# Patient Record
Sex: FEMALE | Race: WHITE | NOT HISPANIC OR LATINO | Employment: OTHER | URBAN - METROPOLITAN AREA
[De-identification: names, ages, dates, MRNs, and addresses within clinical notes are randomized per-mention and may not be internally consistent; named-entity substitution may affect disease eponyms.]

---

## 2017-01-09 ENCOUNTER — APPOINTMENT (OUTPATIENT)
Dept: PHYSICAL THERAPY | Facility: CLINIC | Age: 82
End: 2017-01-09
Payer: MEDICARE

## 2017-01-09 PROCEDURE — G8985 CARRY GOAL STATUS: HCPCS

## 2017-01-09 PROCEDURE — 97161 PT EVAL LOW COMPLEX 20 MIN: CPT

## 2017-01-09 PROCEDURE — 97110 THERAPEUTIC EXERCISES: CPT

## 2017-01-09 PROCEDURE — G8984 CARRY CURRENT STATUS: HCPCS

## 2017-01-11 ENCOUNTER — APPOINTMENT (OUTPATIENT)
Dept: PHYSICAL THERAPY | Facility: CLINIC | Age: 82
End: 2017-01-11
Payer: MEDICARE

## 2017-01-11 PROCEDURE — 97110 THERAPEUTIC EXERCISES: CPT

## 2017-01-18 ENCOUNTER — APPOINTMENT (OUTPATIENT)
Dept: PHYSICAL THERAPY | Facility: CLINIC | Age: 82
End: 2017-01-18
Payer: MEDICARE

## 2017-01-18 PROCEDURE — 97112 NEUROMUSCULAR REEDUCATION: CPT

## 2017-01-18 PROCEDURE — 97110 THERAPEUTIC EXERCISES: CPT

## 2017-01-20 ENCOUNTER — APPOINTMENT (OUTPATIENT)
Dept: PHYSICAL THERAPY | Facility: CLINIC | Age: 82
End: 2017-01-20
Payer: MEDICARE

## 2017-01-20 PROCEDURE — 97110 THERAPEUTIC EXERCISES: CPT

## 2017-01-23 ENCOUNTER — APPOINTMENT (OUTPATIENT)
Dept: PHYSICAL THERAPY | Facility: CLINIC | Age: 82
End: 2017-01-23
Payer: MEDICARE

## 2017-01-23 PROCEDURE — 97110 THERAPEUTIC EXERCISES: CPT

## 2017-01-25 ENCOUNTER — APPOINTMENT (OUTPATIENT)
Dept: PHYSICAL THERAPY | Facility: CLINIC | Age: 82
End: 2017-01-25
Payer: MEDICARE

## 2017-01-25 PROCEDURE — 97110 THERAPEUTIC EXERCISES: CPT

## 2017-01-30 ENCOUNTER — APPOINTMENT (OUTPATIENT)
Dept: PHYSICAL THERAPY | Facility: CLINIC | Age: 82
End: 2017-01-30
Payer: MEDICARE

## 2017-01-30 PROCEDURE — 97110 THERAPEUTIC EXERCISES: CPT

## 2017-02-01 ENCOUNTER — APPOINTMENT (OUTPATIENT)
Dept: PHYSICAL THERAPY | Facility: CLINIC | Age: 82
End: 2017-02-01
Payer: MEDICARE

## 2017-02-06 ENCOUNTER — APPOINTMENT (OUTPATIENT)
Dept: PHYSICAL THERAPY | Facility: CLINIC | Age: 82
End: 2017-02-06
Payer: MEDICARE

## 2017-02-06 PROCEDURE — 97110 THERAPEUTIC EXERCISES: CPT

## 2017-02-08 ENCOUNTER — APPOINTMENT (OUTPATIENT)
Dept: PHYSICAL THERAPY | Facility: CLINIC | Age: 82
End: 2017-02-08
Payer: MEDICARE

## 2017-02-08 PROCEDURE — 97110 THERAPEUTIC EXERCISES: CPT

## 2017-02-13 ENCOUNTER — APPOINTMENT (OUTPATIENT)
Dept: PHYSICAL THERAPY | Facility: CLINIC | Age: 82
End: 2017-02-13
Payer: MEDICARE

## 2017-02-13 PROCEDURE — G8986 CARRY D/C STATUS: HCPCS

## 2017-02-13 PROCEDURE — G8985 CARRY GOAL STATUS: HCPCS

## 2017-02-13 PROCEDURE — 97110 THERAPEUTIC EXERCISES: CPT

## 2017-02-21 DIAGNOSIS — I10 ESSENTIAL (PRIMARY) HYPERTENSION: ICD-10-CM

## 2017-02-22 ENCOUNTER — APPOINTMENT (OUTPATIENT)
Dept: LAB | Facility: CLINIC | Age: 82
End: 2017-02-22
Payer: MEDICARE

## 2017-02-22 ENCOUNTER — TRANSCRIBE ORDERS (OUTPATIENT)
Dept: LAB | Facility: CLINIC | Age: 82
End: 2017-02-22

## 2017-02-22 DIAGNOSIS — I10 ESSENTIAL (PRIMARY) HYPERTENSION: ICD-10-CM

## 2017-02-22 LAB
ALBUMIN SERPL BCP-MCNC: 3.5 G/DL (ref 3.5–5)
ALP SERPL-CCNC: 48 U/L (ref 46–116)
ALT SERPL W P-5'-P-CCNC: 28 U/L (ref 12–78)
ANION GAP SERPL CALCULATED.3IONS-SCNC: 8 MMOL/L (ref 4–13)
AST SERPL W P-5'-P-CCNC: 23 U/L (ref 5–45)
BILIRUB SERPL-MCNC: 0.36 MG/DL (ref 0.2–1)
BUN SERPL-MCNC: 23 MG/DL (ref 5–25)
CALCIUM SERPL-MCNC: 10 MG/DL (ref 8.3–10.1)
CHLORIDE SERPL-SCNC: 104 MMOL/L (ref 100–108)
CO2 SERPL-SCNC: 28 MMOL/L (ref 21–32)
CREAT SERPL-MCNC: 1.12 MG/DL (ref 0.6–1.3)
GFR SERPL CREATININE-BSD FRML MDRD: 46.2 ML/MIN/1.73SQ M
GLUCOSE SERPL-MCNC: 97 MG/DL (ref 65–140)
POTASSIUM SERPL-SCNC: 3.9 MMOL/L (ref 3.5–5.3)
PROT SERPL-MCNC: 7.2 G/DL (ref 6.4–8.2)
SODIUM SERPL-SCNC: 140 MMOL/L (ref 136–145)
TSH SERPL DL<=0.05 MIU/L-ACNC: 4.1 UIU/ML (ref 0.36–3.74)

## 2017-02-22 PROCEDURE — 80053 COMPREHEN METABOLIC PANEL: CPT

## 2017-02-22 PROCEDURE — 36415 COLL VENOUS BLD VENIPUNCTURE: CPT

## 2017-02-22 PROCEDURE — 84443 ASSAY THYROID STIM HORMONE: CPT

## 2017-02-23 ENCOUNTER — GENERIC CONVERSION - ENCOUNTER (OUTPATIENT)
Dept: OTHER | Facility: OTHER | Age: 82
End: 2017-02-23

## 2017-05-23 ENCOUNTER — ALLSCRIPTS OFFICE VISIT (OUTPATIENT)
Dept: OTHER | Facility: OTHER | Age: 82
End: 2017-05-23

## 2017-05-23 DIAGNOSIS — E78.2 MIXED HYPERLIPIDEMIA: ICD-10-CM

## 2017-05-23 DIAGNOSIS — I10 ESSENTIAL (PRIMARY) HYPERTENSION: ICD-10-CM

## 2017-05-23 DIAGNOSIS — N18.2 CHRONIC KIDNEY DISEASE, STAGE II (MILD): ICD-10-CM

## 2017-05-23 LAB
BILIRUB UR QL STRIP: NORMAL
CLARITY UR: NORMAL
COLOR UR: YELLOW
GLUCOSE (HISTORICAL): 50
HGB UR QL STRIP.AUTO: NORMAL
KETONES UR STRIP-MCNC: NORMAL MG/DL
LEUKOCYTE ESTERASE UR QL STRIP: NORMAL
NITRITE UR QL STRIP: NORMAL
PH UR STRIP.AUTO: 5 [PH]
PROT UR STRIP-MCNC: 30 MG/DL
SP GR UR STRIP.AUTO: 1.02
UROBILINOGEN UR QL STRIP.AUTO: NORMAL

## 2017-05-26 ENCOUNTER — APPOINTMENT (OUTPATIENT)
Dept: LAB | Facility: CLINIC | Age: 82
End: 2017-05-26
Payer: MEDICARE

## 2017-05-26 ENCOUNTER — TRANSCRIBE ORDERS (OUTPATIENT)
Dept: LAB | Facility: CLINIC | Age: 82
End: 2017-05-26

## 2017-05-26 DIAGNOSIS — I10 ESSENTIAL (PRIMARY) HYPERTENSION: ICD-10-CM

## 2017-05-26 DIAGNOSIS — N18.2 CHRONIC KIDNEY DISEASE, STAGE II (MILD): ICD-10-CM

## 2017-05-26 DIAGNOSIS — E78.2 MIXED HYPERLIPIDEMIA: ICD-10-CM

## 2017-05-26 LAB
ALBUMIN SERPL BCP-MCNC: 3.6 G/DL (ref 3.5–5)
ALP SERPL-CCNC: 56 U/L (ref 46–116)
ALT SERPL W P-5'-P-CCNC: 29 U/L (ref 12–78)
ANION GAP SERPL CALCULATED.3IONS-SCNC: 6 MMOL/L (ref 4–13)
AST SERPL W P-5'-P-CCNC: 24 U/L (ref 5–45)
BILIRUB SERPL-MCNC: 0.41 MG/DL (ref 0.2–1)
BUN SERPL-MCNC: 25 MG/DL (ref 5–25)
CALCIUM SERPL-MCNC: 9.7 MG/DL (ref 8.3–10.1)
CHLORIDE SERPL-SCNC: 103 MMOL/L (ref 100–108)
CHOLEST SERPL-MCNC: 228 MG/DL (ref 50–200)
CO2 SERPL-SCNC: 28 MMOL/L (ref 21–32)
CREAT SERPL-MCNC: 1.24 MG/DL (ref 0.6–1.3)
ERYTHROCYTE [DISTWIDTH] IN BLOOD BY AUTOMATED COUNT: 14.8 % (ref 11.6–15.1)
GFR SERPL CREATININE-BSD FRML MDRD: 41.1 ML/MIN/1.73SQ M
GLUCOSE P FAST SERPL-MCNC: 104 MG/DL (ref 65–99)
HCT VFR BLD AUTO: 37.6 % (ref 34.8–46.1)
HDLC SERPL-MCNC: 49 MG/DL (ref 40–60)
HGB BLD-MCNC: 12.1 G/DL (ref 11.5–15.4)
LDLC SERPL CALC-MCNC: 137 MG/DL (ref 0–100)
MCH RBC QN AUTO: 28.6 PG (ref 26.8–34.3)
MCHC RBC AUTO-ENTMCNC: 32.2 G/DL (ref 31.4–37.4)
MCV RBC AUTO: 89 FL (ref 82–98)
PLATELET # BLD AUTO: 462 THOUSANDS/UL (ref 149–390)
PMV BLD AUTO: 10.5 FL (ref 8.9–12.7)
POTASSIUM SERPL-SCNC: 4 MMOL/L (ref 3.5–5.3)
PROT SERPL-MCNC: 7.1 G/DL (ref 6.4–8.2)
RBC # BLD AUTO: 4.23 MILLION/UL (ref 3.81–5.12)
SODIUM SERPL-SCNC: 137 MMOL/L (ref 136–145)
T4 FREE SERPL-MCNC: 1.08 NG/DL (ref 0.76–1.46)
TRIGL SERPL-MCNC: 210 MG/DL
TSH SERPL DL<=0.05 MIU/L-ACNC: 6.33 UIU/ML (ref 0.36–3.74)
WBC # BLD AUTO: 7.45 THOUSAND/UL (ref 4.31–10.16)

## 2017-05-26 PROCEDURE — 84443 ASSAY THYROID STIM HORMONE: CPT

## 2017-05-26 PROCEDURE — 85027 COMPLETE CBC AUTOMATED: CPT

## 2017-05-26 PROCEDURE — 80053 COMPREHEN METABOLIC PANEL: CPT

## 2017-05-26 PROCEDURE — 36415 COLL VENOUS BLD VENIPUNCTURE: CPT

## 2017-05-26 PROCEDURE — 80061 LIPID PANEL: CPT

## 2017-05-26 PROCEDURE — 84439 ASSAY OF FREE THYROXINE: CPT

## 2017-05-29 ENCOUNTER — GENERIC CONVERSION - ENCOUNTER (OUTPATIENT)
Dept: OTHER | Facility: OTHER | Age: 82
End: 2017-05-29

## 2017-06-05 ENCOUNTER — ALLSCRIPTS OFFICE VISIT (OUTPATIENT)
Dept: OTHER | Facility: OTHER | Age: 82
End: 2017-06-05

## 2017-08-08 DIAGNOSIS — Z12.31 ENCOUNTER FOR SCREENING MAMMOGRAM FOR MALIGNANT NEOPLASM OF BREAST: ICD-10-CM

## 2017-09-26 ENCOUNTER — ALLSCRIPTS OFFICE VISIT (OUTPATIENT)
Dept: OTHER | Facility: OTHER | Age: 82
End: 2017-09-26

## 2017-09-26 DIAGNOSIS — Z12.31 ENCOUNTER FOR SCREENING MAMMOGRAM FOR MALIGNANT NEOPLASM OF BREAST: ICD-10-CM

## 2017-09-28 ENCOUNTER — ALLSCRIPTS OFFICE VISIT (OUTPATIENT)
Dept: OTHER | Facility: OTHER | Age: 82
End: 2017-09-28

## 2017-10-24 RX ORDER — VALSARTAN AND HYDROCHLOROTHIAZIDE 320; 25 MG/1; MG/1
1 TABLET, FILM COATED ORAL EVERY MORNING
COMMUNITY
End: 2018-09-14 | Stop reason: RX

## 2017-10-24 RX ORDER — METOPROLOL SUCCINATE 100 MG/1
100 TABLET, EXTENDED RELEASE ORAL
COMMUNITY
End: 2018-02-17 | Stop reason: SDUPTHER

## 2017-10-24 RX ORDER — AMLODIPINE BESYLATE 5 MG/1
5 TABLET ORAL EVERY EVENING
COMMUNITY
End: 2018-06-04 | Stop reason: SDUPTHER

## 2017-10-24 RX ORDER — FENOFIBRATE 145 MG/1
145 TABLET, COATED ORAL EVERY EVENING
COMMUNITY
End: 2018-05-11 | Stop reason: SDUPTHER

## 2017-10-24 RX ORDER — CYCLOSPORINE 0.5 MG/ML
1 EMULSION OPHTHALMIC 2 TIMES DAILY
COMMUNITY

## 2017-10-24 NOTE — PRE-PROCEDURE INSTRUCTIONS
Pre-Surgery Instructions:   Medication Instructions    amLODIPine (NORVASC) 5 mg tablet Patient was instructed by Physician and understands   cycloSPORINE (RESTASIS) 0 05 % ophthalmic emulsion Patient was instructed by Physician and understands   fenofibrate (TRICOR) 145 mg tablet Patient was instructed by Physician and understands   metoprolol succinate (TOPROL-XL) 100 mg 24 hr tablet Patient was instructed by Physician and understands   mometasone (ASMANEX 120 METERED DOSES) 220 MCG/INH inhaler Patient was instructed by Physician and understands   valsartan-hydrochlorothiazide (DIOVAN-HCT) 320-25 MG per tablet Patient was instructed by Physician and understands

## 2017-10-25 ENCOUNTER — HOSPITAL ENCOUNTER (OUTPATIENT)
Facility: AMBULARY SURGERY CENTER | Age: 82
Setting detail: OUTPATIENT SURGERY
Discharge: HOME | End: 2017-10-25
Attending: INTERNAL MEDICINE | Admitting: INTERNAL MEDICINE
Payer: MEDICARE

## 2017-10-25 ENCOUNTER — ANESTHESIA (OUTPATIENT)
Dept: GASTROENTEROLOGY | Facility: AMBULARY SURGERY CENTER | Age: 82
End: 2017-10-25
Payer: MEDICARE

## 2017-10-25 ENCOUNTER — GENERIC CONVERSION - ENCOUNTER (OUTPATIENT)
Dept: OTHER | Facility: OTHER | Age: 82
End: 2017-10-25

## 2017-10-25 VITALS
RESPIRATION RATE: 18 BRPM | TEMPERATURE: 97.8 F | OXYGEN SATURATION: 94 % | BODY MASS INDEX: 26.21 KG/M2 | HEIGHT: 59 IN | HEART RATE: 59 BPM | DIASTOLIC BLOOD PRESSURE: 92 MMHG | WEIGHT: 130 LBS | SYSTOLIC BLOOD PRESSURE: 147 MMHG

## 2017-10-25 DIAGNOSIS — K92.1 MELENA: ICD-10-CM

## 2017-10-25 PROCEDURE — 88305 TISSUE EXAM BY PATHOLOGIST: CPT | Performed by: INTERNAL MEDICINE

## 2017-10-25 RX ORDER — GLYCOPYRROLATE 0.2 MG/ML
INJECTION INTRAMUSCULAR; INTRAVENOUS AS NEEDED
Status: DISCONTINUED | OUTPATIENT
Start: 2017-10-25 | End: 2017-10-25 | Stop reason: SURG

## 2017-10-25 RX ORDER — SODIUM CHLORIDE, SODIUM LACTATE, POTASSIUM CHLORIDE, CALCIUM CHLORIDE 600; 310; 30; 20 MG/100ML; MG/100ML; MG/100ML; MG/100ML
INJECTION, SOLUTION INTRAVENOUS CONTINUOUS PRN
Status: DISCONTINUED | OUTPATIENT
Start: 2017-10-25 | End: 2017-10-25 | Stop reason: SURG

## 2017-10-25 RX ORDER — SODIUM CHLORIDE, SODIUM LACTATE, POTASSIUM CHLORIDE, CALCIUM CHLORIDE 600; 310; 30; 20 MG/100ML; MG/100ML; MG/100ML; MG/100ML
100 INJECTION, SOLUTION INTRAVENOUS CONTINUOUS
Status: CANCELLED | OUTPATIENT
Start: 2017-10-25

## 2017-10-25 RX ORDER — PROPOFOL 10 MG/ML
INJECTION, EMULSION INTRAVENOUS AS NEEDED
Status: DISCONTINUED | OUTPATIENT
Start: 2017-10-25 | End: 2017-10-25 | Stop reason: SURG

## 2017-10-25 RX ORDER — LIDOCAINE HYDROCHLORIDE 10 MG/ML
INJECTION, SOLUTION EPIDURAL; INFILTRATION; INTRACAUDAL; PERINEURAL AS NEEDED
Status: DISCONTINUED | OUTPATIENT
Start: 2017-10-25 | End: 2017-10-25 | Stop reason: SURG

## 2017-10-25 RX ADMIN — PROPOFOL 50 MG: 10 INJECTION, EMULSION INTRAVENOUS at 11:06

## 2017-10-25 RX ADMIN — PROPOFOL 10 MG: 10 INJECTION, EMULSION INTRAVENOUS at 11:26

## 2017-10-25 RX ADMIN — PROPOFOL 20 MG: 10 INJECTION, EMULSION INTRAVENOUS at 11:16

## 2017-10-25 RX ADMIN — PROPOFOL 10 MG: 10 INJECTION, EMULSION INTRAVENOUS at 11:14

## 2017-10-25 RX ADMIN — PROPOFOL 10 MG: 10 INJECTION, EMULSION INTRAVENOUS at 11:30

## 2017-10-25 RX ADMIN — PROPOFOL 50 MG: 10 INJECTION, EMULSION INTRAVENOUS at 11:05

## 2017-10-25 RX ADMIN — PROPOFOL 10 MG: 10 INJECTION, EMULSION INTRAVENOUS at 11:24

## 2017-10-25 RX ADMIN — PROPOFOL 10 MG: 10 INJECTION, EMULSION INTRAVENOUS at 11:18

## 2017-10-25 RX ADMIN — GLYCOPYRROLATE 0.2 MG: 0.2 INJECTION, SOLUTION INTRAMUSCULAR; INTRAVENOUS at 11:20

## 2017-10-25 RX ADMIN — GLYCOPYRROLATE 0.2 MG: 0.2 INJECTION, SOLUTION INTRAMUSCULAR; INTRAVENOUS at 11:14

## 2017-10-25 RX ADMIN — PROPOFOL 10 MG: 10 INJECTION, EMULSION INTRAVENOUS at 11:10

## 2017-10-25 RX ADMIN — PROPOFOL 10 MG: 10 INJECTION, EMULSION INTRAVENOUS at 11:28

## 2017-10-25 RX ADMIN — SODIUM CHLORIDE, SODIUM LACTATE, POTASSIUM CHLORIDE, AND CALCIUM CHLORIDE: .6; .31; .03; .02 INJECTION, SOLUTION INTRAVENOUS at 07:20

## 2017-10-25 RX ADMIN — SODIUM CHLORIDE, SODIUM LACTATE, POTASSIUM CHLORIDE, AND CALCIUM CHLORIDE 1000 ML: .6; .31; .03; .02 INJECTION, SOLUTION INTRAVENOUS at 10:50

## 2017-10-25 RX ADMIN — PROPOFOL 10 MG: 10 INJECTION, EMULSION INTRAVENOUS at 11:22

## 2017-10-25 RX ADMIN — PROPOFOL 10 MG: 10 INJECTION, EMULSION INTRAVENOUS at 11:07

## 2017-10-25 RX ADMIN — LIDOCAINE HYDROCHLORIDE 50 MG: 10 INJECTION, SOLUTION EPIDURAL; INFILTRATION; INTRACAUDAL; PERINEURAL at 11:08

## 2017-10-25 RX ADMIN — PROPOFOL 10 MG: 10 INJECTION, EMULSION INTRAVENOUS at 11:20

## 2017-10-25 NOTE — ANESTHESIA PREPROCEDURE EVALUATION
Review of Systems/Medical History  Patient summary reviewed  Chart reviewed      Cardiovascular  Hyperlipidemia, Hypertension ,    Pulmonary  Asthma: , ,        GI/Hepatic            Endo/Other     GYN       Hematology   Musculoskeletal       Neurology   Psychology           Physical Exam    Airway    Mallampati score: II  TM Distance: >3 FB  Neck ROM: full     Dental       Cardiovascular  Rhythm: regular, Rate: normal,     Pulmonary      Other Findings        Anesthesia Plan  ASA Score- 2       Anesthesia Type- IV sedation with anesthesia with ASA Monitors  Additional Monitors:   Airway Plan:           Induction- intravenous  Informed Consent- Anesthetic plan and risks discussed with patient

## 2017-10-25 NOTE — H&P
History and Physical -  [facility]  Donavan Parker 80 y o  female MRN: [id number]    HPI: Donavan Parker is a 80y o  year old female who presents with change in bowel movements and incomplete evacuation  Review of Systems    Historical Information   Past Medical History:   Diagnosis Date    Anesthesia     groggy, difficult to awaken    Asthma     Chronic kidney disease     stage 2 mild    Chronic pain disorder     lumbar herniated discs    Dry eyes     Hyperlipidemia     Hypertension      Past Surgical History:   Procedure Laterality Date    APPENDECTOMY      CATARACT EXTRACTION Bilateral     JOINT REPLACEMENT Bilateral     partial knee     Social History   History   Alcohol Use No     History   Drug Use No     History   Smoking Status    Never Smoker   Smokeless Tobacco    Never Used     Family History   Problem Relation Age of Onset    Heart disease Father     Aneurysm Sister     Heart disease Brother     Cancer Daughter      kidney     Heart disease Brother     Heart disease Brother     Heart disease Sister      MI    Kidney disease Sister     Heart disease Daughter        Meds/Allergies     Prescriptions Prior to Admission   Medication    amLODIPine (NORVASC) 5 mg tablet    cycloSPORINE (RESTASIS) 0 05 % ophthalmic emulsion    fenofibrate (TRICOR) 145 mg tablet    metoprolol succinate (TOPROL-XL) 100 mg 24 hr tablet    mometasone (ASMANEX 120 METERED DOSES) 220 MCG/INH inhaler    valsartan-hydrochlorothiazide (DIOVAN-HCT) 320-25 MG per tablet       Allergies   Allergen Reactions    Penicillins Rash       Objective     Height 4' 11" (1 499 m), weight 59 kg (130 lb)  PHYSICAL EXAM    Gen: NAD  CV: RRR  CHEST: Clear  ABD: soft, NT/ND  EXT: no edema  Neuro: AAO      ASSESSMENT/PLAN:  This is a 80y o  year old female here for change in bowel movements and incomplete evacuation       PLAN:   Procedure: colonoscopy

## 2017-10-25 NOTE — OP NOTE
COLONOSCOPY    PROCEDURE: Colonoscopy/ Biopsy    INDICATIONS: Rectal Pain, change in bowel movements, thin caliber stools     POST-OP DIAGNOSIS: See the impression below    SEDATION: Monitored anesthesia care, check anesthesia records    PHYSICAL EXAM:    BP (!) 172/76   Pulse 56   Temp 97 8 °F (36 6 °C) (Tympanic)   Resp 18   Ht 4' 11" (1 499 m)   Wt 59 kg (130 lb)   SpO2 97%   BMI 26 26 kg/m²   Body mass index is 26 26 kg/m²  General: NAD  Heart: S1 & S2 normal, RRR  Lungs: CTA, No rales or rhonchi  Abdomen: Soft, nontender, nondistended, good bowel sounds    CONSENT:  Informed consent was obtained for the procedure, including sedation after explaining the risks and benefits of the procedure  Risks including but not limited to bleeding, perforation, infection, aspiration were discussed in detail  Also explained about less than 100%$ sensitivity with the exam and other alternatives  PREPARATION:   EKG tracing, pulse oximetry, blood pressure were monitored throughout the procedure  Patient was identified by myself both verbally and by visual inspection of ID band  DESCRIPTION:   Patient was placed in the left lateral decubitus position and was sedated with the above medication  Digital rectal examination was performed  The colonoscope was introduced in to the anal canal and advanced up to cecum, which was identified by the appendiceal orifice and IC valve  A careful inspection was made as the colonoscope was withdrawn, including a retroflexed view of the rectum; findings and interventions are described below  Appropriate photodocumentation was obtained  The quality of the colonic preparation was good  FINDINGS:    Mild sigmoid diverticulosis, very tortuous colon   Patient developed bradycardia, scope was withdraw and given glycopyrlate  Otherwise normal colon, random biopsies taken  Mild internal hemorrhoids         IMPRESSIONS:      Sigmoid diverticulosis and mild internal hemorrhoids  Random biopsies taken  Bradycardia during colonoscopy, required pharmacologic correction    RECOMMENDATIONS:    Discharge home  Resume regular diet  Resume home meds  Follow up biopsy results, call the office in 3 months  No indication for repeat colonoscopy  Take suppositories as prescribed  Call with any abdominal pain, bleeding, fevers    COMPLICATIONS:  None; patient tolerated the procedure well      DISPOSITION: PACU           CONDITION: Stable

## 2017-10-25 NOTE — DISCHARGE INSTRUCTIONS
Discharge home  Resume regular diet  Resume home meds  Follow up biopsy results, call the office in 3 months  No indication for repeat colonoscopy  Take suppositories as prescribed  Call with any abdominal pain, bleeding, fevers

## 2017-10-27 NOTE — CONSULTS
Assessment  1  Change in bowel habits (787 99) (R19 4)   2  Blood in the stool (578 1) (K92 1)    Plan  Blood in the stool    · Anusol-HC 25 MG Rectal Suppository; INSERT 1 SUPPOSITORY RECTALLY AT  BEDTIME AS NEEDED   Rx By: Susy Merlin; Dispense: 15 Days ; #:15 Suppository; Refill: 3;For: Blood in the stool; CHUCK = N; Verified Transmission to [facility]; Last Updated By: System, SureScript[employer]; 9/28/2017 9:42:45 AM   · Suprep Bowel Prep Kit 17 5-3 13-1 6 GM/180ML Oral Solution; USE AS DIRECTED   Rx By: Susy Merlin; Dispense: 0 Days ; #:1 X 177 ML Bottle (2 Bottles); Refill: 0;For: Blood in the stool; CHUCK = N; Verified Transmission to [facility]; Last Updated By: System, SureScript[employer]; 9/28/2017 9:42:44 AM   · COLONOSCOPY (GI, SURG); Status:Hold For - Scheduling; Requested :[id number];    Perform:[facility]; ENX:[id number]; Ordered; For:Blood in the stool; Ordered By:Austin Finley;    Discussion/Summary  Discussion Summary:   Very pleasant 15-year-old female with a history of herniated back disks, now with thin caliber stools and incomplete evacuation with rare amounts of blood in stool  change in bowel movements, blood in stool, incomplete evacuation: This could be due to colitis, hemorrhoids, more significant pathology such as malignancies on the differentialrecommended that she take a daily fiber supplementgive her suppository prescription to use for 3 nights to see if this helps somewill proceed with colonoscopy to exclude luminal pathology as discussed above, discussed with her the risks of the procedures including bleeding, surgery, perforation, missed polyp detection rate        Chief Complaint  Chief Complaint Free Text Note Form: Evaluation for change in bowel movements      History of Present Illness  HPI: As you know this is a very pleasant 15-year-old female with a history of hypertension, hyperlipidemia, well-controlled asthma, she reports a vague history of colitis in the past, last colonoscopy was within 20 years ago she notes that over the last one year she's had a change in bowel movements  She had some herniated discs and she notes when that first started, L4-L5 started to have change in bowel movements  Typically she reports rectal pressure, sensation of incomplete evacuation and what she describes as flat, thin stools  This had subsided for a while but has recurred again recently  She only takes Aleve for her back pain  She's also undergoing physical therapy  She notes that if she has a loose stool after several loose bowel movements she noticed streaks of blood with that  She has a loose stools about once per month  This is typically diet  denies any nausea, vomiting, abdominal pain, melena, reflux symptoms or dysphagia  She has gained some weight recently  Family history is negative for GI associated malignancies  Her daughter did have kidney cancer  patient is a retired  and she judges dogs shows  Review of Systems  Complete-Female GI Adult: Other Symptoms: The remainder of the ten ROS was negative  Active Problems  1  Asthma (493 90) (J45 909)   2  Benign essential hypertension (401 1) (I10)   3  Change in bowel habits (787 99) (R19 4)   4  Chronic kidney disease, stage II (mild) (585 2) (N18 2)   5  Encounter for screening mammogram for malignant neoplasm of breast (V76 12)   (Z12 31)   6  Lumbar radiculopathy (724 4) (M54 16)   7  Mixed hyperlipidemia (272 2) (E78 2)   8  Need for vaccination with 13-polyvalent pneumococcal conjugate vaccine (V03 82) (Z23)   9  Osteoporosis (733 00) (M81 0)   10  Rectal pain (569 42) (K62 89)    Past Medical History  1  History of Acute pansinusitis (461 8) (J01 40)   2  History of Acute right-sided back pain, unspecified back location (724 5) (M54 9)   3  History of Acute upper respiratory infection (465 9) (J06 9)   4  History of Change in bowel habits (787 99) (R19 4)   5  History of Cough (786 2) (R05)   6   History of backache (V13 59) (Z87 39)   7  History of chronic pharyngitis (V12 69) (Z87 09)   8  History of contact dermatitis (V13 3) (Z87 2)   9  History of herpes zoster (V12 09) (Z86 19)   10  History of hypercholesterolemia (V12 29) (Z86 39)   11  History of Impacted cerumen, unspecified laterality (380 4) (H61 20)   12  History of Left sided sciatica (724 3) (M54 32)   13  History of Limb pain (729 5) (M79 609)   14  History of Limb pain (729 5) (M79 609)   15  History of Lumbar radiculopathy (724 4) (M54 16)   16  History of Multiple joint pain (719 49) (M25 50)   17  History of Multiple Nonvenomous Insect Bites (919 4)   18  History of Nonallopathic lesion of lumbar region (739 3) (M99 9)   19  History of Osteoarthritis of knee (715 36) (M17 10)   20  History of Pain in wrist, unspecified laterality (719 43) (M25 539)   21  History of Rash and other nonspecific skin eruption (782 1) (R21)   22  History of Screening for malignant neoplasm of cervix (V76 2) (Z12 4)   23  History of Symptoms Referable To A Joint Of The Shoulder Region (719 61)   24  History of Vulvovaginitis (616 10) (N76 0)  Active Problems And Past Medical History Reviewed: The active problems and past medical history were reviewed and updated today  Surgical History  1  History of Knee Arthroplasty  Surgical History Reviewed: The surgical history was reviewed and updated today  Family History  Mother    1  Denied: Family history of colon cancer   2  Denied: Family history of colonic polyps   3  Denied: Family history of liver disease  Father    4  Denied: Family history of colon cancer   5  Denied: Family history of colonic polyps   6  Denied: Family history of liver disease  Family History Reviewed: The family history was reviewed and updated today  Social History   · Never A Smoker  Social History Reviewed: The social history was reviewed and updated today  Current Meds   1   AmLODIPine Besylate 5 MG Oral Tablet; TAKE ONE TO TWO TABLETS BY MOUTH ONCE   DAILY; Therapy: 70OXN5909 to (Camelia Holliday)  Requested for: 26NGA2331; Last   Rx:74Ewd2000 Ordered   2  Asmanex 120 Metered Doses 220 MCG/INH Inhalation Aerosol Powder Breath Activated;   INHALE TWO PUFFS BY MOUTH ONCE DAILY; Therapy: 88OTY5817 to ([id number])  Requested for: 43RZI5620; Last   Rx:62Owt8332 Ordered   3  Fenofibrate 145 MG Oral Tablet; TAKE ONE TABLET BY MOUTH ONCE DAILY WITH   FOOD; Therapy: 78YZB0322 to (Evaluate:41Bij0207)  Requested for: 22Ngg8765; Last   Rx:43Wdg3742 Ordered   4  Metoprolol Succinate  MG Oral Tablet Extended Release 24 Hour; TAKE ONE   TABLET BY MOUTH AT BEDTIME; Therapy: 73KCM1512 to (Evaluate:13Lbh0216)  Requested for: 54OHQ7504; Last   Rx:64Evc8584 Ordered   5  ProAir  (90 Base) MCG/ACT Inhalation Aerosol Solution; 2 puffs INH every 4 h as   needed wheezing; Therapy: 97FKR7926 to (Last BU:36TYM8118)  Requested for: 09CVH7818 Ordered   6  Restasis 0 05 % Ophthalmic Emulsion; Therapy: 94GMZ3252 to  Requested for: 96FVW8996 Recorded   7  Valsartan-Hydrochlorothiazide 320-25 MG Oral Tablet; TAKE ONE TABLET BY MOUTH   ONCE DAILY; Therapy: 10GPL5733 to (Evaluate:15Jnh5962)  Requested for: 81FSY7729; Last   Rx:71Meg9334 Ordered  Medication List Reviewed: The medication list was reviewed and updated today  Allergies  1  Avelox TABS   2  Codeine   3  Lipitor TABS   4  Penicillins   5  Zithromax TABS    Vitals  Vital Signs    Recorded: 20KDF9118 09:27AM   Temperature 97 6 F   Heart Rate 63   Respiration 16   Systolic 010   Diastolic 90   Height 4 ft 11 in   Weight 133 lb 4 oz   BMI Calculated 26 91   BSA Calculated 1 55   O2 Saturation 94     Physical Exam  Gen  : Well-nourished well-developed, no acute distress  HEENT: Anicteric, moist mucous membranes, no cervical or supraclavicular lymphadenopathy  Cardiovascular: Regular rate and rhythm, no murmurs rubs or gallops  Pulmonary: Clear to auscultation bilaterally  Abdomen: Soft, nontender, nondistended  No hepatosplenomegaly  Bowel sounds present and regular    Extremities: No cyanosis, clubbing, or edema,  Skin: No rashes  Neuro: Alert, awake, oriented x3  Rectal exam was deferred today         Signatures   Electronically signed by : GEOVANI Villanueva ; Sep 28 2017  9:44AM EST                       (Author)

## 2017-10-29 ENCOUNTER — GENERIC CONVERSION - ENCOUNTER (OUTPATIENT)
Dept: OTHER | Facility: OTHER | Age: 82
End: 2017-10-29

## 2017-11-09 DIAGNOSIS — Z12.31 ENCOUNTER FOR SCREENING MAMMOGRAM FOR MALIGNANT NEOPLASM OF BREAST: ICD-10-CM

## 2017-11-15 ENCOUNTER — HOSPITAL ENCOUNTER (OUTPATIENT)
Dept: RADIOLOGY | Facility: CLINIC | Age: 82
Discharge: HOME | End: 2017-11-15
Payer: MEDICARE

## 2017-11-15 DIAGNOSIS — Z12.31 ENCOUNTER FOR SCREENING MAMMOGRAM FOR MALIGNANT NEOPLASM OF BREAST: ICD-10-CM

## 2017-11-15 PROCEDURE — G0202 SCR MAMMO BI INCL CAD: HCPCS

## 2018-01-09 NOTE — RESULT NOTES
Verified Results  (1) COMPREHENSIVE METABOLIC PANEL 45BHD9942 21:57QX Brenda Bansal Order Number: [id number]     Test Name Result Flag Reference   SODIUM 137 mmol/L  136-145   POTASSIUM 4 0 mmol/L  3 5-5 3   CHLORIDE 103 mmol/L  100-108   CARBON DIOXIDE 28 mmol/L  21-32   ANION GAP (CALC) 6 mmol/L  4-13   BLOOD UREA NITROGEN 25 mg/dL  5-25   CREATININE 1 24 mg/dL  0 60-1 30   Standardized to IDMS reference method   CALCIUM 9 7 mg/dL  8 3-10 1   BILI, TOTAL 0 41 mg/dL  0 20-1 00   ALK PHOSPHATAS 56 U/L     ALT (SGPT) 29 U/L  12-78   AST(SGOT) 24 U/L  5-45   ALBUMIN 3 6 g/dL  3 5-5 0   TOTAL PROTEIN 7 1 g/dL  6 4-8 2   eGFR Non-African American 41 1 ml/min/1 73sq m     National Kidney Disease Education Program recommendations are as follows:  GFR calculation is accurate only with a steady state creatinine  Chronic Kidney disease less than 60 ml/min/1 73 sq  meters  Kidney failure less than 15 ml/min/1 73 sq  meters  GLUCOSE FASTING 104 mg/dL H 65-99     (1) LIPID PANEL, FASTING 47IYZ8297 09:05AM Brenda Bansal Order Number: [id number]     Test Name Result Flag Reference   CHOLESTEROL 228 mg/dL H    HDL,DIRECT 49 mg/dL  40-60   Specimen collection should occur prior to Metamizole administration due to the potential for falsely depressed results  LDL CHOLESTEROL CALCULATED 137 mg/dL H 0-100   Triglyceride:         Normal              <150 mg/dl       Borderline High    150-199 mg/dl       High               200-499 mg/dl       Very High          >499 mg/dl  Cholesterol:         Desirable        <200 mg/dl      Borderline High  200-239 mg/dl      High             >239 mg/dl  HDL Cholesterol:        High    >59 mg/dL      Low     <41 mg/dL  LDL CALCULATED:    This screening LDL is a calculated result  It does not have the accuracy of the Direct Measured LDL in the monitoring of patients with hyperlipidemia and/or statin therapy     Direct Measure LDL (FTX525) must be ordered separately in these patients  TRIGLYCERIDES 210 mg/dL H <=150   Specimen collection should occur prior to N-Acetylcysteine or Metamizole administration due to the potential for falsely depressed results  (1) CBC/ PLT (NO DIFF) 90TJB6691 09:05AM Jamaica Castillo Order Number: [id number]     Test Name Result Flag Reference   HEMATOCRIT 37 6 %  34 8-46 1   HEMOGLOBIN 12 1 g/dL  11 5-15 4   MCHC 32 2 g/dL  31 4-37 4   MCH 28 6 pg  26 8-34 3   MCV 89 fL  82-98   PLATELET COUNT 452 Thousands/uL H 149-390   RBC COUNT 4 23 Million/uL  3 81-5 12   RDW 14 8 %  11 6-15 1   WBC COUNT 7 45 Thousand/uL  4 31-10 16   MPV 10 5 fL  8 9-12 7     (1) TSH WITH FT4 REFLEX 96UQX9177 09:05AM Jamaica Castillo Order Number: [id number]     Test Name Result Flag Reference   TSH 6 330 uIU/mL H 0 358-3 740   Patients undergoing fluorescein dye angiography may retain small amounts of fluorescein in the body for 48-72 hours post procedure  Samples containing fluorescein can produce falsely depressed TSH values  If the patient had this procedure,a specimen should be resubmitted post fluorescein clearance            The recommended reference ranges for TSH during pregnancy are as follows:  First trimester 0 1 to 2 5 uIU/mL  Second trimester  0 2 to 3 0 uIU/mL  Third trimester 0 3 to 3 0 uIU/m   T4,FREE 1 08 ng/dL  0 76-1 46       Signatures   Electronically signed by : BEATRIS Daniels; May 29 2017 10:36AM EST                       (Author)

## 2018-01-10 NOTE — RESULT NOTES
Discussion/Summary   Please inform the patient that biopsies from her colonoscopy were normal, with no polypoid tissue, no precancerous changes and no colitis  Please make sure that she is taking the rectal suppository prescribed and follow up in the office in 3 months  Please have her follow up sooner if needed  Please have her call with any questions or concerns  Verified Results  (1) TISSUE EXAM 94LAZ7600 11:27AM Serafin Hodge     Test Name Result Flag Reference   LAB AP CASE REPORT (Report)     Surgical Pathology Report             Case: [id number]                   Authorizing Provider: Daljit Avalos MD      Collected:      10/25/2017 1127        Ordering Location:   [facility]  Received:      10/25/2017 033MetaJure                                     Pathologist:      Gabriel Wei MD                                 Specimen:  Large Intestine, Left/Descending Colon, left colon random biopsies   LAB AP FINAL DIAGNOSIS (Report)     A  Left colon (random biopsy):    - Colonic mucosa with no significant pathologic abnormalities  - No active inflammation or histologic evidence of a microscopic   (lymphocytic)     or collagenous colitis noted  - No granulomas, dysplasia or neoplasia identified  Electronically signed by Gabriel Wei MD on 10/27/2017 at 11:11 AM   LAB AP NOTE      Interpretation performed at [facility], [address] [place] [address], [address] [facility]  LAB AP SURGICAL ADDITIONAL INFORMATION (Report)     All controls performed with the immunohistochemical stains reported above   reacted appropriately  These tests were developed and their performance   characteristics determined by [facility] or   [employer]  They may not be cleared or approved by the [place]  Food and Drug Administration  The FDA has determined that such clearance   or approval is not necessary  These tests are used for clinical purposes     They should not be regarded as investigational or for research  This   laboratory has been approved by CLIA 88, designated as a high-complexity   laboratory and is qualified to perform these tests  LAB AP GROSS DESCRIPTION (Report)     A  The specimen is received in formalin, labeled with the patient's name   and hospital number, and is designated Left: random biopsies  The   specimen consists of 4 tan-pink soft tissue fragments measuring 0 4 cm,   0 5 cm, 0 6 cm, 0 8 cm in greatest dimension  Entirely submitted  One   cassette  Note: The estimated total formalin fixation time based upon information   provided by the submitting clinician and the standard processing schedule   is under 72 hours   [facility]   LAB AP CLINICAL INFORMATION [facility]

## 2018-01-12 VITALS
TEMPERATURE: 98.2 F | SYSTOLIC BLOOD PRESSURE: 150 MMHG | HEIGHT: 59 IN | WEIGHT: 133 LBS | BODY MASS INDEX: 26.81 KG/M2 | RESPIRATION RATE: 16 BRPM | HEART RATE: 72 BPM | DIASTOLIC BLOOD PRESSURE: 70 MMHG

## 2018-01-12 VITALS
HEIGHT: 59 IN | SYSTOLIC BLOOD PRESSURE: 150 MMHG | WEIGHT: 134.38 LBS | RESPIRATION RATE: 16 BRPM | HEART RATE: 72 BPM | DIASTOLIC BLOOD PRESSURE: 64 MMHG | BODY MASS INDEX: 27.09 KG/M2 | TEMPERATURE: 99.1 F

## 2018-01-12 VITALS
RESPIRATION RATE: 14 BRPM | DIASTOLIC BLOOD PRESSURE: 62 MMHG | HEIGHT: 59 IN | TEMPERATURE: 98.7 F | HEART RATE: 78 BPM | WEIGHT: 135 LBS | BODY MASS INDEX: 27.21 KG/M2 | SYSTOLIC BLOOD PRESSURE: 130 MMHG

## 2018-01-12 NOTE — RESULT NOTES
Verified Results  * MAMMO SCREENING BILATERAL W CAD 28Jun2016 02:38PM Shameka Griffin Order Number: [id number]     Test Name Result Flag Reference   MAMMO SCREENING BILATERAL W CAD (Report)     Patient History:   Patient is postmenopausal    Patient's BMI is 25 0  Reason for exam: screening (asymptomatic)  Mammo Screening Bilateral W CAD: June 28, 2016 - Check In #:    [de-identified]   Bilateral CC and MLO view(s) were taken  Technologist: ANDRESSA Vanegas Aas   Prior study comparison: April 14, 2015, bilateral screening    mammogram performed at [place] [address]  March 24, 2014, bilateral screening mammogram performed at [place] [address]  July 6, 2012, bilateral    screening mammogram performed at [place] [address]  There are scattered fibroglandular densities  No new dominant    soft tissue mass, architectural distortion or suspicious    calcifications are noted  The skin and nipple structures are    within normal limits  No mammographic evidence of malignancy  No    significant changes when compared with prior studies  ASSESSMENT: BiRad:2 - Benign     Recommendation:   Routine screening mammogram of both breasts in 1 year  Analyzed by CAD     8-10% of cancers will be missed on mammography  Management of a    palpable abnormality must be based on clinical grounds  Patients   will be notified of their results via letter from our facility  Accredited by [employer] of Radiology and FDA       Transcription Location: [facility] 98: [id number]     Risk Value(s):   Tyrer-Cuzick 10 Year: 0 225%, Tyrer-Cuzick Lifetime: 0 225%,    Myriad Table: 1 5%, SAMEERA 5 Year: 1 1%, NCI Lifetime: 1 3%   Signed by:   Cheri Mir MD   6/28/16       Discussion/Summary   normal mammogram - Dr RIVERA

## 2018-01-13 VITALS
DIASTOLIC BLOOD PRESSURE: 90 MMHG | WEIGHT: 133.25 LBS | OXYGEN SATURATION: 94 % | SYSTOLIC BLOOD PRESSURE: 188 MMHG | BODY MASS INDEX: 26.86 KG/M2 | RESPIRATION RATE: 16 BRPM | HEART RATE: 63 BPM | HEIGHT: 59 IN | TEMPERATURE: 97.6 F

## 2018-01-16 NOTE — RESULT NOTES
Verified Results  (1) BASIC METABOLIC PROFILE [id number] 11:30AM Santy Gutierrez   TW Order Number: [id number]    TW Order Number: [id number]  National Kidney Disease Education Program recommendations are as follows:  GFR calculation is accurate only with a steady state creatinine  Chronic Kidney disease less than 60 ml/min/1 73 sq  meters  Kidney failure less than 15 ml/min/1 73 sq  meters  Test Name Result Flag Reference   GLUCOSE,RANDM 89 mg/dL     If the patient is fasting, the ADA then defines impaired fasting glucose as > 100 mg/dL and diabetes as > or equal to 123 mg/dL     SODIUM 138 mmol/L  136-145   POTASSIUM 4 6 mmol/L  3 5-5 3   CHLORIDE 103 mmol/L  100-108   CARBON DIOXIDE 29 mmol/L  21-32   ANION GAP (CALC) 6 mmol/L  4-13   BLOOD UREA NITROGEN 23 mg/dL  5-25   CREATININE 1 22 mg/dL  0 60-1 30   Standardized to IDMS reference method   CALCIUM 9 2 mg/dL  8 3-10 1   eGFR Non-African American 42 0 ml/min/1 73sq m

## 2018-01-18 NOTE — RESULT NOTES
Verified Results  (1) TSH [id number] 11:55AM Bianca Eva   TW Order Number: [id number]     Test Name Result Flag Reference   TSH 4 100 uIU/mL H 0 358-3 740   - Patient Instructions: This bloodwork is non-fasting  Please drink two glasses of water morning of bloodwork  - Patient Instructions: This bloodwork is non-fasting  Please drink two glasses of water morning of bloodwork  Patients undergoing fluorescein dye angiography may retain small amounts of fluorescein in the body for 48-72 hours post procedure  Samples containing fluorescein can produce falsely depressed TSH values  If the patient had this procedure,a specimen should be resubmitted post fluorescein clearance  The recommended reference ranges for TSH during pregnancy are as follows:  First trimester 0 1 to 2 5 uIU/mL  Second trimester  0 2 to 3 0 uIU/mL  Third trimester 0 3 to 3 0 uIU/m     (1) COMPREHENSIVE METABOLIC PANEL [id number] 89:73XS Bianca Eva   TW Order Number: [id number]     Test Name Result Flag Reference   GLUCOSE,RANDM 97 mg/dL     If the patient is fasting, the ADA then defines impaired fasting glucose as > 100 mg/dL and diabetes as > or equal to 123 mg/dL  SODIUM 140 mmol/L  136-145   POTASSIUM 3 9 mmol/L  3 5-5 3   CHLORIDE 104 mmol/L  100-108   CARBON DIOXIDE 28 mmol/L  21-32   ANION GAP (CALC) 8 mmol/L  4-13   BLOOD UREA NITROGEN 23 mg/dL  5-25   CREATININE 1 12 mg/dL  0 60-1 30   Standardized to IDMS reference method   CALCIUM 10 0 mg/dL  8 3-10 1   BILI, TOTAL 0 36 mg/dL  0 20-1 00   ALK PHOSPHATAS 48 U/L     ALT (SGPT) 28 U/L  12-78   AST(SGOT) 23 U/L  5-45   ALBUMIN 3 5 g/dL  3 5-5 0   TOTAL PROTEIN 7 2 g/dL  6 4-8 2   eGFR Non-African American 46 2 ml/min/1 73sq m     - Patient Instructions: This bloodwork is non-fasting  Please drink two glasses of water morning of bloodwork    National Kidney Disease Education Program recommendations are as follows:  GFR calculation is accurate only with a steady state creatinine  Chronic Kidney disease less than 60 ml/min/1 73 sq  meters  Kidney failure less than 15 ml/min/1 73 sq  meters

## 2018-02-07 DIAGNOSIS — J45.20 MILD INTERMITTENT ASTHMA WITHOUT COMPLICATION: Primary | ICD-10-CM

## 2018-02-07 RX ORDER — MOMETASONE FUROATE 220 UG/1
INHALANT RESPIRATORY (INHALATION)
Qty: 1 INHALER | Refills: 1 | Status: SHIPPED | OUTPATIENT
Start: 2018-02-07 | End: 2018-07-15 | Stop reason: SDUPTHER

## 2018-02-17 DIAGNOSIS — I10 ESSENTIAL HYPERTENSION: Primary | ICD-10-CM

## 2018-02-18 RX ORDER — METOPROLOL SUCCINATE 100 MG/1
TABLET, EXTENDED RELEASE ORAL
Qty: 90 TABLET | Refills: 3 | Status: SHIPPED | OUTPATIENT
Start: 2018-02-18 | End: 2019-02-13 | Stop reason: SDUPTHER

## 2018-05-11 DIAGNOSIS — E78.5 HYPERLIPIDEMIA, UNSPECIFIED HYPERLIPIDEMIA TYPE: Primary | ICD-10-CM

## 2018-05-14 RX ORDER — FENOFIBRATE 145 MG/1
TABLET, COATED ORAL
Qty: 30 TABLET | Refills: 11 | Status: SHIPPED | OUTPATIENT
Start: 2018-05-14 | End: 2019-05-19 | Stop reason: SDUPTHER

## 2018-06-04 DIAGNOSIS — I10 ESSENTIAL HYPERTENSION: Primary | ICD-10-CM

## 2018-06-04 RX ORDER — AMLODIPINE BESYLATE 5 MG/1
TABLET ORAL
Qty: 60 TABLET | Refills: 6 | Status: SHIPPED | OUTPATIENT
Start: 2018-06-04 | End: 2019-07-14 | Stop reason: SDUPTHER

## 2018-07-15 DIAGNOSIS — J45.20 MILD INTERMITTENT ASTHMA WITHOUT COMPLICATION: ICD-10-CM

## 2018-07-15 RX ORDER — MOMETASONE FUROATE 220 UG/1
INHALANT RESPIRATORY (INHALATION)
Qty: 1 INHALER | Refills: 1 | Status: SHIPPED | OUTPATIENT
Start: 2018-07-15 | End: 2018-11-14 | Stop reason: SDUPTHER

## 2018-09-14 ENCOUNTER — TELEPHONE (OUTPATIENT)
Dept: FAMILY MEDICINE CLINIC | Facility: CLINIC | Age: 83
End: 2018-09-14

## 2018-09-14 DIAGNOSIS — I10 ESSENTIAL HYPERTENSION: Primary | ICD-10-CM

## 2018-09-14 RX ORDER — IRBESARTAN AND HYDROCHLOROTHIAZIDE 300; 12.5 MG/1; MG/1
1 TABLET, FILM COATED ORAL DAILY
Qty: 30 TABLET | Refills: 2 | Status: SHIPPED | OUTPATIENT
Start: 2018-09-14 | End: 2018-12-14 | Stop reason: SDUPTHER

## 2018-11-14 DIAGNOSIS — J45.20 MILD INTERMITTENT ASTHMA WITHOUT COMPLICATION: ICD-10-CM

## 2018-11-14 RX ORDER — MOMETASONE FUROATE 220 UG/1
INHALANT RESPIRATORY (INHALATION)
Qty: 3 INHALER | Refills: 1 | Status: SHIPPED | OUTPATIENT
Start: 2018-11-14 | End: 2020-01-02 | Stop reason: SDUPTHER

## 2018-12-14 DIAGNOSIS — I10 ESSENTIAL HYPERTENSION: ICD-10-CM

## 2018-12-14 RX ORDER — IRBESARTAN AND HYDROCHLOROTHIAZIDE 300; 12.5 MG/1; MG/1
TABLET, FILM COATED ORAL
Qty: 30 TABLET | Refills: 2 | Status: SHIPPED | OUTPATIENT
Start: 2018-12-14 | End: 2019-01-25 | Stop reason: CLARIF

## 2019-01-25 ENCOUNTER — TELEPHONE (OUTPATIENT)
Dept: FAMILY MEDICINE CLINIC | Facility: CLINIC | Age: 84
End: 2019-01-25

## 2019-01-25 DIAGNOSIS — I10 ESSENTIAL HYPERTENSION: Primary | ICD-10-CM

## 2019-01-25 RX ORDER — LOSARTAN POTASSIUM AND HYDROCHLOROTHIAZIDE 12.5; 1 MG/1; MG/1
1 TABLET ORAL DAILY
Qty: 30 TABLET | Refills: 5 | Status: SHIPPED | OUTPATIENT
Start: 2019-01-25 | End: 2019-07-26 | Stop reason: SDUPTHER

## 2019-01-25 NOTE — TELEPHONE ENCOUNTER
Dr Fine  pt med Erika Kidd 300-12 5 tab has been recalled and pt needs a new script called into pharmacy

## 2019-02-08 RX ORDER — VALSARTAN AND HYDROCHLOROTHIAZIDE 320; 25 MG/1; MG/1
1 TABLET, FILM COATED ORAL DAILY
COMMUNITY
Start: 2014-10-30 | End: 2019-02-11

## 2019-02-08 RX ORDER — GABAPENTIN 100 MG/1
1 CAPSULE ORAL
COMMUNITY
Start: 2017-06-05 | End: 2019-02-11 | Stop reason: CLARIF

## 2019-02-08 RX ORDER — HYDROCORTISONE ACETATE 25 MG/1
1 SUPPOSITORY RECTAL
COMMUNITY
Start: 2017-09-28 | End: 2019-02-11

## 2019-02-11 ENCOUNTER — OFFICE VISIT (OUTPATIENT)
Dept: FAMILY MEDICINE CLINIC | Facility: CLINIC | Age: 84
End: 2019-02-11
Payer: MEDICARE

## 2019-02-11 VITALS
TEMPERATURE: 98.9 F | BODY MASS INDEX: 26.81 KG/M2 | SYSTOLIC BLOOD PRESSURE: 134 MMHG | HEART RATE: 68 BPM | RESPIRATION RATE: 16 BRPM | DIASTOLIC BLOOD PRESSURE: 70 MMHG | HEIGHT: 59 IN | WEIGHT: 133 LBS

## 2019-02-11 DIAGNOSIS — N18.2 CHRONIC KIDNEY DISEASE, STAGE II (MILD): ICD-10-CM

## 2019-02-11 DIAGNOSIS — Z23 NEED FOR INFLUENZA VACCINATION: ICD-10-CM

## 2019-02-11 DIAGNOSIS — Z12.39 ENCOUNTER FOR SCREENING FOR MALIGNANT NEOPLASM OF BREAST: ICD-10-CM

## 2019-02-11 DIAGNOSIS — D17.1 LIPOMA OF TORSO: ICD-10-CM

## 2019-02-11 DIAGNOSIS — Z00.00 ROUTINE MEDICAL EXAM: ICD-10-CM

## 2019-02-11 DIAGNOSIS — I10 BENIGN ESSENTIAL HYPERTENSION: Primary | ICD-10-CM

## 2019-02-11 DIAGNOSIS — E78.2 MIXED HYPERLIPIDEMIA: ICD-10-CM

## 2019-02-11 DIAGNOSIS — J45.30 MILD PERSISTENT ASTHMA WITHOUT COMPLICATION: ICD-10-CM

## 2019-02-11 DIAGNOSIS — M81.0 OSTEOPOROSIS, UNSPECIFIED OSTEOPOROSIS TYPE, UNSPECIFIED PATHOLOGICAL FRACTURE PRESENCE: ICD-10-CM

## 2019-02-11 PROCEDURE — G0008 ADMIN INFLUENZA VIRUS VAC: HCPCS | Performed by: FAMILY MEDICINE

## 2019-02-11 PROCEDURE — G0439 PPPS, SUBSEQ VISIT: HCPCS | Performed by: FAMILY MEDICINE

## 2019-02-11 PROCEDURE — 99214 OFFICE O/P EST MOD 30 MIN: CPT | Performed by: FAMILY MEDICINE

## 2019-02-11 PROCEDURE — 90686 IIV4 VACC NO PRSV 0.5 ML IM: CPT | Performed by: FAMILY MEDICINE

## 2019-02-11 NOTE — PROGRESS NOTES
Chief Complaint   Patient presents with    Medicare Wellness Visit    Follow-up     chronic conditions        Patient ID: Mariza Schwarz is a 80 y o  female  HPI  Pt is seeing for f/u HTN, Asthma, HLD, Osteoporosis -  All stable, due for labs     The following portions of the patient's history were reviewed and updated as appropriate: allergies, current medications, past family history, past medical history, past social history, past surgical history and problem list     Review of Systems   Constitutional: Negative  Respiratory: Negative  Cardiovascular: Negative  Gastrointestinal: Negative  Negative for bowel incontinence  Genitourinary: Negative  Musculoskeletal: Negative  Skin: Negative  Neurological: Negative  Psychiatric/Behavioral: The patient is not nervous/anxious  Current Outpatient Medications   Medication Sig Dispense Refill    amLODIPine (NORVASC) 5 mg tablet TAKE ONE TO TWO TABLETS BY MOUTH ONCE DAILY 60 tablet 6    ASMANEX 120 METERED DOSES 220 MCG/INH inhaler INHALE 2 PUFFS BY MOUTH ONCE DAILY 3 Inhaler 1    cycloSPORINE (RESTASIS) 0 05 % ophthalmic emulsion Administer 1 drop to both eyes 2 (two) times a day      fenofibrate (TRICOR) 145 mg tablet TAKE ONE TABLET BY MOUTH ONCE DAILY WITH FOOD 30 tablet 11    losartan-hydrochlorothiazide (HYZAAR) 100-12 5 MG per tablet Take 1 tablet by mouth daily 30 tablet 5    metoprolol succinate (TOPROL-XL) 100 mg 24 hr tablet TAKE ONE TABLET BY MOUTH ONCE DAILY AT BEDTIME 90 tablet 3     No current facility-administered medications for this visit  Objective:    /70   Pulse 68   Temp 98 9 °F (37 2 °C) (Tympanic)   Resp 16   Ht 4' 11" (1 499 m)   Wt 60 3 kg (133 lb)   BMI 26 86 kg/m²        Physical Exam   Constitutional: She is oriented to person, place, and time  She appears well-nourished  No distress  HENT:   Head: Normocephalic     Eyes: EOM are normal    Cardiovascular: Normal rate, regular rhythm and normal heart sounds  No murmur heard  Pulmonary/Chest: Effort normal and breath sounds normal  No stridor  No respiratory distress  She has no wheezes  She has no rales  Abdominal: Soft  Musculoskeletal: She exhibits no edema, tenderness or deformity  Neurological: She is alert and oriented to person, place, and time  Skin: Skin is warm  4 cm lipoma L posterior shoulder          Labs in chart were reviewed  Assessment/Plan:         Diagnoses and all orders for this visit:    Benign essential hypertension  -     Comprehensive metabolic panel; Future  -     Lipid panel; Future  stable  Encounter for screening for malignant neoplasm of breast  -     Mammo screening bilateral w cad; Future    Mild persistent asthma without complication  Stable   Chronic kidney disease, stage II (mild)  Stable  Mixed hyperlipidemia  Stable   Osteoporosis, unspecified osteoporosis type, unspecified pathological fracture presence  -     DXA bone density spine hip and pelvis;  Future    Routine medical exam    Lipoma of torso  Declined high dose flu vaccine -  Regular dose was given     rto in 6 m                             Fran Thompson MD    Assessment and Plan:    Problem List Items Addressed This Visit        Respiratory    Asthma       Cardiovascular and Mediastinum    Benign essential hypertension - Primary    Relevant Orders    Comprehensive metabolic panel    Lipid panel       Musculoskeletal and Integument    Osteoporosis    Relevant Orders    DXA bone density spine hip and pelvis       Genitourinary    Chronic kidney disease, stage II (mild)       Other    Mixed hyperlipidemia      Other Visit Diagnoses     Encounter for screening for malignant neoplasm of breast        Relevant Orders    Mammo screening bilateral w cad    Routine medical exam        Lipoma of torso            Health Maintenance Due   Topic Date Due    Medicare Annual Wellness Visit (AWV)  08/12/1931    BMI: Followup Plan 08/12/1949    BMI: Adult  08/12/1949    DTaP,Tdap,and Td Vaccines (1 - Tdap) 08/12/1952    INFLUENZA VACCINE  07/01/2018         HPI:  Genie Mai is a 80 y o  female here for her Subsequent Wellness Visit  Patient Active Problem List   Diagnosis    Asthma    Benign essential hypertension    Chronic kidney disease, stage II (mild)    Mixed hyperlipidemia    Osteoporosis     Past Medical History:   Diagnosis Date    Anesthesia     groggy, difficult to awaken    Asthma     Chronic kidney disease     stage 2 mild    Chronic pain disorder     lumbar herniated discs    Dry eyes     Hyperlipidemia     Hypertension      Past Surgical History:   Procedure Laterality Date    APPENDECTOMY      CATARACT EXTRACTION Bilateral     COLONOSCOPY N/A 10/25/2017    Procedure: COLONOSCOPY;  Surgeon: Jonathan Velasquez MD;  Location: [facility];   Service: Gastroenterology    JOINT REPLACEMENT Bilateral     partial knee     Family History   Problem Relation Age of Onset    Heart disease Father     Aneurysm Sister     Heart disease Brother     Cancer Daughter         kidney     Heart disease Brother     Heart disease Brother     Heart disease Sister         MI    Kidney disease Sister     Heart disease Daughter      Social History     Tobacco Use   Smoking Status Never Smoker   Smokeless Tobacco Never Used     Social History     Substance and Sexual Activity   Alcohol Use No      Social History     Substance and Sexual Activity   Drug Use No       Current Outpatient Medications   Medication Sig Dispense Refill    amLODIPine (NORVASC) 5 mg tablet TAKE ONE TO TWO TABLETS BY MOUTH ONCE DAILY 60 tablet 6    ASMANEX 120 METERED DOSES 220 MCG/INH inhaler INHALE 2 PUFFS BY MOUTH ONCE DAILY 3 Inhaler 1    cycloSPORINE (RESTASIS) 0 05 % ophthalmic emulsion Administer 1 drop to both eyes 2 (two) times a day      fenofibrate (TRICOR) 145 mg tablet TAKE ONE TABLET BY MOUTH ONCE DAILY WITH FOOD 30 tablet 11    losartan-hydrochlorothiazide (HYZAAR) 100-12 5 MG per tablet Take 1 tablet by mouth daily 30 tablet 5    metoprolol succinate (TOPROL-XL) 100 mg 24 hr tablet TAKE ONE TABLET BY MOUTH ONCE DAILY AT BEDTIME 90 tablet 3     No current facility-administered medications for this visit  Allergies   Allergen Reactions    Atorvastatin      Other reaction(s): Leg Cramps  Reaction Date: [id number];     Azithromycin      Other reaction(s): Unknown Allergic Reaction  Reaction Date: [id number]; Annotation - [id number]: jjw    Codeine Nausea Only     Reaction Date: [id number]; Annotation - [id number]: jjw    Moxifloxacin      Other reaction(s): Diarrhea    Penicillins Rash     Reaction Date: [id number]; Annotation - [id number]: jjw     Immunization History   Administered Date(s) Administered    Influenza Split High Dose Preservative Free IM 09/23/2014, 01/04/2016    Influenza TIV (IM) 12/13/2007, 09/25/2008, 11/13/2012    Pneumococcal Conjugate 13-Valent 06/05/2017    Pneumococcal Polysaccharide PPV23 12/13/2007       Patient Care Team:  Hemant Johnson MD as PCP - Elliot Santos MD    Medicare Screening Tests and Risk Assessments:  Laly Jospeh is here for her Subsequent Wellness visit  Last Medicare Wellness visit information reviewed, patient interviewed and updates made to the record today  Health Risk Assessment:  Patient rates overall health as good  Patient feels that their physical health rating is Same  Eyesight was rated as Same  Hearing was rated as Same  Patient feels that their emotional and mental health rating is Same  Pain experienced by patient in the last 7 days has been Some  Patient's pain rating has been 5/10  Patient states that she has experienced no weight loss or gain in last 6 months  Emotional/Mental Health:  Patient has been feeling nervous/anxious  PHQ-9 Depression Screening:    Frequency of the following problems over the past two weeks:      1   Little interest or pleasure in doing things: 0 - not at all      2  Feeling down, depressed, or hopeless: 0 - not at all  PHQ-2 Score: 0          Broken Bones/Falls: Fall Risk Assessment:    In the past year, patient has experienced: No history of falling in past year          Bladder/Bowel:  Patient has not leaked urine accidently in the last six months  Patient reports no loss of bowel control  Immunizations:  Patient has not had a flu vaccination within the last year  Patient has received a pneumonia shot  Patient has not received a shingles shot  Patient has not received tetanus/diphtheria shot  Home Safety:  Patient does not have trouble with stairs inside or outside of their home  Patient currently reports that there are no safety hazards present in home, working smoke alarms, working carbon monoxide detectors  Preventative Screenings:   Breast cancer screening performed, colon cancer screen completed, cholesterol screen completed, glaucoma eye exam completed,     Nutrition:  Current diet: Low Cholesterol and No Added Salt with servings of the following:    Medications:  Patient is currently taking over-the-counter supplements  Patient is able to manage medications  Lifestyle Choices:  Patient reports no tobacco use  Patient has not smoked or used tobacco in the past   Patient reports no alcohol use  Patient drives a vehicle  Patient wears seat belt  Activities of Daily Living:  Can get out of bed by his or her self, able to dress self, able to make own meals, able to do own shopping, able to bathe self, can do own laundry/housekeeping, can manage own money, pay bills and track expenses    Previous Hospitalizations:  No hospitalization or ED visit in past 12 months        Advanced Directives:  Patient has not decided on power of   Patient has not completed advanced directive          Preventative Screening/Counseling:      Cardiovascular:      General: Risks and Benefits Discussed      Counseling: Healthy Diet and Improve Exercise Tolerance     Due for Labs/Analytes/Optional EKG: Lipid Panel          Diabetes:      General: Risks and Benefits Discussed      Counseling: Healthy Diet      Due for labs: Blood Glucose          Colorectal Cancer:      General: Risks and Benefits Discussed and Screening Not Indicated          Breast Cancer:      General: Risks and Benefits Discussed          Cervical Cancer:      General: Screening Not Indicated          Osteoporosis:      General: Screening Current      Counseling: Calcium and Vitamin D Intake and Regular Weightbearing Exercise      Due for studies: DXA Axial          AAA:      General: Screening Not Indicated          Glaucoma:      General: Screening Current          HIV:      General: Screening Not Indicated          Hepatitis C:      General: Screening Not Indicated        Advanced Directives:   Patient has no living will for healthcare, does not have durable POA for healthcare, patient does not have an advanced directive  Information on ACP and/or AD provided       Immunizations:      Influenza: Influenza Due Today and Risks & Benefits Discussed      Pneumococcal: Lifetime Vaccine Completed      Shingrix: Shingrix Vaccine Needed Today

## 2019-02-13 DIAGNOSIS — I10 ESSENTIAL HYPERTENSION: ICD-10-CM

## 2019-02-14 RX ORDER — METOPROLOL SUCCINATE 100 MG/1
TABLET, EXTENDED RELEASE ORAL
Qty: 90 TABLET | Refills: 3 | Status: SHIPPED | OUTPATIENT
Start: 2019-02-14 | End: 2020-01-20

## 2019-02-19 ENCOUNTER — APPOINTMENT (OUTPATIENT)
Dept: LAB | Facility: CLINIC | Age: 84
End: 2019-02-19
Payer: MEDICARE

## 2019-02-19 ENCOUNTER — TRANSCRIBE ORDERS (OUTPATIENT)
Dept: LAB | Facility: CLINIC | Age: 84
End: 2019-02-19

## 2019-02-19 DIAGNOSIS — I10 BENIGN ESSENTIAL HYPERTENSION: ICD-10-CM

## 2019-02-19 LAB
ALBUMIN SERPL BCP-MCNC: 3.6 G/DL (ref 3.5–5)
ALP SERPL-CCNC: 39 U/L (ref 46–116)
ALT SERPL W P-5'-P-CCNC: 28 U/L (ref 12–78)
ANION GAP SERPL CALCULATED.3IONS-SCNC: 7 MMOL/L (ref 4–13)
AST SERPL W P-5'-P-CCNC: 19 U/L (ref 5–45)
BILIRUB SERPL-MCNC: 0.37 MG/DL (ref 0.2–1)
BUN SERPL-MCNC: 35 MG/DL (ref 5–25)
CALCIUM SERPL-MCNC: 9.4 MG/DL (ref 8.3–10.1)
CHLORIDE SERPL-SCNC: 106 MMOL/L (ref 100–108)
CHOLEST SERPL-MCNC: 242 MG/DL (ref 50–200)
CO2 SERPL-SCNC: 26 MMOL/L (ref 21–32)
CREAT SERPL-MCNC: 1.31 MG/DL (ref 0.6–1.3)
GFR SERPL CREATININE-BSD FRML MDRD: 37 ML/MIN/1.73SQ M
GLUCOSE P FAST SERPL-MCNC: 125 MG/DL (ref 65–99)
HDLC SERPL-MCNC: 54 MG/DL (ref 40–60)
LDLC SERPL CALC-MCNC: 142 MG/DL (ref 0–100)
NONHDLC SERPL-MCNC: 188 MG/DL
POTASSIUM SERPL-SCNC: 4.4 MMOL/L (ref 3.5–5.3)
PROT SERPL-MCNC: 7.1 G/DL (ref 6.4–8.2)
SODIUM SERPL-SCNC: 139 MMOL/L (ref 136–145)
TRIGL SERPL-MCNC: 228 MG/DL

## 2019-02-19 PROCEDURE — 80061 LIPID PANEL: CPT

## 2019-02-19 PROCEDURE — 80053 COMPREHEN METABOLIC PANEL: CPT

## 2019-02-19 PROCEDURE — 36415 COLL VENOUS BLD VENIPUNCTURE: CPT

## 2019-02-26 ENCOUNTER — HOSPITAL ENCOUNTER (OUTPATIENT)
Dept: RADIOLOGY | Facility: HOSPITAL | Age: 84
Discharge: HOME | End: 2019-02-26
Attending: FAMILY MEDICINE
Payer: MEDICARE

## 2019-02-26 DIAGNOSIS — M81.0 OSTEOPOROSIS, UNSPECIFIED OSTEOPOROSIS TYPE, UNSPECIFIED PATHOLOGICAL FRACTURE PRESENCE: ICD-10-CM

## 2019-02-26 DIAGNOSIS — Z12.39 ENCOUNTER FOR SCREENING FOR MALIGNANT NEOPLASM OF BREAST: ICD-10-CM

## 2019-02-26 PROCEDURE — 77067 SCR MAMMO BI INCL CAD: CPT

## 2019-02-26 PROCEDURE — 77080 DXA BONE DENSITY AXIAL: CPT

## 2019-02-27 ENCOUNTER — TELEPHONE (OUTPATIENT)
Dept: FAMILY MEDICINE CLINIC | Facility: CLINIC | Age: 84
End: 2019-02-27

## 2019-02-27 NOTE — TELEPHONE ENCOUNTER
----- Message from Valente Perkins MD sent at 2/27/2019  3:50 PM EST -----  Pl, advise pt -  Normal mammogram

## 2019-04-16 DIAGNOSIS — L29.9 PRURITUS: Primary | ICD-10-CM

## 2019-04-16 RX ORDER — HYDROXYZINE HYDROCHLORIDE 25 MG/1
TABLET, FILM COATED ORAL
Qty: 20 TABLET | Refills: 0 | Status: SHIPPED | OUTPATIENT
Start: 2019-04-16 | End: 2019-04-22

## 2019-04-22 ENCOUNTER — OFFICE VISIT (OUTPATIENT)
Dept: FAMILY MEDICINE CLINIC | Facility: CLINIC | Age: 84
End: 2019-04-22
Payer: MEDICARE

## 2019-04-22 VITALS
BODY MASS INDEX: 26.41 KG/M2 | RESPIRATION RATE: 16 BRPM | TEMPERATURE: 98.7 F | HEIGHT: 59 IN | SYSTOLIC BLOOD PRESSURE: 120 MMHG | HEART RATE: 72 BPM | DIASTOLIC BLOOD PRESSURE: 70 MMHG | WEIGHT: 131 LBS

## 2019-04-22 DIAGNOSIS — J04.0 LARYNGITIS: ICD-10-CM

## 2019-04-22 DIAGNOSIS — R05.9 COUGH: Primary | ICD-10-CM

## 2019-04-22 PROCEDURE — 99213 OFFICE O/P EST LOW 20 MIN: CPT | Performed by: FAMILY MEDICINE

## 2019-04-22 RX ORDER — ALBUTEROL SULFATE 90 UG/1
2 AEROSOL, METERED RESPIRATORY (INHALATION) EVERY 6 HOURS PRN
Qty: 1 INHALER | Refills: 2 | Status: SHIPPED | OUTPATIENT
Start: 2019-04-22

## 2019-04-22 RX ORDER — DOXYCYCLINE HYCLATE 100 MG/1
100 CAPSULE ORAL EVERY 12 HOURS SCHEDULED
Qty: 14 CAPSULE | Refills: 0 | Status: SHIPPED | OUTPATIENT
Start: 2019-04-22 | End: 2019-04-29

## 2019-04-26 ENCOUNTER — TELEPHONE (OUTPATIENT)
Dept: FAMILY MEDICINE CLINIC | Facility: CLINIC | Age: 84
End: 2019-04-26

## 2019-04-30 ENCOUNTER — TELEPHONE (OUTPATIENT)
Dept: FAMILY MEDICINE CLINIC | Facility: CLINIC | Age: 84
End: 2019-04-30

## 2019-05-19 DIAGNOSIS — E78.5 HYPERLIPIDEMIA, UNSPECIFIED HYPERLIPIDEMIA TYPE: ICD-10-CM

## 2019-05-20 RX ORDER — FENOFIBRATE 145 MG/1
TABLET, COATED ORAL
Qty: 30 TABLET | Refills: 11 | Status: SHIPPED | OUTPATIENT
Start: 2019-05-20 | End: 2022-02-03

## 2019-07-14 DIAGNOSIS — I10 ESSENTIAL HYPERTENSION: ICD-10-CM

## 2019-07-15 RX ORDER — AMLODIPINE BESYLATE 5 MG/1
TABLET ORAL
Qty: 60 TABLET | Refills: 6 | Status: SHIPPED | OUTPATIENT
Start: 2019-07-15 | End: 2020-05-14

## 2019-07-26 DIAGNOSIS — I10 ESSENTIAL HYPERTENSION: ICD-10-CM

## 2019-07-26 RX ORDER — LOSARTAN POTASSIUM AND HYDROCHLOROTHIAZIDE 12.5; 1 MG/1; MG/1
TABLET ORAL
Qty: 30 TABLET | Refills: 5 | Status: SHIPPED | OUTPATIENT
Start: 2019-07-26 | End: 2020-01-26

## 2019-10-18 ENCOUNTER — OFFICE VISIT (OUTPATIENT)
Dept: FAMILY MEDICINE CLINIC | Facility: CLINIC | Age: 84
End: 2019-10-18
Payer: MEDICARE

## 2019-10-18 VITALS
WEIGHT: 131 LBS | OXYGEN SATURATION: 96 % | RESPIRATION RATE: 16 BRPM | SYSTOLIC BLOOD PRESSURE: 156 MMHG | TEMPERATURE: 98.8 F | DIASTOLIC BLOOD PRESSURE: 84 MMHG | HEIGHT: 59 IN | HEART RATE: 60 BPM | BODY MASS INDEX: 26.41 KG/M2

## 2019-10-18 DIAGNOSIS — L65.9 HAIR LOSS: Primary | ICD-10-CM

## 2019-10-18 DIAGNOSIS — I10 BENIGN ESSENTIAL HYPERTENSION: ICD-10-CM

## 2019-10-18 DIAGNOSIS — Z23 NEED FOR INFLUENZA VACCINATION: ICD-10-CM

## 2019-10-18 DIAGNOSIS — E78.2 MIXED HYPERLIPIDEMIA: ICD-10-CM

## 2019-10-18 DIAGNOSIS — M25.512 ACUTE PAIN OF LEFT SHOULDER: ICD-10-CM

## 2019-10-18 DIAGNOSIS — J45.40 MODERATE PERSISTENT ASTHMA WITHOUT COMPLICATION: ICD-10-CM

## 2019-10-18 PROCEDURE — 90471 IMMUNIZATION ADMIN: CPT | Performed by: FAMILY MEDICINE

## 2019-10-18 PROCEDURE — 90682 RIV4 VACC RECOMBINANT DNA IM: CPT | Performed by: FAMILY MEDICINE

## 2019-10-18 PROCEDURE — 99214 OFFICE O/P EST MOD 30 MIN: CPT | Performed by: FAMILY MEDICINE

## 2019-10-18 NOTE — PROGRESS NOTES
Chief Complaint   Patient presents with    Alopecia     increased within the last 7 weeks    Shortness of Breath     Asmanex effective and assisting with this issue    requesting influenza     patient does not want the high dose vaccine - patient agreeable to flublok   multiple issues      Patient ID: Miguelito Meadows is a 80 y o  female  HPI  Pt is seeing for f/u HTN, HLD, Asthma -  All stable -  Did not realize that Asmanex was empty and was still using it for 5 days -  Started to developed SOB -  After new inhaler came from the pharmacy -  Back to baseline, also noticed more hair loss over last few wks, L shoulder acute pain after the fall in [employer] bathtub 2 wks ago  -  Trying to do exercises at home     The following portions of the patient's history were reviewed and updated as appropriate: allergies, current medications, past family history, past medical history, past social history, past surgical history and problem list     Review of Systems   Constitutional: Negative  Respiratory: Negative  Cardiovascular: Negative  Gastrointestinal: Negative  Genitourinary: Negative  Skin: Negative  Neurological: Negative  All other systems reviewed and are negative        Current Outpatient Medications   Medication Sig Dispense Refill    albuterol (PROAIR HFA) 90 mcg/act inhaler Inhale 2 puffs every 6 (six) hours as needed for wheezing 1 Inhaler 2    amLODIPine (NORVASC) 5 mg tablet TAKE 1 TO 2 TABLETS BY MOUTH ONCE DAILY 60 tablet 6    ASMANEX 120 METERED DOSES 220 MCG/INH inhaler INHALE 2 PUFFS BY MOUTH ONCE DAILY 3 Inhaler 1    cycloSPORINE (RESTASIS) 0 05 % ophthalmic emulsion Administer 1 drop to both eyes 2 (two) times a day      fenofibrate (TRICOR) 145 mg tablet TAKE 1 TABLET BY MOUTH ONCE DAILY WITH FOOD 30 tablet 11    losartan-hydrochlorothiazide (HYZAAR) 100-12 5 MG per tablet TAKE 1 TABLET BY MOUTH ONCE DAILY 30 tablet 5    metoprolol succinate (TOPROL-XL) 100 mg 24 hr tablet TAKE 1 TABLET BY MOUTH AT BEDTIME 90 tablet 3     No current facility-administered medications for this visit  Objective:    /84 (BP Location: Right arm, Patient Position: Sitting, Cuff Size: Standard)   Pulse 60   Temp 98 8 °F (37 1 °C) (Tympanic)   Resp 16   Ht 4' 11" (1 499 m)   Wt 59 4 kg (131 lb)   SpO2 96%   BMI 26 46 kg/m²        Physical Exam   Constitutional: She is oriented to person, place, and time  She appears well-nourished  No distress  HENT:   Head: Normocephalic  Eyes: EOM are normal    Cardiovascular: Normal rate, regular rhythm and normal heart sounds  No murmur heard  Pulmonary/Chest: Effort normal and breath sounds normal  No stridor  No respiratory distress  She has no wheezes  She has no rales  Abdominal: Soft  Musculoskeletal: She exhibits no edema, tenderness or deformity  Left shoulder: She exhibits decreased range of motion  Neurological: She is alert and oriented to person, place, and time  Skin: Skin is warm  Labs in chart were reviewed  Assessment/Plan:         Diagnoses and all orders for this visit:    Hair loss  -     Comprehensive metabolic panel; Future  -     CBC; Future  -     TSH, 3rd generation; Future    Need for influenza vaccination  -     FLUBLOK: influenza vaccine, quadrivalent, recombinant, PF, 0 5 mL    Mixed hyperlipidemia  -     Lipid panel; Future    Benign essential hypertension  -     Comprehensive metabolic panel; Future  -     Lipid panel; Future  -     TSH, 3rd generation; Future  Does not check BP at home -  Admits high na diet  Was advised to follow low Na diet and start checking BP at home   Cont current meds for now   Moderate persistent asthma without complication    Acute pain of left shoulder  -     Ambulatory referral to Physical Therapy; Future            BMI Counseling: Body mass index is 26 46 kg/m²  Discussed the patient's BMI with her   The BMI is above normal  Nutrition recommendations include reducing portion sizes, decreasing overall calorie intake, 3-5 servings of fruits/vegetables daily and reducing fast food intake  Exercise recommendations include exercising 3-5 times per week           rto in 3 m         Tiffani Holman MD

## 2019-10-23 ENCOUNTER — TELEPHONE (OUTPATIENT)
Dept: FAMILY MEDICINE CLINIC | Facility: CLINIC | Age: 84
End: 2019-10-23

## 2019-10-23 ENCOUNTER — APPOINTMENT (OUTPATIENT)
Dept: LAB | Facility: CLINIC | Age: 84
End: 2019-10-23
Payer: MEDICARE

## 2019-10-23 ENCOUNTER — EVALUATION (OUTPATIENT)
Dept: PHYSICAL THERAPY | Facility: CLINIC | Age: 84
End: 2019-10-23
Payer: MEDICARE

## 2019-10-23 DIAGNOSIS — E78.2 MIXED HYPERLIPIDEMIA: ICD-10-CM

## 2019-10-23 DIAGNOSIS — I10 BENIGN ESSENTIAL HYPERTENSION: ICD-10-CM

## 2019-10-23 DIAGNOSIS — M25.512 ACUTE PAIN OF LEFT SHOULDER: ICD-10-CM

## 2019-10-23 DIAGNOSIS — L65.9 HAIR LOSS: ICD-10-CM

## 2019-10-23 LAB
ALBUMIN SERPL BCP-MCNC: 4 G/DL (ref 3.5–5)
ALP SERPL-CCNC: 56 U/L (ref 46–116)
ALT SERPL W P-5'-P-CCNC: 27 U/L (ref 12–78)
ANION GAP SERPL CALCULATED.3IONS-SCNC: 7 MMOL/L (ref 4–13)
AST SERPL W P-5'-P-CCNC: 18 U/L (ref 5–45)
BILIRUB SERPL-MCNC: 0.4 MG/DL (ref 0.2–1)
BUN SERPL-MCNC: 30 MG/DL (ref 5–25)
CALCIUM SERPL-MCNC: 10 MG/DL (ref 8.3–10.1)
CHLORIDE SERPL-SCNC: 104 MMOL/L (ref 100–108)
CHOLEST SERPL-MCNC: 240 MG/DL (ref 50–200)
CO2 SERPL-SCNC: 27 MMOL/L (ref 21–32)
CREAT SERPL-MCNC: 1.3 MG/DL (ref 0.6–1.3)
ERYTHROCYTE [DISTWIDTH] IN BLOOD BY AUTOMATED COUNT: 14.3 % (ref 11.6–15.1)
GFR SERPL CREATININE-BSD FRML MDRD: 37 ML/MIN/1.73SQ M
GLUCOSE P FAST SERPL-MCNC: 121 MG/DL (ref 65–99)
HCT VFR BLD AUTO: 39.7 % (ref 34.8–46.1)
HDLC SERPL-MCNC: 50 MG/DL
HGB BLD-MCNC: 12.7 G/DL (ref 11.5–15.4)
LDLC SERPL CALC-MCNC: 143 MG/DL (ref 0–100)
MCH RBC QN AUTO: 29.3 PG (ref 26.8–34.3)
MCHC RBC AUTO-ENTMCNC: 32 G/DL (ref 31.4–37.4)
MCV RBC AUTO: 92 FL (ref 82–98)
NONHDLC SERPL-MCNC: 190 MG/DL
PLATELET # BLD AUTO: 423 THOUSANDS/UL (ref 149–390)
PMV BLD AUTO: 11 FL (ref 8.9–12.7)
POTASSIUM SERPL-SCNC: 4.2 MMOL/L (ref 3.5–5.3)
PROT SERPL-MCNC: 7.3 G/DL (ref 6.4–8.2)
RBC # BLD AUTO: 4.33 MILLION/UL (ref 3.81–5.12)
SODIUM SERPL-SCNC: 138 MMOL/L (ref 136–145)
TRIGL SERPL-MCNC: 233 MG/DL
TSH SERPL DL<=0.05 MIU/L-ACNC: 6.46 UIU/ML (ref 0.36–3.74)
WBC # BLD AUTO: 6.52 THOUSAND/UL (ref 4.31–10.16)

## 2019-10-23 PROCEDURE — 97161 PT EVAL LOW COMPLEX 20 MIN: CPT | Performed by: PHYSICAL THERAPIST

## 2019-10-23 PROCEDURE — 84443 ASSAY THYROID STIM HORMONE: CPT

## 2019-10-23 PROCEDURE — 80053 COMPREHEN METABOLIC PANEL: CPT

## 2019-10-23 PROCEDURE — 97110 THERAPEUTIC EXERCISES: CPT | Performed by: PHYSICAL THERAPIST

## 2019-10-23 PROCEDURE — 85027 COMPLETE CBC AUTOMATED: CPT

## 2019-10-23 PROCEDURE — 80061 LIPID PANEL: CPT

## 2019-10-23 PROCEDURE — 36415 COLL VENOUS BLD VENIPUNCTURE: CPT

## 2019-10-23 NOTE — PROGRESS NOTES
PT Evaluation     Today's date: 10/23/2019  Patient name: Mark Longoria  : 1931  MRN: [id number]  Referring provider: Dayan Rivera MD  Dx:   Encounter Diagnosis     ICD-10-CM    1  Acute pain of left shoulder M25 512 Ambulatory referral to Physical Therapy                  Assessment  Assessment details:  Medhat Wynne with signs and symptoms consistent with Acute pain of left shoulder, with loss of range of motion, strength and spinal stabilization  Presents with high reactivity  Mark Longoria would benefit with physical therapy to address these impairments to return to prior level of function  Impairments: abnormal or restricted ROM, activity intolerance, impaired physical strength and pain with function  Understanding of Dx/Px/POC: good   Prognosis: good    Goals  STG  Initiate HEP  Able to reach overhead with out pain in 3 weeks  LTG  Independent with HEP  Able to lift 10lbs overhead in 6 weeks  FOTO > 59      Plan  Planned therapy interventions: manual therapy, joint mobilization, neuromuscular re-education, patient education, strengthening, stretching, therapeutic exercise and home exercise program  Frequency: 2x week  Duration in visits: 12  Duration in weeks: 6  Treatment plan discussed with: patient        Subjective Evaluation    History of Present Illness  Mechanism of injury: Patient reports falling in [place] motel [place]2019 and  injuring her left shoulder, she waited for the shoulder to improve, but it has not returned to prior level of function            Recurrent probem    Quality of life: good    Pain  Current pain ratin  At best pain ratin  At worst pain ratin  Location: left shoulder  Quality: sharp  Relieving factors: rest  Aggravating factors: overhead activity and lifting  Progression: no change    Social Support  Steps to enter house: yes  Stairs in house: yes   Lives in: multiple-level home  Lives with: alone    Employment status: working  Treatments  Current treatment: physical therapy  Patient Goals  Patient goals for therapy: decreased pain, increased motion, increased strength and independence with ADLs/IADLs          Objective     Active Range of Motion   Left Shoulder   Flexion: 150 degrees with pain  Abduction: 140 degrees with pain  External rotation 0°: 60 degrees   Internal rotation 0°: 30 degrees with pain    Right Shoulder   Flexion: 180 degrees   Abduction: 180 degrees   External rotation 0°: 80 degrees   Internal rotation 0°: 70 degrees     Strength/Myotome Testing     Left Shoulder     Planes of Motion   Flexion: 3-   Abduction: 3-   External rotation at 0°: 3-   Internal rotation at 0°: 4+     Right Shoulder     Planes of Motion   Flexion: 4   Abduction: 4   External rotation at 0°: 5   Internal rotation at 0°: 5              Precautions: HTN      Manual                                                                                   Exercise Diary  10/23            SADD stage 1 perf                                                                                                                                                                                                                                                                       Modalities                                                           Patient instructed in HEP of SADD stage 1, see scanned doc

## 2019-10-23 NOTE — TELEPHONE ENCOUNTER
----- Message from Ulices Villalta MD sent at 10/23/2019  5:00 PM EDT -----  Pl, advise pt -  Dotty is needed to discuss labs

## 2019-10-25 ENCOUNTER — OFFICE VISIT (OUTPATIENT)
Dept: FAMILY MEDICINE CLINIC | Facility: CLINIC | Age: 84
End: 2019-10-25
Payer: MEDICARE

## 2019-10-25 ENCOUNTER — OFFICE VISIT (OUTPATIENT)
Dept: PHYSICAL THERAPY | Facility: CLINIC | Age: 84
End: 2019-10-25
Payer: MEDICARE

## 2019-10-25 VITALS
WEIGHT: 131 LBS | DIASTOLIC BLOOD PRESSURE: 74 MMHG | BODY MASS INDEX: 26.41 KG/M2 | HEIGHT: 59 IN | SYSTOLIC BLOOD PRESSURE: 146 MMHG | TEMPERATURE: 97.6 F | HEART RATE: 68 BPM | RESPIRATION RATE: 14 BRPM

## 2019-10-25 DIAGNOSIS — M25.512 ACUTE PAIN OF LEFT SHOULDER: Primary | ICD-10-CM

## 2019-10-25 DIAGNOSIS — E78.2 MIXED HYPERLIPIDEMIA: Primary | ICD-10-CM

## 2019-10-25 DIAGNOSIS — E03.8 SUBCLINICAL HYPOTHYROIDISM: ICD-10-CM

## 2019-10-25 PROCEDURE — 97110 THERAPEUTIC EXERCISES: CPT | Performed by: PHYSICAL THERAPIST

## 2019-10-25 PROCEDURE — 99214 OFFICE O/P EST MOD 30 MIN: CPT | Performed by: FAMILY MEDICINE

## 2019-10-25 RX ORDER — LEVOTHYROXINE SODIUM 0.03 MG/1
25 TABLET ORAL DAILY
Qty: 30 TABLET | Refills: 3 | Status: SHIPPED | OUTPATIENT
Start: 2019-10-25 | End: 2020-01-29 | Stop reason: SDUPTHER

## 2019-10-25 RX ORDER — ROSUVASTATIN CALCIUM 5 MG/1
5 TABLET, COATED ORAL EVERY OTHER DAY
Qty: 30 TABLET | Refills: 5 | Status: SHIPPED | OUTPATIENT
Start: 2019-10-25 | End: 2020-10-21

## 2019-10-25 NOTE — PROGRESS NOTES
Daily Note     Today's date: 10/25/2019  Patient name: Keenan Fermin  : 1931  MRN: [id number]  Referring provider: Michi Garcai MD  Dx:   Encounter Diagnosis     ICD-10-CM    1  Acute pain of left shoulder M25 512                   Subjective: My left shoulder is painful with reaching overhead  Objective: See treatment diary below      Assessment: Tolerated treatment well  Patient demonstrated fatigue post treatment and exhibited good technique with therapeutic exercises      Plan: Continue per plan of care        Precautions: HTN      Manual                                                                                   Exercise Diary  10/23 10/25           SADD stage 1 perf            Supine Cane Flex, ER  2x20           AROM ER supine  20x           AROM SDLY ER  15x           Prone EXT  20x           TB EXT, ER, IR  Y 3x10                                                                                                                                                                                                     Modalities

## 2019-10-25 NOTE — PATIENT INSTRUCTIONS
Cholesterol and Your Health   AMBULATORY CARE:   Cholesterol  is a waxy, fat-like substance  Cholesterol is made by your body, but also comes from certain foods you eat  Your body uses cholesterol to make hormones and new cells  Your body also uses cholesterol to protect nerves  Cholesterol comes from foods such as meat and dairy products  Your total cholesterol level is made up by LDL cholesterol, HDL cholesterol, and triglycerides:  · LDL cholesterol  is called bad cholesterol  because it forms plaque in your arteries  As plaque builds up, your arteries become narrow, and less blood flows through  When plaque decreases blood flow to your heart, you may have chest pain  If plaque completely blocks an artery that bring blood to your heart, you may have a heart attack  Plaque can break off and form blood clots  Blood clots may block arteries in your brain and cause a stroke  · HDL cholesterol  is called good cholesterol  because it helps remove LDL cholesterol from your arteries  It does this by attaching to LDL cholesterol and carrying it to your liver  Your liver breaks down LDL cholesterol so your body can get rid of it  High levels of HDL cholesterol can help prevent a heart attack and stroke  Low levels of HDL cholesterol can increase your risk for heart disease, heart attack, and stroke  · Triglycerides  are a type of fat that store energy from foods you eat  High levels of triglycerides also cause plaque buildup  This can increase your risk for a heart attack or stroke  If your triglyceride level is high, your LDL cholesterol level may also be high  How food affects your cholesterol levels:   · Unhealthy fats  increase LDL cholesterol and triglyceride levels in your blood  They are found in foods high in cholesterol, saturated fat, and trans fat:     ¨ Cholesterol  is found in eggs, dairy, and meat  ¨ Saturated fat  is found in butter, cheese, ice cream, whole milk, and coconut oil  Saturated fat is also found in meat, such as sausage, hot dogs, and bologna  ¨ Trans fat  is found in liquid oils and is used in fried and baked foods  Foods that contain trans fats include chips, crackers, muffins, sweet rolls, microwave popcorn, and cookies  · Healthy fats,  also called unsaturated fats, help lower LDL cholesterol and triglyceride levels  Healthy fats include the following:     ¨ Monounsaturated fats  are found in foods such as olive oil, canola oil, avocado, nuts, and olives  ¨ Polyunsaturated fats,  such as omega 3 fats, are found in fish, such as salmon, trout, and tuna  They can also be found in plant foods such as flaxseed, walnuts, and soybeans  Other things that affect your cholesterol levels:   · Smoking cigarettes    · Being overweight or obese     · Drinking large amounts of alcohol    · Not enough exercise or no exercise    · Certain genes passed from your parents to you  What you need to know about having your cholesterol levels checked: Adults 21to 39years of age should have their cholesterol levels checked every 4 to 6 years  Adults 45 years and older should have their cholesterol checked every 1 to 2 years  You may need your cholesterol checked more often, or at a younger age, if you have risk factors for heart disease  You may also need to have your cholesterol checked more often if you have other health conditions, such as diabetes  Blood tests are used to check cholesterol levels  Blood tests measure your levels of triglycerides, LDL cholesterol, and HDL cholesterol  Cholesterol level goals: Your cholesterol level goal may depend on your risk for heart disease  It may also depend on your age and other health conditions  Ask your healthcare provider if the following goals are right for you:  · Your total cholesterol level  should be less than 200 mg/dL  This number may also depend on your HDL and LDL cholesterol goals       · Your LDL cholesterol level  should be less than 130 mg/dL  · Your HDL cholesterol level  should be 60 mg/dL or higher  · Your triglyceride level  should be less than 150 mg/dL  Treatment for high cholesterol:  Treatment for high cholesterol will also decrease your risk of heart disease, heart attack, and stroke  Treatment may include any of the following:  · Medicines  may be given to lower your LDL cholesterol, triglyceride levels, or total cholesterol level  You may need medicines to lower your cholesterol if any of the following is true:     ¨ You have a history of stroke, TIA, unstable angina, or a heart attack    ¨ Your LDL cholesterol level is 190 mg/dL or higher    ¨ You are age 36to 76years of age, have diabetes, and your LDL cholesterol is 70 mg/dL or higher    ¨ You are age 36to 76years of age, have risk factors for heart disease, and your LDL cholesterol is 70 mg/dL or higher    · Lifestyle changes  include changes to your diet, exercise, weight loss, and quitting smoking  It also includes decreasing the amount of alcohol you drink  · Supplements  include fish oil, red yeast rice, and garlic  Fish oil may help lower your triglyceride and LDL cholesterol levels  It may also increase your HDL cholesterol level  Red yeast rice may help decrease your total cholesterol level and LDL cholesterol level  Garlic may help lower your total cholesterol level  Do not take these supplements without talking to your healthcare provider  Nutrition to help lower your cholesterol levels:  A registered dietitian can help you create a healthy eating plan  Read food labels and choose foods low in saturated fat, trans fats, and cholesterol  · Decrease the total amount of fat you eat  Choose lean meats, fat-free or 1% fat milk, and low-fat dairy products, such as yogurt and cheese  Try to limit or avoid red meats  Limit or do not eat fried foods or baked goods such as cookies  · Replace unhealthy fats with healthy fats    Cook foods in olive oil or canola oil  Choose soft margarines that are low in saturated fat and trans fat  Seeds, nuts, and avocados are other examples of healthy fats  · Eat foods with omega-3 fats  Examples include salmon, tuna, mackerel, walnuts, and flaxseed  Eat fish 2 times per week  Children and pregnant women should not eat fish that have high levels of mercury, such as shark, swordfish, and ariana mackerel  · Increase the amount of plant-based foods you eat  Plant-based foods are low in cholesterol and fat  Eating more of these foods may help lower your cholesterol and help you lose weight  Examples of plant-based foods includes fruits, vegetables, legumes, and whole grains  Replace milk that contains dairy with almond, soy, or coconut milk  Eat beans and foods with soy for protein instead of meat  Ask your healthcare provider or dietitian for more information on plant-based foods  · Increase the amount of fiber you eat  High-fiber foods can help lower your LDL cholesterol  You should eat between 20 and 30 grams of fiber each day  Eat at least 5 servings of fruits and vegetables each day  Other examples of high-fiber foods include whole-grain or whole-wheat breads, pastas, or cereals, and brown rice  Eat 3 ounces of whole-grain foods each day  Increase fiber slowly  You may have abdominal discomfort, bloating, and gas if you add fiber to your diet too quickly  Lifestyle changes you can make to help lower your cholesterol levels:   · Maintain a healthy weight  Ask your healthcare provider how much you should weigh  Ask him or her to help you create a weight loss plan if you are overweight  Weight loss can decrease your total cholesterol and triglyceride levels  · Exercise regularly  Exercise can help lower your total cholesterol level and maintain a healthy weight  Exercise can also help increase your HDL cholesterol level   Work with your healthcare provider to create an exercise program that is right for you  Get at least 30 minutes of moderate exercise most days of the week  Examples of exercise include brisk walking, swimming, or biking  · Do not smoke  Nicotine and other chemicals in cigarettes and cigars can damage your lungs, heart, and blood vessels  They can also raise your triglyceride levels  Ask your healthcare provider for information if you currently smoke and need help to quit  E-cigarettes or smokeless tobacco still contain nicotine  Talk to your healthcare provider before you use these products  · Limit or do not drink alcohol  Alcohol can increase your triglyceride levels  Ask your healthcare provider if it is safe for you to drink alcohol  Also ask how much is safe for you to drink each day  © 2017 [facility] Information is for End User's use only and may not be sold, redistributed or otherwise used for commercial purposes  All illustrations and images included in CareNotes® are the copyrighted property of [employer]  or Luis Enrique Salazar  The above information is an  only  It is not intended as medical advice for individual conditions or treatments  Talk to your doctor, nurse or pharmacist before following any medical regimen to see if it is safe and effective for you

## 2019-10-29 ENCOUNTER — OFFICE VISIT (OUTPATIENT)
Dept: PHYSICAL THERAPY | Facility: CLINIC | Age: 84
End: 2019-10-29
Payer: MEDICARE

## 2019-10-29 DIAGNOSIS — M25.512 ACUTE PAIN OF LEFT SHOULDER: Primary | ICD-10-CM

## 2019-10-29 PROCEDURE — 97110 THERAPEUTIC EXERCISES: CPT | Performed by: PHYSICAL THERAPIST

## 2019-10-29 PROCEDURE — 97112 NEUROMUSCULAR REEDUCATION: CPT | Performed by: PHYSICAL THERAPIST

## 2019-10-29 NOTE — PROGRESS NOTES
Daily Note     Today's date: 10/29/2019  Patient name: Matilda Gamez  : 1931  MRN: [id number]  Referring provider: Gabriel Lima MD  Dx: No diagnosis found  Subjective: My shoulder pain is less  Objective: See treatment diary below      Assessment: Tolerated treatment well  Patient demonstrated fatigue post treatment and exhibited good technique with therapeutic exercises      Plan: Continue per plan of care        Precautions: HTN      Manual                                                                                   Exercise Diary  10/23 10/25 10/29          SADD stage 1 perf            Supine Cane Flex, ER  2x20 2x20          AROM ER supine  20x 20x          AROM SDLY ER  15x 20x          Prone EXT  20x 20x          TB EXT, ER, IR  Y 3x10 Y 3x10          Yepez   #3 20x                                                                                                                                                                                       Modalities

## 2019-10-31 ENCOUNTER — OFFICE VISIT (OUTPATIENT)
Dept: PHYSICAL THERAPY | Facility: CLINIC | Age: 84
End: 2019-10-31
Payer: MEDICARE

## 2019-10-31 DIAGNOSIS — M25.512 ACUTE PAIN OF LEFT SHOULDER: Primary | ICD-10-CM

## 2019-10-31 PROCEDURE — 97110 THERAPEUTIC EXERCISES: CPT | Performed by: PHYSICAL THERAPIST

## 2019-10-31 PROCEDURE — 97112 NEUROMUSCULAR REEDUCATION: CPT | Performed by: PHYSICAL THERAPIST

## 2019-10-31 NOTE — PROGRESS NOTES
Daily Note     Today's date: 10/31/2019  Patient name: Winthrop Sicard  : 1931  MRN: [id number]  Referring provider: Chelo Nails MD  Dx:   Encounter Diagnosis     ICD-10-CM    1  Acute pain of left shoulder M25 512                   Subjective: My shoulder is sore  Objective: See treatment diary below      Assessment: Tolerated treatment well  Patient demonstrated fatigue post treatment and exhibited good technique with therapeutic exercises      Plan: Continue per plan of care        Precautions: HTN      Manual                                                                                   Exercise Diary  10/23 10/25 10/29 10/31         SADD stage 1 perf            Supine Cane Flex, ER  2x20 2x20 2x20         AROM ER supine  20x 20x 20x         AROM SDLY ER  15x 20x 20x         Prone EXT  20x 20x 20x         TB EXT, ER, IR  Y 3x10 Y 3x10 R 3x10         Yepez   #3 20x #3 20x                                                                                                                                                                                      Modalities

## 2019-11-05 ENCOUNTER — OFFICE VISIT (OUTPATIENT)
Dept: PHYSICAL THERAPY | Facility: CLINIC | Age: 84
End: 2019-11-05
Payer: MEDICARE

## 2019-11-05 DIAGNOSIS — M25.512 ACUTE PAIN OF LEFT SHOULDER: Primary | ICD-10-CM

## 2019-11-05 PROCEDURE — 97110 THERAPEUTIC EXERCISES: CPT | Performed by: PHYSICAL THERAPIST

## 2019-11-05 PROCEDURE — 97112 NEUROMUSCULAR REEDUCATION: CPT | Performed by: PHYSICAL THERAPIST

## 2019-11-05 NOTE — PROGRESS NOTES
Daily Note     Today's date: 2019  Patient name: Hussein Baum  : 1931  MRN: [id number]  Referring provider: Hilario Jolly MD  Dx: No diagnosis found  Subjective: There is less clicking in the joint  Objective: See treatment diary below      Assessment: Tolerated treatment well  Patient demonstrated fatigue post treatment and exhibited good technique with therapeutic exercises      Plan: Continue per plan of care        Precautions: HTN      Manual                                                                                   Exercise Diary  10/23 10/25 10/29 10/31 11/5        SADD stage 1 perf            Supine Cane Flex, ER  2x20 2x20 2x20 2x20        AROM ER supine  20x 20x 20x 20x        AROM SDLY ER  15x 20x 20x 20x        Prone EXT  20x 20x 20x 20x        TB EXT, ER, IR  Y 3x10 Y 3x10 R 3x10 R 3x10        Yepez   #3 20x #3 20x #3 20x                                                                                                                                                                                     Modalities

## 2019-11-07 ENCOUNTER — OFFICE VISIT (OUTPATIENT)
Dept: PHYSICAL THERAPY | Facility: CLINIC | Age: 84
End: 2019-11-07
Payer: MEDICARE

## 2019-11-07 DIAGNOSIS — M25.512 ACUTE PAIN OF LEFT SHOULDER: Primary | ICD-10-CM

## 2019-11-07 PROCEDURE — 97112 NEUROMUSCULAR REEDUCATION: CPT

## 2019-11-07 PROCEDURE — 97110 THERAPEUTIC EXERCISES: CPT

## 2019-11-12 ENCOUNTER — OFFICE VISIT (OUTPATIENT)
Dept: PHYSICAL THERAPY | Facility: CLINIC | Age: 84
End: 2019-11-12
Payer: MEDICARE

## 2019-11-12 DIAGNOSIS — M25.512 ACUTE PAIN OF LEFT SHOULDER: Primary | ICD-10-CM

## 2019-11-12 PROCEDURE — 97110 THERAPEUTIC EXERCISES: CPT

## 2019-11-12 PROCEDURE — 97112 NEUROMUSCULAR REEDUCATION: CPT

## 2019-11-12 NOTE — PROGRESS NOTES
Daily Note     Today's date: 2019  Patient name: Marbella Clemons  : 1931  MRN: [id number]  Referring provider: Aliza Olivo MD  Dx:   Encounter Diagnosis     ICD-10-CM    1  Acute pain of left shoulder M25 512                   Subjective: My shoulder is less sore  Objective: See treatment diary below      Assessment: Tolerated treatment well  Patient demonstrated fatigue post treatment      Plan: Progress treatment as tolerated         Precautions: HTN      Manual                                                                                   Exercise Diary  10/23 10/25 10/29 10/31 11/5 11/7 11/12      SADD stage 1 perf     hep hep      Supine Cane Flex, ER  2x20 2x20 2x20 2x20 hep hep      AROM ER supine  20x 20x 20x 20x hep hep      AROM SDLY ER  15x 20x 20x 20x hep hep      Prone EXT  20x 20x 20x 20x 2# x10 reps 2# x10 reps      TB EXT, ER, IR  Y 3x10 Y 3x10 R 3x10 R 3x10 Red 3x10 Red 3x10      Yepez   #3 20x #3 20x #3 20x 3# x20 reps 3# x20 reps      NuStep      10min L1 15min L1      pulleys      10x 5 sec hold 10x5 sec hold      TB rows      Red x20 reps Red x20 reps      Prone serratus       2# x 20 reps 2# x10 reps                                                                                                                               Modalities

## 2019-11-14 ENCOUNTER — OFFICE VISIT (OUTPATIENT)
Dept: PHYSICAL THERAPY | Facility: CLINIC | Age: 84
End: 2019-11-14
Payer: MEDICARE

## 2019-11-14 DIAGNOSIS — M25.512 ACUTE PAIN OF LEFT SHOULDER: Primary | ICD-10-CM

## 2019-11-14 PROCEDURE — 97112 NEUROMUSCULAR REEDUCATION: CPT

## 2019-11-14 PROCEDURE — 97110 THERAPEUTIC EXERCISES: CPT

## 2019-11-14 NOTE — PROGRESS NOTES
Daily Note     Today's date: 2019  Patient name: Melly Ellsworth  : 1931  MRN: [id number]  Referring provider: Albina Adrian MD  Dx:   Encounter Diagnosis     ICD-10-CM    1  Acute pain of left shoulder M25 512                   Subjective: My shoulder is very "crunchy"  Objective: See treatment diary below      Assessment: Tolerated treatment well  Patient demonstrated fatigue post treatment      Plan: Progress treatment as tolerated         Precautions: HTN      Manual                                                                                   Exercise Diary  10/23 10/25 10/29 10/31 11/5 11/7 11/12 11/14     SADD stage 1 perf     hep hep hep     Supine Cane Flex, ER  2x20 2x20 2x20 2x20 hep hep hep     AROM ER supine  20x 20x 20x 20x hep hep hep     AROM SDLY ER  15x 20x 20x 20x hep hep hep     Prone EXT  20x 20x 20x 20x 2# x10 reps 2# x10 reps 2# x 20 reps     TB EXT, ER, IR  Y 3x10 Y 3x10 R 3x10 R 3x10 Red 3x10 Red 3x10 Red 3x10     Yepez   #3 20x #3 20x #3 20x 3# x20 reps 3# x20 reps Prone row3# x20 reps     NuStep      10min L1 15min L1 15min L1     pulleys      10x 5 sec hold 10x5 sec hold 10x5 sec     TB rows      Red x20 reps Red x20 reps green x20 reps     Prone serratus       2# x 20 reps 2# x10 reps 2# x10  reps     R/S ball vs wall CW/CCW        20/20                                                                                                                 Modalities

## 2019-11-19 ENCOUNTER — OFFICE VISIT (OUTPATIENT)
Dept: PHYSICAL THERAPY | Facility: CLINIC | Age: 84
End: 2019-11-19
Payer: MEDICARE

## 2019-11-19 DIAGNOSIS — M25.512 ACUTE PAIN OF LEFT SHOULDER: Primary | ICD-10-CM

## 2019-11-19 PROCEDURE — 97112 NEUROMUSCULAR REEDUCATION: CPT

## 2019-11-19 PROCEDURE — 97110 THERAPEUTIC EXERCISES: CPT

## 2019-11-19 NOTE — PROGRESS NOTES
Daily Note     Today's date: 2019  Patient name: Melly Ellwsorth  : 1931  MRN: [id number]  Referring provider: Albina Adrian MD  Dx:   Encounter Diagnosis     ICD-10-CM    1  Acute pain of left shoulder M25 512                   Subjective: Continued difficulty putting things on a high shelf  Objective: See treatment diary below      Assessment: Tolerated treatment well  Patient demonstrated fatigue post treatment      Plan: Progress treatment as tolerated         Precautions: HTN      Manual                                                                                   Exercise Diary  10/23 10/25 10/29 10/31 11/5 11/7 11/12 11/14 11/19    SADD stage 1 perf     hep hep hep hep    Supine Cane Flex, ER  2x20 2x20 2x20 2x20 hep hep hep hep    AROM ER supine  20x 20x 20x 20x hep hep hep hep    AROM SDLY ER  15x 20x 20x 20x hep hep hep hep    Prone EXT  20x 20x 20x 20x 2# x10 reps 2# x10 reps 2# x 20 reps 2# x 15 reps    TB EXT, ER, IR  Y 3x10 Y 3x10 R 3x10 R 3x10 Red 3x10 Red 3x10 Red 3x10 Red 3x10 reps    Felisha Rinaldi   #3 20x #3 20x #3 20x 3# x20 reps 3# x20 reps Prone row3# x20 reps 3# rows x20 reps    NuStep      10min L1 15min L1 15min L1 15min L1    pulleys      10x 5 sec hold 10x5 sec hold 10x5 sec 10x5 sec hold    TB rows      Red x20 reps Red x20 reps green x20 reps Gr x20 reps    Prone serratus       2# x 20 reps 2# x10 reps 2# x10  reps ---    R/S ball vs wall CW/CCW        / 20/20    FIS w/blue pb         20x    Wall lifts          10x                                                                                      Modalities

## 2019-11-21 ENCOUNTER — OFFICE VISIT (OUTPATIENT)
Dept: PHYSICAL THERAPY | Facility: CLINIC | Age: 84
End: 2019-11-21
Payer: MEDICARE

## 2019-11-21 DIAGNOSIS — M25.512 ACUTE PAIN OF LEFT SHOULDER: Primary | ICD-10-CM

## 2019-11-21 PROCEDURE — 97110 THERAPEUTIC EXERCISES: CPT

## 2019-11-21 PROCEDURE — 97112 NEUROMUSCULAR REEDUCATION: CPT

## 2019-11-21 NOTE — PROGRESS NOTES
Daily Note     Today's date: 2019  Patient name: Keenan Fermin  : 1931  MRN: [id number]  Referring provider: Michi Garcia MD  Dx:   Encounter Diagnosis     ICD-10-CM    1  Acute pain of left shoulder M25 512                   Subjective: Overall improvement, except overhead reaching remains difficult  Objective: See treatment diary below      Assessment: Tolerated treatment well  Patient exhibited good technique with therapeutic exercises      Plan: D/C with HEP       Precautions: HTN      Manual                                                                                   Exercise Diary  10/23 10/25 10/29 10/31 11/5 11/7 11/12 11/14 11/19 11/21   SADD stage 1 perf     hep hep hep hep    Supine Cane Flex, ER  2x20 2x20 2x20 2x20 hep hep hep hep    AROM ER supine  20x 20x 20x 20x hep hep hep hep    AROM SDLY ER  15x 20x 20x 20x hep hep hep hep    Prone EXT  20x 20x 20x 20x 2# x10 reps 2# x10 reps 2# x 20 reps 2# x 15 reps 3# x20 reps   TB EXT, ER, IR  Y 3x10 Y 3x10 R 3x10 R 3x10 Red 3x10 Red 3x10 Red 3x10 Red 3x10 reps Red 3x10   Yepez   #3 20x #3 20x #3 20x 3# x20 reps 3# x20 reps Prone row3# x20 reps 3# rows x20 reps 3# x20 reps   NuStep      10min L1 15min L1 15min L1 15min L1 15min L1   pulleys      10x 5 sec hold 10x5 sec hold 10x5 sec 10x5 sec hold [id number]   TB rows      Red x20 reps Red x20 reps green x20 reps Gr x20 reps Gr x20 reps   Prone serratus       2# x 20 reps 2# x10 reps 2# x10  reps --- 2# 2x10 reps   R/S ball vs wall CW/CCW        20/20 20/20 20/20   FIS w/blue pb         20x 20x   Wall lifts          10x 10x                                                                                     Modalities

## 2019-11-26 ENCOUNTER — APPOINTMENT (OUTPATIENT)
Dept: PHYSICAL THERAPY | Facility: CLINIC | Age: 84
End: 2019-11-26
Payer: MEDICARE

## 2019-11-29 ENCOUNTER — APPOINTMENT (OUTPATIENT)
Dept: PHYSICAL THERAPY | Facility: CLINIC | Age: 84
End: 2019-11-29
Payer: MEDICARE

## 2020-01-02 DIAGNOSIS — J45.20 MILD INTERMITTENT ASTHMA WITHOUT COMPLICATION: ICD-10-CM

## 2020-01-20 DIAGNOSIS — I10 ESSENTIAL HYPERTENSION: ICD-10-CM

## 2020-01-20 RX ORDER — METOPROLOL SUCCINATE 100 MG/1
TABLET, EXTENDED RELEASE ORAL
Qty: 90 TABLET | Refills: 0 | Status: SHIPPED | OUTPATIENT
Start: 2020-01-20 | End: 2020-05-14

## 2020-01-21 ENCOUNTER — APPOINTMENT (OUTPATIENT)
Dept: LAB | Facility: CLINIC | Age: 85
End: 2020-01-21
Payer: MEDICARE

## 2020-01-21 DIAGNOSIS — E03.8 SUBCLINICAL HYPOTHYROIDISM: ICD-10-CM

## 2020-01-21 DIAGNOSIS — E78.2 MIXED HYPERLIPIDEMIA: ICD-10-CM

## 2020-01-21 LAB
ALBUMIN SERPL BCP-MCNC: 3.3 G/DL (ref 3.5–5)
ALP SERPL-CCNC: 88 U/L (ref 46–116)
ALT SERPL W P-5'-P-CCNC: 25 U/L (ref 12–78)
ANION GAP SERPL CALCULATED.3IONS-SCNC: 6 MMOL/L (ref 4–13)
AST SERPL W P-5'-P-CCNC: 14 U/L (ref 5–45)
BILIRUB SERPL-MCNC: 0.51 MG/DL (ref 0.2–1)
BUN SERPL-MCNC: 22 MG/DL (ref 5–25)
CALCIUM SERPL-MCNC: 9.9 MG/DL (ref 8.3–10.1)
CHLORIDE SERPL-SCNC: 107 MMOL/L (ref 100–108)
CHOLEST SERPL-MCNC: 182 MG/DL (ref 50–200)
CO2 SERPL-SCNC: 25 MMOL/L (ref 21–32)
CREAT SERPL-MCNC: 1.02 MG/DL (ref 0.6–1.3)
GFR SERPL CREATININE-BSD FRML MDRD: 49 ML/MIN/1.73SQ M
GLUCOSE P FAST SERPL-MCNC: 107 MG/DL (ref 65–99)
HDLC SERPL-MCNC: 51 MG/DL
LDLC SERPL CALC-MCNC: 85 MG/DL (ref 0–100)
NONHDLC SERPL-MCNC: 131 MG/DL
POTASSIUM SERPL-SCNC: 4.2 MMOL/L (ref 3.5–5.3)
PROT SERPL-MCNC: 7 G/DL (ref 6.4–8.2)
SODIUM SERPL-SCNC: 138 MMOL/L (ref 136–145)
TRIGL SERPL-MCNC: 231 MG/DL
TSH SERPL DL<=0.05 MIU/L-ACNC: 5.35 UIU/ML (ref 0.36–3.74)

## 2020-01-21 PROCEDURE — 80053 COMPREHEN METABOLIC PANEL: CPT

## 2020-01-21 PROCEDURE — 84443 ASSAY THYROID STIM HORMONE: CPT

## 2020-01-21 PROCEDURE — 80061 LIPID PANEL: CPT

## 2020-01-21 PROCEDURE — 36415 COLL VENOUS BLD VENIPUNCTURE: CPT

## 2020-01-25 DIAGNOSIS — I10 ESSENTIAL HYPERTENSION: ICD-10-CM

## 2020-01-26 RX ORDER — LOSARTAN POTASSIUM AND HYDROCHLOROTHIAZIDE 12.5; 1 MG/1; MG/1
TABLET ORAL
Qty: 30 TABLET | Refills: 0 | Status: SHIPPED | OUTPATIENT
Start: 2020-01-26 | End: 2020-02-24

## 2020-01-29 ENCOUNTER — TELEPHONE (OUTPATIENT)
Dept: FAMILY MEDICINE CLINIC | Facility: CLINIC | Age: 85
End: 2020-01-29

## 2020-01-29 DIAGNOSIS — E03.8 SUBCLINICAL HYPOTHYROIDISM: ICD-10-CM

## 2020-01-29 RX ORDER — LEVOTHYROXINE SODIUM 0.05 MG/1
25 TABLET ORAL DAILY
Qty: 30 TABLET | Refills: 3 | Status: SHIPPED | OUTPATIENT
Start: 2020-01-29 | End: 2020-01-31 | Stop reason: SDUPTHER

## 2020-01-30 NOTE — TELEPHONE ENCOUNTER
LM with results -  All labs are good except for TSH is little elevated still -  Thyroid med dose needs to be increased to 50 mcg -  New Rx was sent

## 2020-01-31 ENCOUNTER — TELEPHONE (OUTPATIENT)
Dept: FAMILY MEDICINE CLINIC | Facility: CLINIC | Age: 85
End: 2020-01-31

## 2020-01-31 DIAGNOSIS — E03.8 SUBCLINICAL HYPOTHYROIDISM: ICD-10-CM

## 2020-01-31 RX ORDER — LEVOTHYROXINE SODIUM 0.05 MG/1
50 TABLET ORAL DAILY
Qty: 90 TABLET | Refills: 1 | Status: SHIPPED | OUTPATIENT
Start: 2020-01-31 | End: 2020-08-14

## 2020-02-23 DIAGNOSIS — I10 ESSENTIAL HYPERTENSION: ICD-10-CM

## 2020-02-24 RX ORDER — LOSARTAN POTASSIUM AND HYDROCHLOROTHIAZIDE 12.5; 1 MG/1; MG/1
TABLET ORAL
Qty: 30 TABLET | Refills: 3 | Status: SHIPPED | OUTPATIENT
Start: 2020-02-24 | End: 2020-05-22

## 2020-03-25 ENCOUNTER — TELEPHONE (OUTPATIENT)
Dept: FAMILY MEDICINE CLINIC | Facility: CLINIC | Age: 85
End: 2020-03-25

## 2020-03-25 DIAGNOSIS — J45.40 MODERATE PERSISTENT ASTHMA WITHOUT COMPLICATION: Primary | ICD-10-CM

## 2020-03-25 NOTE — TELEPHONE ENCOUNTER
Pl, advise pt -  Different inhaler Qvar was sent -  Needs to be used 2 times a day -  Con to rinse mouth after each use

## 2020-03-25 NOTE — TELEPHONE ENCOUNTER
Dr Woody Fonseca    Patient has had to replace asmana inhaler 2 times since December as counter is not working  Instead of going down, it goes up  Patient would like rx for different inhaler sent to [facility]

## 2020-05-13 DIAGNOSIS — I10 ESSENTIAL HYPERTENSION: ICD-10-CM

## 2020-05-14 RX ORDER — METOPROLOL SUCCINATE 100 MG/1
TABLET, EXTENDED RELEASE ORAL
Qty: 90 TABLET | Refills: 0 | Status: SHIPPED | OUTPATIENT
Start: 2020-05-14 | End: 2020-05-22 | Stop reason: SDUPTHER

## 2020-05-14 RX ORDER — AMLODIPINE BESYLATE 5 MG/1
TABLET ORAL
Qty: 60 TABLET | Refills: 0 | Status: SHIPPED | OUTPATIENT
Start: 2020-05-14 | End: 2020-05-22 | Stop reason: SDUPTHER

## 2020-05-20 ENCOUNTER — TELEPHONE (OUTPATIENT)
Dept: FAMILY MEDICINE CLINIC | Facility: CLINIC | Age: 85
End: 2020-05-20

## 2020-05-22 ENCOUNTER — TELEMEDICINE (OUTPATIENT)
Dept: FAMILY MEDICINE CLINIC | Facility: CLINIC | Age: 85
End: 2020-05-22
Payer: MEDICARE

## 2020-05-22 VITALS — DIASTOLIC BLOOD PRESSURE: 80 MMHG | HEART RATE: 54 BPM | SYSTOLIC BLOOD PRESSURE: 140 MMHG

## 2020-05-22 DIAGNOSIS — E03.8 SUBCLINICAL HYPOTHYROIDISM: ICD-10-CM

## 2020-05-22 DIAGNOSIS — I10 ESSENTIAL HYPERTENSION: ICD-10-CM

## 2020-05-22 DIAGNOSIS — Z00.00 MEDICARE ANNUAL WELLNESS VISIT, SUBSEQUENT: ICD-10-CM

## 2020-05-22 DIAGNOSIS — J45.40 MODERATE PERSISTENT ASTHMA WITHOUT COMPLICATION: Primary | ICD-10-CM

## 2020-05-22 PROCEDURE — 3077F SYST BP >= 140 MM HG: CPT | Performed by: FAMILY MEDICINE

## 2020-05-22 PROCEDURE — G0439 PPPS, SUBSEQ VISIT: HCPCS | Performed by: FAMILY MEDICINE

## 2020-05-22 PROCEDURE — 1125F AMNT PAIN NOTED PAIN PRSNT: CPT | Performed by: FAMILY MEDICINE

## 2020-05-22 PROCEDURE — 1036F TOBACCO NON-USER: CPT | Performed by: FAMILY MEDICINE

## 2020-05-22 PROCEDURE — 4040F PNEUMOC VAC/ADMIN/RCVD: CPT | Performed by: FAMILY MEDICINE

## 2020-05-22 PROCEDURE — 1170F FXNL STATUS ASSESSED: CPT | Performed by: FAMILY MEDICINE

## 2020-05-22 PROCEDURE — 99214 OFFICE O/P EST MOD 30 MIN: CPT | Performed by: FAMILY MEDICINE

## 2020-05-22 PROCEDURE — 1160F RVW MEDS BY RX/DR IN RCRD: CPT | Performed by: FAMILY MEDICINE

## 2020-05-22 PROCEDURE — 3079F DIAST BP 80-89 MM HG: CPT | Performed by: FAMILY MEDICINE

## 2020-05-22 RX ORDER — AMLODIPINE BESYLATE 5 MG/1
5 TABLET ORAL 2 TIMES DAILY
Qty: 60 TABLET | Refills: 0 | Status: SHIPPED | OUTPATIENT
Start: 2020-05-22 | End: 2020-06-13

## 2020-05-22 RX ORDER — TELMISARTAN AND HYDROCHLORTHIAZIDE 80; 12.5 MG/1; MG/1
1 TABLET ORAL DAILY
Qty: 30 TABLET | Refills: 3 | Status: SHIPPED | OUTPATIENT
Start: 2020-05-22 | End: 2020-09-21

## 2020-05-22 RX ORDER — METOPROLOL SUCCINATE 50 MG/1
50 TABLET, EXTENDED RELEASE ORAL
Qty: 90 TABLET | Refills: 1 | Status: SHIPPED | OUTPATIENT
Start: 2020-05-22 | End: 2020-11-18

## 2020-06-13 DIAGNOSIS — I10 ESSENTIAL HYPERTENSION: ICD-10-CM

## 2020-06-13 RX ORDER — AMLODIPINE BESYLATE 5 MG/1
TABLET ORAL
Qty: 60 TABLET | Refills: 0 | Status: SHIPPED | OUTPATIENT
Start: 2020-06-13 | End: 2020-08-20

## 2020-06-23 ENCOUNTER — OFFICE VISIT (OUTPATIENT)
Dept: FAMILY MEDICINE CLINIC | Facility: CLINIC | Age: 85
End: 2020-06-23
Payer: MEDICARE

## 2020-06-23 VITALS
TEMPERATURE: 98.4 F | DIASTOLIC BLOOD PRESSURE: 62 MMHG | RESPIRATION RATE: 14 BRPM | SYSTOLIC BLOOD PRESSURE: 130 MMHG | WEIGHT: 134 LBS | HEART RATE: 66 BPM | HEIGHT: 60 IN | BODY MASS INDEX: 26.31 KG/M2

## 2020-06-23 DIAGNOSIS — R14.0 BLOATING: Primary | ICD-10-CM

## 2020-06-23 PROCEDURE — 1036F TOBACCO NON-USER: CPT | Performed by: FAMILY MEDICINE

## 2020-06-23 PROCEDURE — 3008F BODY MASS INDEX DOCD: CPT | Performed by: FAMILY MEDICINE

## 2020-06-23 PROCEDURE — 3075F SYST BP GE 130 - 139MM HG: CPT | Performed by: FAMILY MEDICINE

## 2020-06-23 PROCEDURE — 3078F DIAST BP <80 MM HG: CPT | Performed by: FAMILY MEDICINE

## 2020-06-23 PROCEDURE — 4040F PNEUMOC VAC/ADMIN/RCVD: CPT | Performed by: FAMILY MEDICINE

## 2020-06-23 PROCEDURE — 99213 OFFICE O/P EST LOW 20 MIN: CPT | Performed by: FAMILY MEDICINE

## 2020-06-23 PROCEDURE — 1160F RVW MEDS BY RX/DR IN RCRD: CPT | Performed by: FAMILY MEDICINE

## 2020-08-13 DIAGNOSIS — E03.8 SUBCLINICAL HYPOTHYROIDISM: ICD-10-CM

## 2020-08-14 RX ORDER — LEVOTHYROXINE SODIUM 0.05 MG/1
TABLET ORAL
Qty: 90 TABLET | Refills: 1 | Status: SHIPPED | OUTPATIENT
Start: 2020-08-14 | End: 2021-02-08

## 2020-08-20 DIAGNOSIS — I10 ESSENTIAL HYPERTENSION: ICD-10-CM

## 2020-08-20 RX ORDER — AMLODIPINE BESYLATE 5 MG/1
TABLET ORAL
Qty: 60 TABLET | Refills: 6 | Status: SHIPPED | OUTPATIENT
Start: 2020-08-20 | End: 2021-08-27

## 2020-09-19 DIAGNOSIS — I10 ESSENTIAL HYPERTENSION: ICD-10-CM

## 2020-09-21 RX ORDER — TELMISARTAN AND HYDROCHLORTHIAZIDE 80; 12.5 MG/1; MG/1
TABLET ORAL
Qty: 30 TABLET | Refills: 6 | Status: SHIPPED | OUTPATIENT
Start: 2020-09-21 | End: 2021-04-21

## 2020-10-21 DIAGNOSIS — E78.2 MIXED HYPERLIPIDEMIA: ICD-10-CM

## 2020-10-21 RX ORDER — ROSUVASTATIN CALCIUM 5 MG/1
TABLET, COATED ORAL
Qty: 30 TABLET | Refills: 0 | Status: SHIPPED | OUTPATIENT
Start: 2020-10-21 | End: 2020-12-23

## 2020-10-26 ENCOUNTER — OFFICE VISIT (OUTPATIENT)
Dept: FAMILY MEDICINE CLINIC | Facility: CLINIC | Age: 85
End: 2020-10-26
Payer: MEDICARE

## 2020-10-26 ENCOUNTER — TRANSCRIBE ORDERS (OUTPATIENT)
Dept: LAB | Facility: CLINIC | Age: 85
End: 2020-10-26

## 2020-10-26 ENCOUNTER — LAB (OUTPATIENT)
Dept: LAB | Facility: CLINIC | Age: 85
End: 2020-10-26
Payer: MEDICARE

## 2020-10-26 VITALS
DIASTOLIC BLOOD PRESSURE: 76 MMHG | HEIGHT: 60 IN | WEIGHT: 139 LBS | HEART RATE: 54 BPM | TEMPERATURE: 97.8 F | BODY MASS INDEX: 27.29 KG/M2 | RESPIRATION RATE: 14 BRPM | SYSTOLIC BLOOD PRESSURE: 160 MMHG

## 2020-10-26 DIAGNOSIS — R19.8 BOWEL MOVEMENT SYMPTOM: ICD-10-CM

## 2020-10-26 DIAGNOSIS — Z23 NEED FOR INFLUENZA VACCINATION: ICD-10-CM

## 2020-10-26 DIAGNOSIS — R14.0 ABDOMINAL BLOATING: ICD-10-CM

## 2020-10-26 DIAGNOSIS — R19.8 BOWEL MOVEMENT SYMPTOM: Primary | ICD-10-CM

## 2020-10-26 LAB
ALBUMIN SERPL BCP-MCNC: 3.6 G/DL (ref 3.5–5)
ALP SERPL-CCNC: 94 U/L (ref 46–116)
ALT SERPL W P-5'-P-CCNC: 28 U/L (ref 12–78)
ANION GAP SERPL CALCULATED.3IONS-SCNC: 4 MMOL/L (ref 4–13)
AST SERPL W P-5'-P-CCNC: 14 U/L (ref 5–45)
BILIRUB SERPL-MCNC: 0.6 MG/DL (ref 0.2–1)
BUN SERPL-MCNC: 26 MG/DL (ref 5–25)
CALCIUM SERPL-MCNC: 9.2 MG/DL (ref 8.3–10.1)
CHLORIDE SERPL-SCNC: 108 MMOL/L (ref 100–108)
CO2 SERPL-SCNC: 27 MMOL/L (ref 21–32)
CREAT SERPL-MCNC: 1.29 MG/DL (ref 0.6–1.3)
ERYTHROCYTE [DISTWIDTH] IN BLOOD BY AUTOMATED COUNT: 14 % (ref 11.6–15.1)
GFR SERPL CREATININE-BSD FRML MDRD: 37 ML/MIN/1.73SQ M
GLUCOSE SERPL-MCNC: 110 MG/DL (ref 65–140)
HCT VFR BLD AUTO: 41.9 % (ref 34.8–46.1)
HGB BLD-MCNC: 13.5 G/DL (ref 11.5–15.4)
MCH RBC QN AUTO: 30 PG (ref 26.8–34.3)
MCHC RBC AUTO-ENTMCNC: 32.2 G/DL (ref 31.4–37.4)
MCV RBC AUTO: 93 FL (ref 82–98)
PLATELET # BLD AUTO: 344 THOUSANDS/UL (ref 149–390)
PMV BLD AUTO: 11.2 FL (ref 8.9–12.7)
POTASSIUM SERPL-SCNC: 4.3 MMOL/L (ref 3.5–5.3)
PROT SERPL-MCNC: 7.2 G/DL (ref 6.4–8.2)
RBC # BLD AUTO: 4.5 MILLION/UL (ref 3.81–5.12)
SODIUM SERPL-SCNC: 139 MMOL/L (ref 136–145)
TSH SERPL DL<=0.05 MIU/L-ACNC: 1.86 UIU/ML (ref 0.36–3.74)
WBC # BLD AUTO: 7.44 THOUSAND/UL (ref 4.31–10.16)

## 2020-10-26 PROCEDURE — 90662 IIV NO PRSV INCREASED AG IM: CPT | Performed by: FAMILY MEDICINE

## 2020-10-26 PROCEDURE — 85027 COMPLETE CBC AUTOMATED: CPT

## 2020-10-26 PROCEDURE — 99214 OFFICE O/P EST MOD 30 MIN: CPT | Performed by: FAMILY MEDICINE

## 2020-10-26 PROCEDURE — G0008 ADMIN INFLUENZA VIRUS VAC: HCPCS | Performed by: FAMILY MEDICINE

## 2020-10-26 PROCEDURE — 84443 ASSAY THYROID STIM HORMONE: CPT

## 2020-10-26 PROCEDURE — 80053 COMPREHEN METABOLIC PANEL: CPT

## 2020-10-27 DIAGNOSIS — J45.40 MODERATE PERSISTENT ASTHMA WITHOUT COMPLICATION: ICD-10-CM

## 2020-10-27 RX ORDER — BECLOMETHASONE DIPROPIONATE HFA 80 UG/1
AEROSOL, METERED RESPIRATORY (INHALATION)
Qty: 11 G | Refills: 0 | Status: SHIPPED | OUTPATIENT
Start: 2020-10-27 | End: 2020-11-30

## 2020-10-31 ENCOUNTER — HOSPITAL ENCOUNTER (OUTPATIENT)
Dept: RADIOLOGY | Facility: HOSPITAL | Age: 85
Discharge: HOME | End: 2020-10-31
Attending: FAMILY MEDICINE
Payer: MEDICARE

## 2020-10-31 DIAGNOSIS — R14.0 ABDOMINAL BLOATING: ICD-10-CM

## 2020-10-31 DIAGNOSIS — R19.8 BOWEL MOVEMENT SYMPTOM: ICD-10-CM

## 2020-10-31 PROCEDURE — G1004 CDSM NDSC: HCPCS

## 2020-10-31 PROCEDURE — 74177 CT ABD & PELVIS W/CONTRAST: CPT

## 2020-10-31 RX ADMIN — IOHEXOL 100 ML: 350 INJECTION, SOLUTION INTRAVENOUS at 09:22

## 2020-11-05 ENCOUNTER — TELEPHONE (OUTPATIENT)
Dept: FAMILY MEDICINE CLINIC | Facility: CLINIC | Age: 85
End: 2020-11-05

## 2020-11-06 DIAGNOSIS — R93.5 ABNORMAL CT OF THE ABDOMEN: Primary | ICD-10-CM

## 2020-11-18 DIAGNOSIS — I10 ESSENTIAL HYPERTENSION: ICD-10-CM

## 2020-11-18 RX ORDER — METOPROLOL SUCCINATE 50 MG/1
TABLET, EXTENDED RELEASE ORAL
Qty: 90 TABLET | Refills: 1 | Status: SHIPPED | OUTPATIENT
Start: 2020-11-18 | End: 2021-05-20

## 2020-11-20 ENCOUNTER — OFFICE VISIT (OUTPATIENT)
Dept: GASTROENTEROLOGY | Facility: CLINIC | Age: 85
End: 2020-11-20
Payer: MEDICARE

## 2020-11-20 VITALS — WEIGHT: 139.2 LBS | BODY MASS INDEX: 27.33 KG/M2 | HEIGHT: 60 IN

## 2020-11-20 DIAGNOSIS — R93.5 ABNORMAL CT OF THE ABDOMEN: ICD-10-CM

## 2020-11-20 DIAGNOSIS — R10.13 EPIGASTRIC PAIN: Primary | ICD-10-CM

## 2020-11-20 DIAGNOSIS — R14.0 ABDOMINAL BLOATING: ICD-10-CM

## 2020-11-20 PROCEDURE — 99204 OFFICE O/P NEW MOD 45 MIN: CPT | Performed by: INTERNAL MEDICINE

## 2020-11-20 RX ORDER — FAMOTIDINE 40 MG/1
40 TABLET, FILM COATED ORAL DAILY
Qty: 30 TABLET | Refills: 3 | Status: SHIPPED | OUTPATIENT
Start: 2020-11-20 | End: 2020-11-23 | Stop reason: SDUPTHER

## 2020-11-23 ENCOUNTER — TELEPHONE (OUTPATIENT)
Dept: GASTROENTEROLOGY | Facility: CLINIC | Age: 85
End: 2020-11-23

## 2020-11-23 DIAGNOSIS — R10.13 EPIGASTRIC PAIN: ICD-10-CM

## 2020-11-23 RX ORDER — FAMOTIDINE 40 MG/1
40 TABLET, FILM COATED ORAL DAILY
Qty: 30 TABLET | Refills: 3 | Status: SHIPPED | OUTPATIENT
Start: 2020-11-23 | End: 2021-12-07

## 2020-11-29 DIAGNOSIS — J45.40 MODERATE PERSISTENT ASTHMA WITHOUT COMPLICATION: ICD-10-CM

## 2020-11-30 RX ORDER — BECLOMETHASONE DIPROPIONATE HFA 80 UG/1
AEROSOL, METERED RESPIRATORY (INHALATION)
Qty: 11 G | Refills: 3 | Status: SHIPPED | OUTPATIENT
Start: 2020-11-30 | End: 2021-04-05

## 2020-12-10 ENCOUNTER — TRANSCRIBE ORDERS (OUTPATIENT)
Dept: ADMINISTRATIVE | Facility: HOSPITAL | Age: 85
End: 2020-12-10

## 2020-12-10 ENCOUNTER — HOSPITAL ENCOUNTER (OUTPATIENT)
Dept: RADIOLOGY | Facility: HOSPITAL | Age: 85
Discharge: HOME | End: 2020-12-10
Attending: INTERNAL MEDICINE
Payer: MEDICARE

## 2020-12-10 ENCOUNTER — APPOINTMENT (OUTPATIENT)
Dept: LAB | Facility: HOSPITAL | Age: 85
End: 2020-12-10
Attending: INTERNAL MEDICINE
Payer: MEDICARE

## 2020-12-10 DIAGNOSIS — R93.5 ABNORMAL CT OF THE ABDOMEN: ICD-10-CM

## 2020-12-10 LAB
CRP SERPL QL: 1.5 MG/L
ERYTHROCYTE [SEDIMENTATION RATE] IN BLOOD: 18 MM/HOUR (ref 0–29)

## 2020-12-10 PROCEDURE — 85652 RBC SED RATE AUTOMATED: CPT

## 2020-12-10 PROCEDURE — 36415 COLL VENOUS BLD VENIPUNCTURE: CPT

## 2020-12-10 PROCEDURE — 74250 X-RAY XM SM INT 1CNTRST STD: CPT

## 2020-12-10 PROCEDURE — 86140 C-REACTIVE PROTEIN: CPT

## 2020-12-11 ENCOUNTER — TELEPHONE (OUTPATIENT)
Dept: GASTROENTEROLOGY | Facility: AMBULARY SURGERY CENTER | Age: 85
End: 2020-12-11

## 2020-12-11 NOTE — TELEPHONE ENCOUNTER
----- Message from Maureen Mera MD sent at 12/10/2020  1:10 PM EST -----  Please inform the patient that the inflammatory markers are completely normal

## 2020-12-23 DIAGNOSIS — E78.2 MIXED HYPERLIPIDEMIA: ICD-10-CM

## 2020-12-23 RX ORDER — ROSUVASTATIN CALCIUM 5 MG/1
TABLET, COATED ORAL
Qty: 30 TABLET | Refills: 6 | Status: ON HOLD | OUTPATIENT
Start: 2020-12-23 | End: 2021-06-11 | Stop reason: ALTCHOICE

## 2021-01-04 ENCOUNTER — OFFICE VISIT (OUTPATIENT)
Dept: FAMILY MEDICINE CLINIC | Facility: CLINIC | Age: 86
End: 2021-01-04
Payer: MEDICARE

## 2021-01-04 ENCOUNTER — TELEPHONE (OUTPATIENT)
Dept: FAMILY MEDICINE CLINIC | Facility: CLINIC | Age: 86
End: 2021-01-04

## 2021-01-04 VITALS
HEIGHT: 60 IN | HEART RATE: 72 BPM | RESPIRATION RATE: 14 BRPM | DIASTOLIC BLOOD PRESSURE: 70 MMHG | BODY MASS INDEX: 26.11 KG/M2 | WEIGHT: 133 LBS | TEMPERATURE: 98.7 F | SYSTOLIC BLOOD PRESSURE: 140 MMHG

## 2021-01-04 DIAGNOSIS — M79.89 PAIN AND SWELLING OF TOE, RIGHT: Primary | ICD-10-CM

## 2021-01-04 DIAGNOSIS — M79.674 PAIN AND SWELLING OF TOE, RIGHT: Primary | ICD-10-CM

## 2021-01-04 PROCEDURE — 99214 OFFICE O/P EST MOD 30 MIN: CPT | Performed by: FAMILY MEDICINE

## 2021-01-04 RX ORDER — SULFAMETHOXAZOLE AND TRIMETHOPRIM 800; 160 MG/1; MG/1
1 TABLET ORAL 2 TIMES DAILY
Qty: 14 TABLET | Refills: 0 | Status: SHIPPED | OUTPATIENT
Start: 2021-01-04 | End: 2021-01-11

## 2021-01-04 NOTE — TELEPHONE ENCOUNTER
Spoke with Dr Bull Joel office and they speak with Dr Shawn Agarwal and then follow up with the patient  Left message with patient advising of the same and call our office if there is no contact within the next day

## 2021-01-04 NOTE — PROGRESS NOTES
Chief Complaint   Patient presents with    Toe Pain        Patient ID: Mark Longoria is a 80 y o  female  HPI  Pt is seeing for painful swelling in R second toe started 4 days ago and worsening  -  No drainage, no injury reported -  No therapy tried     The following portions of the patient's history were reviewed and updated as appropriate: allergies, current medications, past family history, past medical history, past social history, past surgical history and problem list     Review of Systems   All other systems reviewed and are negative  Current Outpatient Medications   Medication Sig Dispense Refill    albuterol (PROAIR HFA) 90 mcg/act inhaler Inhale 2 puffs every 6 (six) hours as needed for wheezing 1 Inhaler 2    amLODIPine (NORVASC) 5 mg tablet Take 1 tablet by mouth twice daily 60 tablet 6    cycloSPORINE (RESTASIS) 0 05 % ophthalmic emulsion Administer 1 drop to both eyes 2 (two) times a day      famotidine (PEPCID) 40 MG tablet Take 1 tablet (40 mg total) by mouth daily 30 tablet 3    fenofibrate (TRICOR) 145 mg tablet TAKE 1 TABLET BY MOUTH ONCE DAILY WITH FOOD 30 tablet 11    levothyroxine 50 mcg tablet Take 1 tablet by mouth once daily 90 tablet 1    metoprolol succinate (TOPROL-XL) 50 mg 24 hr tablet TAKE 1 TABLET BY MOUTH ONCE DAILY AT BEDTIME 90 tablet 1    Qvar RediHaler 80 MCG/ACT inhaler INHALE 2 PUFFS BY MOUTH TWICE DAILY RINSE MOUTH AFTER USE 11 g 3    rosuvastatin (CRESTOR) 5 mg tablet TAKE 1 TABLET BY MOUTH EVERY OTHER DAY 30 tablet 6    telmisartan-hydrochlorothiazide (MICARDIS HCT) 80-12 5 MG per tablet Take 1 tablet by mouth once daily 30 tablet 6     No current facility-administered medications for this visit  Objective:    /70   Pulse 72   Temp 98 7 °F (37 1 °C) (Tympanic)   Resp 14   Ht 5' (1 524 m)   Wt 60 3 kg (133 lb)   BMI 25 97 kg/m²        Physical Exam  Constitutional:       Appearance: She is not ill-appearing     Musculoskeletal: Feet:    Neurological:      Mental Status: She is alert  Labs in chart were reviewed  Assessment/Plan:         Diagnoses and all orders for this visit:    Pain and swelling of toe, right  -     Ambulatory referral to Podiatry; Future  -     sulfamethoxazole-trimethoprim (BACTRIM DS) 800-160 mg per tablet;  Take 1 tablet by mouth 2 (two) times a day for 7 days          rto prevelyn Mann MD

## 2021-01-04 NOTE — TELEPHONE ENCOUNTER
Dr Mikael Oquendo    Patient says neither of 2 doctors in Dr Vidal Melara office have any availability until 1/11 and she cannot wait that long as pain is unbearable  Who do you recommend?

## 2021-01-05 ENCOUNTER — OFFICE VISIT (OUTPATIENT)
Dept: PODIATRY | Facility: CLINIC | Age: 86
End: 2021-01-05
Payer: MEDICARE

## 2021-01-05 ENCOUNTER — LAB REQUISITION (OUTPATIENT)
Dept: LAB | Facility: HOSPITAL | Age: 86
End: 2021-01-05
Payer: MEDICARE

## 2021-01-05 VITALS
HEIGHT: 60 IN | WEIGHT: 133 LBS | RESPIRATION RATE: 15 BRPM | SYSTOLIC BLOOD PRESSURE: 143 MMHG | BODY MASS INDEX: 26.11 KG/M2 | DIASTOLIC BLOOD PRESSURE: 94 MMHG

## 2021-01-05 DIAGNOSIS — L03.031 CELLULITIS OF TOE OF RIGHT FOOT: ICD-10-CM

## 2021-01-05 DIAGNOSIS — M79.671 RIGHT FOOT PAIN: ICD-10-CM

## 2021-01-05 DIAGNOSIS — L02.611 CUTANEOUS ABSCESS OF RIGHT FOOT: ICD-10-CM

## 2021-01-05 DIAGNOSIS — M25.571 ARTHRALGIA OF RIGHT FOOT: ICD-10-CM

## 2021-01-05 DIAGNOSIS — M1A.9XX1 TOPHACEOUS GOUT OF JOINT: Primary | ICD-10-CM

## 2021-01-05 DIAGNOSIS — L02.611 ABSCESS OF TOE, RIGHT: ICD-10-CM

## 2021-01-05 PROCEDURE — 87205 SMEAR GRAM STAIN: CPT | Performed by: PODIATRIST

## 2021-01-05 PROCEDURE — 87070 CULTURE OTHR SPECIMN AEROBIC: CPT | Performed by: PODIATRIST

## 2021-01-05 PROCEDURE — 10061 I&D ABSCESS COMP/MULTIPLE: CPT | Performed by: PODIATRIST

## 2021-01-05 PROCEDURE — 99203 OFFICE O/P NEW LOW 30 MIN: CPT | Performed by: PODIATRIST

## 2021-01-05 PROCEDURE — 73620 X-RAY EXAM OF FOOT: CPT | Performed by: PODIATRIST

## 2021-01-05 RX ORDER — COLCHICINE 0.6 MG/1
0.6 TABLET ORAL DAILY
Qty: 3 TABLET | Refills: 0 | Status: SHIPPED | OUTPATIENT
Start: 2021-01-05 | End: 2021-01-12 | Stop reason: SDUPTHER

## 2021-01-05 NOTE — PROGRESS NOTES
Assessment/Plan:  Abscess and cellulitis right 2nd toe secondary to tophaceous gout  Pain upon ambulation  Plan  Patient advised on condition  X-rays taken  Digital nerve block performed  3 cc 2% lidocaine used to anesthetize 2nd right digit  Incision and drainage of abscess of DIPJ done  Culture and sensitivity done  Patient remain on antibiotic  She will take short course of colchicine in addition she will follow up with myself in Rheumatology         Diagnoses and all orders for this visit:    Tophaceous gout of joint  -     colchicine (COLCRYS) 0 6 mg tablet; Take 1 tablet (0 6 mg total) by mouth daily for 3 days  -     Ambulatory referral to Rheumatology; Future    Cellulitis of toe of right foot    Right foot pain    Arthralgia of right foot    Abscess of toe, right          Subjective:  Patient is seen on referral for painful 2nd right toe  It has been this way for 1 week  No history of trauma  Of note patient is on a high-protein diet  Allergies   Allergen Reactions    Atorvastatin      Other reaction(s): Leg Cramps  Reaction Date: [id number];     Azithromycin      Other reaction(s): Unknown Allergic Reaction  Reaction Date: [id number]; Annotation - [id number]: jjw    Codeine Nausea Only     Reaction Date: [id number]; Annotation - [id number]: jjw    Moxifloxacin      Other reaction(s): Diarrhea    Penicillins Rash     Reaction Date: [id number];  Annotation - [id number]: jw         Current Outpatient Medications:     albuterol (PROAIR HFA) 90 mcg/act inhaler, Inhale 2 puffs every 6 (six) hours as needed for wheezing, Disp: 1 Inhaler, Rfl: 2    amLODIPine (NORVASC) 5 mg tablet, Take 1 tablet by mouth twice daily, Disp: 60 tablet, Rfl: 6    colchicine (COLCRYS) 0 6 mg tablet, Take 1 tablet (0 6 mg total) by mouth daily for 3 days, Disp: 3 tablet, Rfl: 0    cycloSPORINE (RESTASIS) 0 05 % ophthalmic emulsion, Administer 1 drop to both eyes 2 (two) times a day, Disp: , Rfl:     famotidine (PEPCID) 40 MG tablet, Take 1 tablet (40 mg total) by mouth daily, Disp: 30 tablet, Rfl: 3    fenofibrate (TRICOR) 145 mg tablet, TAKE 1 TABLET BY MOUTH ONCE DAILY WITH FOOD, Disp: 30 tablet, Rfl: 11    levothyroxine 50 mcg tablet, Take 1 tablet by mouth once daily, Disp: 90 tablet, Rfl: 1    metoprolol succinate (TOPROL-XL) 50 mg 24 hr tablet, TAKE 1 TABLET BY MOUTH ONCE DAILY AT BEDTIME, Disp: 90 tablet, Rfl: 1    Qvar RediHaler 80 MCG/ACT inhaler, INHALE 2 PUFFS BY MOUTH TWICE DAILY RINSE MOUTH AFTER USE, Disp: 11 g, Rfl: 3    rosuvastatin (CRESTOR) 5 mg tablet, TAKE 1 TABLET BY MOUTH EVERY OTHER DAY, Disp: 30 tablet, Rfl: 6    sulfamethoxazole-trimethoprim (BACTRIM DS) 800-160 mg per tablet, Take 1 tablet by mouth 2 (two) times a day for 7 days, Disp: 14 tablet, Rfl: 0    telmisartan-hydrochlorothiazide (MICARDIS HCT) 80-12 5 MG per tablet, Take 1 tablet by mouth once daily, Disp: 30 tablet, Rfl: 6    Patient Active Problem List   Diagnosis    Asthma    Benign essential hypertension    Chronic kidney disease, stage II (mild)    Mixed hyperlipidemia    Osteoporosis    Subclinical hypothyroidism    Abnormal CT of the abdomen    Epigastric pain    Abdominal bloating          Patient ID: Marianna Wiseman is a 80 y o  female  HPI    The following portions of the patient's history were reviewed and updated as appropriate:     family history includes Aneurysm in her sister; Cancer in her daughter; Heart disease in her brother, brother, brother, daughter, father, and sister; Kidney disease in her sister  reports that she has never smoked  She has never used smokeless tobacco  She reports that she does not drink alcohol or use drugs  Vitals:    01/05/21 1120   BP: 143/94   Resp: 15       Review of Systems      Objective:  Patient's shoes and socks removed     Foot Exam    General  General Appearance: appears stated age and healthy   Orientation: alert and oriented to person, place, and time Affect: appropriate   Gait: antalgic       Right Foot/Ankle     Inspection and Palpation  Ecchymosis: none  Tenderness: bony tenderness   Swelling: dorsum   Arch: pes cavus  Claw Toes: second toe  Hallux valgus: no  Hallux limitus: yes  Skin Exam: callus and dry skin;     Neurovascular  Dorsalis pedis: 3+  Posterior tibial: 3+  Saphenous nerve sensation: normal  Tibial nerve sensation: normal  Superficial peroneal nerve sensation: normal  Deep peroneal nerve sensation: normal  Sural nerve sensation: normal      Left Foot/Ankle      Inspection and Palpation  Ecchymosis: none  Swelling: dorsum   Arch: pes planus  Hallux valgus: no  Hallux limitus: yes  Skin Exam: callus and dry skin;     Neurovascular  Dorsalis pedis: 3+  Posterior tibial: 3+  Saphenous nerve sensation: normal  Tibial nerve sensation: normal  Superficial peroneal nerve sensation: normal  Deep peroneal nerve sensation: normal  Sural nerve sensation: normal        Physical Exam  Vitals signs and nursing note reviewed  Constitutional:       Appearance: Normal appearance  Cardiovascular:      Pulses:           Dorsalis pedis pulses are 3+ on the right side and 3+ on the left side  Posterior tibial pulses are 3+ on the right side and 3+ on the left side  Musculoskeletal:      Right foot: Bony tenderness present  No bunion  Left foot: No bunion  Comments: X-ray of right foot demonstrates no evidence of fracture osteomyelitis  However middle phalanx of 2nd right toe demonstrates cystic change consistent with interosseous tophaceous change  Feet:      Right foot:      Skin integrity: Callus and dry skin present  Left foot:      Skin integrity: Callus and dry skin present  Skin:     General: Skin is warm  Comments: DIPJ of 2nd right toe demonstrates significant edema and erythema  There is digital cellulitis  There is pointing noted the DIPJ    Incision and drainage done to reveals serosanguineous exudate as well as tophaceous gouty material    Neurological:      Mental Status: She is alert  Psychiatric:         Mood and Affect: Mood normal          Behavior: Behavior normal          Thought Content:  Thought content normal          Judgment: Judgment normal

## 2021-01-06 ENCOUNTER — OFFICE VISIT (OUTPATIENT)
Dept: GASTROENTEROLOGY | Facility: CLINIC | Age: 86
End: 2021-01-06
Payer: MEDICARE

## 2021-01-06 VITALS
HEIGHT: 60 IN | WEIGHT: 133.6 LBS | BODY MASS INDEX: 26.23 KG/M2 | DIASTOLIC BLOOD PRESSURE: 60 MMHG | SYSTOLIC BLOOD PRESSURE: 128 MMHG | TEMPERATURE: 96.8 F

## 2021-01-06 DIAGNOSIS — R19.4 CHANGE IN BOWEL HABITS: Primary | ICD-10-CM

## 2021-01-06 DIAGNOSIS — R10.13 EPIGASTRIC PAIN: ICD-10-CM

## 2021-01-06 DIAGNOSIS — R93.3 ABNORMAL CT SCAN, SMALL BOWEL: ICD-10-CM

## 2021-01-06 PROCEDURE — 99213 OFFICE O/P EST LOW 20 MIN: CPT | Performed by: PHYSICIAN ASSISTANT

## 2021-01-06 NOTE — PROGRESS NOTES
Assessment and Plan    #1  Change in bowel habits with small bowel inflammation: much improved on FODMAP diet and stopping NSAIDs  No diarrhea  Inflammatory markers normal  Small bowel follow through was not consistent with Crohn's   -continue FODMAP diet  Also provided gout diet due to recent issue with gout in toe    -advised she can reintroduce one food at a time to see if she has any symptoms  -no indication for colonoscopy since symptoms improved    #2  Epigastric pain: resolved once on new diet and stopping NSAID  -call with any resolution of pain  -continue to avoid NSAIDs  -no indication for EGD since symptoms improved  Follow up or call as needed   --------------------------------------------------------------------------------------------------------------------    Chief Complaint: f/u change in bowel habits/epigastric pain    HPI: Keenan Fermin is a 80 y o  female with a history of osteoporosis, hypothyroidism, HTN, CKD, and gout who presents today for follow up  Patient was seen in November due to change in bowel habits with loose stools and epigastric pain  She had been using Aleve daily at that time for back pain  She had some distal small bowel inflammation on imaging  Patient was hesitant to have procedures done last visit  She started FODMAP diet and stopped all NSAID use  She reports significant improvement in her symptoms  Epigastric pain is resolved  Her bowels are normal without any diarrhea  Patient denies any nausea, vomiting,  dysphagia, unexpected weight loss, constipation, blood in stool, or black tarry stools  She does report new diagnosis of gout yesterday and ws placed on abx by podiatrist due to infection as well       Review of Systems:   General: negative for fatigue, fever, night sweats or unexpected weight loss  Psychological: negative for anxiety or depression  Ophthalmic: negative for blurry vision or scleral icterus  ENT: negative for headaches, oral lesions, sore throat, vocal changes or dysphagia  Hematological and Lymphatic: negative for pallor or swollen lymph nodes  Respiratory: negative for cough, shortness of breath or wheezing  Cardiovascular: negative for chest pain, edema or murmur  Gastrointestinal: as mentioned in HPI  Genito-Urinary: negative for dysuria or incontinence  Musculoskeletal: negative for joint pain, joint stiffness or joint swelling; foot pain  Dermatological: negative for pruritus, rash, or jaundice    Current Medications  Current Outpatient Medications   Medication Sig Dispense Refill    albuterol (PROAIR HFA) 90 mcg/act inhaler Inhale 2 puffs every 6 (six) hours as needed for wheezing 1 Inhaler 2    amLODIPine (NORVASC) 5 mg tablet Take 1 tablet by mouth twice daily 60 tablet 6    colchicine (COLCRYS) 0 6 mg tablet Take 1 tablet (0 6 mg total) by mouth daily for 3 days 3 tablet 0    cycloSPORINE (RESTASIS) 0 05 % ophthalmic emulsion Administer 1 drop to both eyes 2 (two) times a day      famotidine (PEPCID) 40 MG tablet Take 1 tablet (40 mg total) by mouth daily 30 tablet 3    fenofibrate (TRICOR) 145 mg tablet TAKE 1 TABLET BY MOUTH ONCE DAILY WITH FOOD 30 tablet 11    levothyroxine 50 mcg tablet Take 1 tablet by mouth once daily 90 tablet 1    metoprolol succinate (TOPROL-XL) 50 mg 24 hr tablet TAKE 1 TABLET BY MOUTH ONCE DAILY AT BEDTIME 90 tablet 1    Qvar RediHaler 80 MCG/ACT inhaler INHALE 2 PUFFS BY MOUTH TWICE DAILY RINSE MOUTH AFTER USE 11 g 3    rosuvastatin (CRESTOR) 5 mg tablet TAKE 1 TABLET BY MOUTH EVERY OTHER DAY 30 tablet 6    sulfamethoxazole-trimethoprim (BACTRIM DS) 800-160 mg per tablet Take 1 tablet by mouth 2 (two) times a day for 7 days 14 tablet 0    telmisartan-hydrochlorothiazide (MICARDIS HCT) 80-12 5 MG per tablet Take 1 tablet by mouth once daily 30 tablet 6     No current facility-administered medications for this visit          Past Medical History  Past Medical History:   Diagnosis Date    Anesthesia groggy, difficult to awaken    Asthma     Chronic kidney disease     stage 2 mild    Chronic pain disorder     lumbar herniated discs    Dry eyes     Hyperlipidemia     Hypertension        Past Surgical History  Past Surgical History:   Procedure Laterality Date    APPENDECTOMY      CATARACT EXTRACTION Bilateral     COLONOSCOPY N/A 10/25/2017    Procedure: COLONOSCOPY;  Surgeon: Bonnie Palomo MD;  Location: [place] [address] [facility]; Service: Gastroenterology    JOINT REPLACEMENT Bilateral     partial knee       Past Social History   Social History     Socioeconomic History    Marital status:       Spouse name: None    Number of children: None    Years of education: None    Highest education level: None   Occupational History    None   Social Needs    Financial resource strain: None    Food insecurity     Worry: None     Inability: None    Transportation needs     Medical: None     Non-medical: None   Tobacco Use    Smoking status: Never Smoker    Smokeless tobacco: Never Used   Substance and Sexual Activity    Alcohol use: No    Drug use: No    Sexual activity: None   Lifestyle    Physical activity     Days per week: None     Minutes per session: None    Stress: None   Relationships    Social connections     Talks on phone: None     Gets together: None     Attends Spiritism service: None     Active member of club or organization: None     Attends meetings of clubs or organizations: None     Relationship status: None    Intimate partner violence     Fear of current or ex partner: None     Emotionally abused: None     Physically abused: None     Forced sexual activity: None   Other Topics Concern    None   Social History Narrative    None       The following portions of the patient's history were reviewed and updated as appropriate: allergies, current medications, past family history, past medical history, past social history, past surgical history and problem list     Vital Signs  Vitals: 01/06/21 1103   BP: 128/60   BP Location: Right arm   Patient Position: Sitting   Cuff Size: Standard   Temp: (!) 96 8 °F (36 °C)   TempSrc: Temporal   Weight: 60 6 kg (133 lb 9 6 oz)   Height: 5' (1 524 m)       Physical Exam:  General appearance: alert, cooperative, no distress  HEENT: normocephalic, anicteric, no eye erythema or discharge, no oropharyngeal thrush  Neck: supple, trachea midline, no adenopathy  Lungs: CTA b/l, no rales, rhonchi, or wheezing, unlabored respirations  Heart: RRR, no murmur, rubs, or gallops  Abdomen: soft, non-tender, non-distended, normal bowel sounds, no masses or organomegaly  Rectal: deferred  Extremities: no cyanosis, clubbing, or edema  Musculoskeletal: normal gait  Skin: color and texture normal, no jaundice, no rashes or lesions  Psychiatric: alert and oriented, normal affect and behavior

## 2021-01-06 NOTE — PATIENT INSTRUCTIONS
Low Purine Diet   WHAT YOU NEED TO KNOW:   A low-purine diet is a meal plan based on foods that are low in purine content  Purine is a substance that is found in foods and is produced naturally by the body  Purines are broken down by the body and changed to uric acid  The kidneys normally filter the uric acid, and it leaves the body through the urine  However, people with gout sometimes have a buildup of uric acid in the blood  This buildup of uric acid can cause swelling and pain (a gout attack)  A low-purine diet may help to treat and prevent gout attacks  DISCHARGE INSTRUCTIONS:   Foods to include: The following foods are low in purine  · Eggs, nuts, and peanut butter    · Low-fat and fat-free cheese and ice cream    · Skim or 1% milk    · Soup made without meat extract or broth    · Vegetables that are not on the medium-purine list below    · All fruit and fruit juice    · Bread, pasta, rice, cakes, cornbread, and popcorn    · Water, soda, tea, coffee, and cocoa    · Sugar, sweets, and gelatin    · Fat and oil    Foods to limit:   · Medium-purine foods:     ? Meats:  Limit the following to 4 to 6 ounces each day  ? Meat and poultry     ? Crab, lobster, oysters, and shrimp    ? Vegetables:  Limit the following vegetables to ½ cup each day  ? Asparagus    ? Cauliflower    ? Spinach    ? Mushrooms    ? Green peas    ? Beans, peas, and lentils (limit to 1 cup each day)    ? Oats and oatmeal (limit to ? cup uncooked each day)    ? Wheat germ and bran (limit to ¼ cup each day)    · High-purine foods:  Limit or avoid foods high in purine  ? Anchovies, sardines, scallops, and mussels    ? Tuna, codfish, herring, and kate    ? [employer], like goose and duck    ? Organ meats, such as brains, heart, kidney, liver, and sweetbreads    ? Gravies and sauces made with meat    ? Yeast extracts taken in the form of a supplement    Other guidelines to follow:   · Increase liquid intake    Drink 8 to 16 (eight-ounce) cups of liquid each day  At least half of the liquid you drink should be water  Liquid can help your body get rid of extra uric acid  · Limit or avoid alcohol  Alcohol (especially beer) increases your risk of a gout attack  Beer contains a high amount of purine  · Maintain a healthy weight  If you are overweight, you should lose weight slowly  Weight loss can help decrease the amount of stress on your joints  Regular exercise can help you lose weight if you are overweight, or maintain your weight if you are at a normal weight  Talk to your healthcare provider before you begin an exercise program     © Copyright [employer]183 Information is for End User's use only and may not be sold, redistributed or otherwise used for commercial purposes  All illustrations and images included in CareNotes® are the copyrighted property of A [employer] A M [employer] Inc  or Markus Moreno  The above information is an  only  It is not intended as medical advice for individual conditions or treatments  Talk to your doctor, nurse or pharmacist before following any medical regimen to see if it is safe and effective for you

## 2021-01-08 LAB
BACTERIA WND AEROBE CULT: ABNORMAL
GRAM STN SPEC: ABNORMAL

## 2021-01-12 ENCOUNTER — OFFICE VISIT (OUTPATIENT)
Dept: PODIATRY | Facility: CLINIC | Age: 86
End: 2021-01-12
Payer: MEDICARE

## 2021-01-12 VITALS
HEIGHT: 60 IN | SYSTOLIC BLOOD PRESSURE: 130 MMHG | WEIGHT: 133 LBS | DIASTOLIC BLOOD PRESSURE: 69 MMHG | BODY MASS INDEX: 26.11 KG/M2 | RESPIRATION RATE: 16 BRPM

## 2021-01-12 DIAGNOSIS — M79.671 RIGHT FOOT PAIN: ICD-10-CM

## 2021-01-12 DIAGNOSIS — L03.031 CELLULITIS OF TOE OF RIGHT FOOT: Primary | ICD-10-CM

## 2021-01-12 DIAGNOSIS — M25.571 ARTHRALGIA OF RIGHT FOOT: ICD-10-CM

## 2021-01-12 DIAGNOSIS — M1A.9XX1 TOPHACEOUS GOUT OF JOINT: ICD-10-CM

## 2021-01-12 PROCEDURE — 99213 OFFICE O/P EST LOW 20 MIN: CPT | Performed by: PODIATRIST

## 2021-01-12 RX ORDER — COLCHICINE 0.6 MG/1
0.6 TABLET ORAL DAILY
Qty: 3 TABLET | Refills: 0 | Status: SHIPPED | OUTPATIENT
Start: 2021-01-12 | End: 2021-02-12

## 2021-01-26 ENCOUNTER — OFFICE VISIT (OUTPATIENT)
Dept: PODIATRY | Facility: CLINIC | Age: 86
End: 2021-01-26
Payer: MEDICARE

## 2021-01-26 VITALS
RESPIRATION RATE: 17 BRPM | BODY MASS INDEX: 26.11 KG/M2 | SYSTOLIC BLOOD PRESSURE: 136 MMHG | HEIGHT: 60 IN | HEART RATE: 77 BPM | WEIGHT: 133 LBS | DIASTOLIC BLOOD PRESSURE: 85 MMHG

## 2021-01-26 DIAGNOSIS — M1A.9XX1 TOPHACEOUS GOUT OF JOINT: ICD-10-CM

## 2021-01-26 DIAGNOSIS — M25.571 ARTHRALGIA OF RIGHT FOOT: Primary | ICD-10-CM

## 2021-01-26 DIAGNOSIS — M86.271 SUBACUTE OSTEOMYELITIS OF RIGHT FOOT (HCC): ICD-10-CM

## 2021-01-26 PROCEDURE — 99213 OFFICE O/P EST LOW 20 MIN: CPT | Performed by: PODIATRIST

## 2021-01-26 PROCEDURE — 73620 X-RAY EXAM OF FOOT: CPT | Performed by: PODIATRIST

## 2021-01-26 NOTE — PROGRESS NOTES
Assessment/Plan:  Tophaceous gout 2nd right toe  Resolved cellulitis 2nd right toe  Arthralgia right toe  Rule out osteomyelitis  Plan  Foot exam performed  Patient educated on condition  X-rays taken  Patient will follow-up in 2 weeks for x-ray to rule out any bone changes consistent with arthritis and osteomyelitis  She will watch for signs of infection  She is to follow-up with rheumatology  Diagnoses and all orders for this visit:    Arthralgia of right foot    Tophaceous gout of joint    Subacute osteomyelitis of right foot ([address] )          Subjective:  Patient is doing better  She took medication as prescribed  She has appointment to see Rheumatology as directed  No history of fever or night sweats    Allergies   Allergen Reactions    Atorvastatin      Other reaction(s): Leg Cramps  Reaction Date: [id number];     Azithromycin      Other reaction(s): Unknown Allergic Reaction  Reaction Date: [id number]; Annotation - [id number]: jdeisyw    Codeine Nausea Only     Reaction Date: [id number]; Annotation - [id number]: jdeisyw    Moxifloxacin      Other reaction(s): Diarrhea    Penicillins Rash     Reaction Date: [id number];  Annotation - [id number]: [email or web address]         Current Outpatient Medications:     albuterol (PROAIR HFA) 90 mcg/act inhaler, Inhale 2 puffs every 6 (six) hours as needed for wheezing, Disp: 1 Inhaler, Rfl: 2    amLODIPine (NORVASC) 5 mg tablet, Take 1 tablet by mouth twice daily, Disp: 60 tablet, Rfl: 6    colchicine (COLCRYS) 0 6 mg tablet, Take 1 tablet (0 6 mg total) by mouth daily for 3 days, Disp: 3 tablet, Rfl: 0    cycloSPORINE (RESTASIS) 0 05 % ophthalmic emulsion, Administer 1 drop to both eyes 2 (two) times a day, Disp: , Rfl:     famotidine (PEPCID) 40 MG tablet, Take 1 tablet (40 mg total) by mouth daily, Disp: 30 tablet, Rfl: 3    fenofibrate (TRICOR) 145 mg tablet, TAKE 1 TABLET BY MOUTH ONCE DAILY WITH FOOD, Disp: 30 tablet, Rfl: 11    levothyroxine 50 mcg tablet, Take 1 tablet by mouth once daily, Disp: 90 tablet, Rfl: 1    metoprolol succinate (TOPROL-XL) 50 mg 24 hr tablet, TAKE 1 TABLET BY MOUTH ONCE DAILY AT BEDTIME, Disp: 90 tablet, Rfl: 1    Qvar RediHaler 80 MCG/ACT inhaler, INHALE 2 PUFFS BY MOUTH TWICE DAILY RINSE MOUTH AFTER USE, Disp: 11 g, Rfl: 3    rosuvastatin (CRESTOR) 5 mg tablet, TAKE 1 TABLET BY MOUTH EVERY OTHER DAY, Disp: 30 tablet, Rfl: 6    telmisartan-hydrochlorothiazide (MICARDIS HCT) 80-12 5 MG per tablet, Take 1 tablet by mouth once daily, Disp: 30 tablet, Rfl: 6    Patient Active Problem List   Diagnosis    Asthma    Benign essential hypertension    Chronic kidney disease, stage II (mild)    Mixed hyperlipidemia    Osteoporosis    Subclinical hypothyroidism    Abnormal CT of the abdomen    Epigastric pain    Abdominal bloating          Patient ID: Mark Longoria is a 80 y o  female  HPI    The following portions of the patient's history were reviewed and updated as appropriate:     family history includes Aneurysm in her sister; Cancer in her daughter; Heart disease in her brother, brother, brother, daughter, father, and sister; Kidney disease in her sister  reports that she has never smoked  She has never used smokeless tobacco  She reports that she does not drink alcohol or use drugs  Vitals:    01/26/21 0904   BP: 136/85   Pulse: 77   Resp: 17       Review of Systems      Objective:  Patient's shoes and socks removed     Foot ExamPhysical Exam      General  General Appearance: appears stated age and healthy   Orientation: alert and oriented to person, place, and time   Affect: appropriate   Gait: antalgic         Right Foot/Ankle      Inspection and Palpation  Ecchymosis: none  Tenderness: bony tenderness   Swelling: dorsum   Arch: pes cavus  Claw Toes: second toe  Hallux valgus: no  Hallux limitus: yes  Skin Exam: callus and dry skin;      Neurovascular  Dorsalis pedis: 3+  Posterior tibial: 3+  Saphenous nerve sensation: normal  Tibial nerve sensation: normal  Superficial peroneal nerve sensation: normal  Deep peroneal nerve sensation: normal  Sural nerve sensation: normal        Left Foot/Ankle       Inspection and Palpation  Ecchymosis: none  Swelling: dorsum   Arch: pes planus  Hallux valgus: no  Hallux limitus: yes  Skin Exam: callus and dry skin;      Neurovascular  Dorsalis pedis: 3+  Posterior tibial: 3+  Saphenous nerve sensation: normal  Tibial nerve sensation: normal  Superficial peroneal nerve sensation: normal  Deep peroneal nerve sensation: normal  Sural nerve sensation: normal           Physical Exam  Vitals signs and nursing note reviewed  Constitutional:       Appearance: Normal appearance  Cardiovascular:      Pulses:           Dorsalis pedis pulses are 3+ on the right side and 3+ on the left side         Posterior tibial pulses are 3+ on the right side and 3+ on the left side  Musculoskeletal:      Right foot: Bony tenderness present  No bunion       Left foot: No bunion       Comments: X-ray of right foot demonstrates no evidence of fracture osteomyelitis   However middle phalanx of 2nd right toe demonstrates cystic change consistent with interosseous tophaceous change    Feet:      Right foot:      Skin integrity: Callus and dry skin present       Left foot:      Skin integrity: Callus and dry skin present  Skin:     General: Skin is warm       Comments: DIPJ of 2nd right toe demonstrates decrease in edema erythema  No evidence of ascending cellulitis  No ulcer noted    Negative abscess

## 2021-02-08 DIAGNOSIS — E03.8 SUBCLINICAL HYPOTHYROIDISM: ICD-10-CM

## 2021-02-08 RX ORDER — LEVOTHYROXINE SODIUM 0.05 MG/1
TABLET ORAL
Qty: 90 TABLET | Refills: 1 | Status: SHIPPED | OUTPATIENT
Start: 2021-02-08 | End: 2021-08-09

## 2021-02-11 NOTE — PROGRESS NOTES
Assessment and Plan:   Ms Marie James is an 68-year-old female with history significant for recently diagnosed gout affecting her right foot 2nd digit and chronic kidney disease stage 3, who presents for further evaluation of the gout  She is referred by Dr Bambi Rodriguez for a rheumatology consult  Abril Lopez presents today for further evaluation of an acute onset of pain and swelling affecting her right foot 2nd digit in early January which was diagnosed as gout by Podiatry and managed with colchicine with benefit noted  She has not had any recurrence of the symptoms and denies any other acutely inflamed joints  Given the underlying chronic kidney disease as well as presence of what appears to be a tophaceous deposit, her presentation is likely consistent with gout  She mentions an x-ray was done without significant concerns but I do not have the reports available to me  As this has occurred in an isolated manner and she is currently asymptomatic, I will proceed by obtaining a uric acid level  If this is elevated we will discuss starting urate lowering therapy versus a wait and watch approach to see if her symptoms are recurrent  I did provide her with additional reading information on maintaining a low purine diet  - Her blood pressure was significantly elevated today even on repeat check  She mentions that she discontinued one of her antihypertensives a few weeks ago  She is currently not describing any complaints such as headaches, chest pain or shortness of breath, but we advised her to contact her primary care physician immediately after today's appointment to discuss the hypertension     - I requested she let me know if her symptoms are recurrent otherwise I will see her back for a follow-up in 6 months  Plan:  Diagnoses and all orders for this visit:    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, unspecified cause  -     Uric acid;  Future    Tophaceous gout of joint  -     Ambulatory referral to Rheumatology    Stage 3b chronic kidney disease    Osteopenia, unspecified location    Other orders  -     Cancel: RF Screen w/ Reflex to Titer; Future  -     Cancel: Cyclic citrul peptide antibody, IgG; Future  -     Cancel: XR foot 3+ vw right; Future      I have personally reviewed pertinent films in PACS of the CT abdomen which does not show any osseous abnormalities  Activities as tolerated  Continue other medications as prescribed by PCP and other specialists  RTC in 6 months  HPI  Ms Paula Bond is an 80-year-old female with history significant for recently diagnosed gout affecting her right foot 2nd digit and chronic kidney disease stage 3, who presents for further evaluation of the gout  She is referred by Dr Lainey Gonzalez for a rheumatology consult  She reports in early January she had an acute onset of pain and swelling along with redness affecting her right foot 2nd toe  She was seen by her primary care physician initially and then referred to Podiatry  There were concerns for an infection versus gout  She was not started on antibiotics  She was given colchicine for 3 days which she states helped  She was not prescribed steroids or allopurinol at any time  She reports since then the symptoms affecting that digit have not recurred  She denies a prior history of gout or having any acutely painful or swollen joints  She does experience chronic pain affecting her back and bilateral knees secondary to known osteoarthritis  No other joint issues today  She reports just prior to this she had been dealing with diarrhea and was following with Gastroenterology  She was advised dietary modifications and thinks that this may have contributed to the gout flare  She keeps herself well hydrated and denies drinking alcohol  She was consuming red meat frequently but since the gout flare has discontinued this  She does not eat shellfish      The following portions of the patient's history were reviewed and updated as appropriate: allergies, current medications, past family history, past medical history, past social history, past surgical history and problem list       Review of Systems  Constitutional: Negative for weight change, fevers, chills, night sweats, fatigue  ENT/Mouth: Negative for hearing changes, ear pain, nasal congestion, sinus pain, hoarseness, sore throat, rhinorrhea, swallowing difficulty  Eyes: Negative for pain, redness, discharge, vision changes  Cardiovascular: Negative for chest pain, SOB, palpitations  Respiratory: Negative for cough, sputum, wheezing, dyspnea  Gastrointestinal: Negative for nausea, vomiting, diarrhea, constipation, pain, heartburn  Genitourinary: Negative for dysuria, urinary frequency, hematuria  Musculoskeletal: As per HPI  Skin: Negative for skin rash, color changes  Neuro: Negative for weakness, numbness, tingling, loss of consciousness  Psych: Negative for anxiety, depression  Heme/Lymph: Negative for easy bruising, bleeding, lymphadenopathy  Past Medical History:   Diagnosis Date    Anesthesia     groggy, difficult to awaken    Asthma     Chronic kidney disease     stage 2 mild    Chronic pain disorder     lumbar herniated discs    Dry eyes     Hyperlipidemia     Hypertension        Past Surgical History:   Procedure Laterality Date    APPENDECTOMY      CATARACT EXTRACTION Bilateral     COLONOSCOPY N/A 10/25/2017    Procedure: COLONOSCOPY;  Surgeon: Jefry Godinez MD;  Location: [place] [facility] [address] [facility]; Service: Gastroenterology    JOINT REPLACEMENT Bilateral     partial knee       Social History     Socioeconomic History    Marital status:       Spouse name: Not on file    Number of children: Not on file    Years of education: Not on file    Highest education level: Not on file   Occupational History    Not on file   Social Needs    Financial resource strain: Not on file    Food insecurity Worry: Not on file     Inability: Not on file    Transportation needs     Medical: Not on file     Non-medical: Not on file   Tobacco Use    Smoking status: Never Smoker    Smokeless tobacco: Never Used   Substance and Sexual Activity    Alcohol use: No    Drug use: No    Sexual activity: Not on file   Lifestyle    Physical activity     Days per week: Not on file     Minutes per session: Not on file    Stress: Not on file   Relationships    Social connections     Talks on phone: Not on file     Gets together: Not on file     Attends [employer] service: Not on file     Active member of club or organization: Not on file     Attends meetings of clubs or organizations: Not on file     Relationship status: Not on file    Intimate partner violence     Fear of current or ex partner: Not on file     Emotionally abused: Not on file     Physically abused: Not on file     Forced sexual activity: Not on file   Other Topics Concern    Not on file   Social History Narrative    Not on file       Family History   Problem Relation Age of Onset    Heart disease Father     Aneurysm Sister     Heart disease Brother     Cancer Daughter         kidney     Heart disease Brother     Heart disease Brother     Heart disease Sister         MI    Kidney disease Sister     Heart disease Daughter        Allergies   Allergen Reactions    Atorvastatin      Other reaction(s): Leg Cramps  Reaction Date: 15Nov2010;     Azithromycin      Other reaction(s): Unknown Allergic Reaction  Reaction Date: 07Jan2004; Annotation - [id number]: ham    Codeine Nausea Only     Reaction Date: [id number]; Annotation - [id number]: ham    Moxifloxacin      Other reaction(s): Diarrhea    Penicillins Rash     Reaction Date: [id number];  Annotation - [id number]: ham       Current Outpatient Medications:     albuterol (PROAIR HFA) 90 mcg/act inhaler, Inhale 2 puffs every 6 (six) hours as needed for wheezing, Disp: 1 Inhaler, Rfl: 2    amLODIPine (NORVASC) 5 mg tablet, Take 1 tablet by mouth twice daily, Disp: 60 tablet, Rfl: 6    cycloSPORINE (RESTASIS) 0 05 % ophthalmic emulsion, Administer 1 drop to both eyes 2 (two) times a day, Disp: , Rfl:     famotidine (PEPCID) 40 MG tablet, Take 1 tablet (40 mg total) by mouth daily, Disp: 30 tablet, Rfl: 3    fenofibrate (TRICOR) 145 mg tablet, TAKE 1 TABLET BY MOUTH ONCE DAILY WITH FOOD, Disp: 30 tablet, Rfl: 11    levothyroxine 50 mcg tablet, Take 1 tablet by mouth once daily, Disp: 90 tablet, Rfl: 1    metoprolol succinate (TOPROL-XL) 50 mg 24 hr tablet, TAKE 1 TABLET BY MOUTH ONCE DAILY AT BEDTIME, Disp: 90 tablet, Rfl: 1    Qvar RediHaler 80 MCG/ACT inhaler, INHALE 2 PUFFS BY MOUTH TWICE DAILY RINSE MOUTH AFTER USE, Disp: 11 g, Rfl: 3    rosuvastatin (CRESTOR) 5 mg tablet, TAKE 1 TABLET BY MOUTH EVERY OTHER DAY, Disp: 30 tablet, Rfl: 6    telmisartan-hydrochlorothiazide (MICARDIS HCT) 80-12 5 MG per tablet, Take 1 tablet by mouth once daily, Disp: 30 tablet, Rfl: 6      Objective:    Vitals:    02/12/21 0813   BP: (!) 209/78   BP Location: Left arm   Patient Position: Sitting   Cuff Size: Adult   Pulse: 59   Temp: (!) 97 4 °F (36 3 °C)       Physical Exam  General: Well appearing, well nourished, in no distress  Oriented x 3, normal mood and affect  Ambulating without difficulty  Skin: Good turgor, no rash, unusual bruising or prominent lesions  Hair: Normal texture and distribution  Nails: Normal color, no deformities  HEENT:  Head: Normocephalic, atraumatic  Eyes: Conjunctiva clear, sclera non-icteric, EOM intact  Extremities: No amputations or deformities, cyanosis, edema  Musculoskeletal:   Right foot - There is a dusky discoloration present to her right foot 2nd digit without tenderness  There is mild soft tissue swelling noted  There appears to be a single tophaceous deposit  Neurologic: Alert and oriented  No focal neurological deficits appreciated     Psychiatric: Normal mood and affect  GEOVANI [place]    Rheumatology

## 2021-02-12 ENCOUNTER — OFFICE VISIT (OUTPATIENT)
Dept: RHEUMATOLOGY | Facility: CLINIC | Age: 86
End: 2021-02-12
Payer: MEDICARE

## 2021-02-12 ENCOUNTER — LAB (OUTPATIENT)
Dept: LAB | Facility: CLINIC | Age: 86
End: 2021-02-12
Payer: MEDICARE

## 2021-02-12 ENCOUNTER — TELEPHONE (OUTPATIENT)
Dept: RHEUMATOLOGY | Facility: CLINIC | Age: 86
End: 2021-02-12

## 2021-02-12 VITALS — TEMPERATURE: 97.4 F | SYSTOLIC BLOOD PRESSURE: 209 MMHG | HEART RATE: 59 BPM | DIASTOLIC BLOOD PRESSURE: 78 MMHG

## 2021-02-12 DIAGNOSIS — N18.32 STAGE 3B CHRONIC KIDNEY DISEASE (HCC): ICD-10-CM

## 2021-02-12 DIAGNOSIS — M85.80 OSTEOPENIA, UNSPECIFIED LOCATION: ICD-10-CM

## 2021-02-12 DIAGNOSIS — M1A.9XX1 TOPHACEOUS GOUT OF JOINT: ICD-10-CM

## 2021-02-12 DIAGNOSIS — M1A.9XX1 CHRONIC GOUT INVOLVING TOE OF RIGHT FOOT WITH TOPHUS, UNSPECIFIED CAUSE: Primary | ICD-10-CM

## 2021-02-12 DIAGNOSIS — M1A.9XX1 CHRONIC GOUT INVOLVING TOE OF RIGHT FOOT WITH TOPHUS, UNSPECIFIED CAUSE: ICD-10-CM

## 2021-02-12 LAB — URATE SERPL-MCNC: 9.7 MG/DL (ref 2–6.8)

## 2021-02-12 PROCEDURE — 36415 COLL VENOUS BLD VENIPUNCTURE: CPT

## 2021-02-12 PROCEDURE — 84550 ASSAY OF BLOOD/URIC ACID: CPT

## 2021-02-12 PROCEDURE — 99205 OFFICE O/P NEW HI 60 MIN: CPT | Performed by: INTERNAL MEDICINE

## 2021-02-12 NOTE — PATIENT INSTRUCTIONS
Low Purine Diet   WHAT YOU NEED TO KNOW:   A low-purine diet is a meal plan based on foods that are low in purine content  Purine is a substance that is found in foods and is produced naturally by the body  Purines are broken down by the body and changed to uric acid  The kidneys normally filter the uric acid, and it leaves the body through the urine  However, people with gout sometimes have a buildup of uric acid in the blood  This buildup of uric acid can cause swelling and pain (a gout attack)  A low-purine diet may help to treat and prevent gout attacks  DISCHARGE INSTRUCTIONS:   Foods to include: The following foods are low in purine  · Eggs, nuts, and peanut butter    · Low-fat and fat free cheese and ice cream    · Skim or 1% milk    · Soup made without meat extract or broth    · Vegetables that are not on the medium-purine list below    · All fruit and fruit juice    · Bread, pasta, rice, cake, cornbread, and popcorn    · Water, soda, tea, coffee, and cocoa    · Sugar, sweets, and gelatin    · Fat and oil    Foods to limit:   · Medium-purine foods:      ? Meats:  Limit the following to 4 to 6 ounces each day  ? Meat and poultry     ? Crab, lobster, oysters, and shrimp    ? Vegetables:  Limit the following vegetables to ½ cup each day  ? Asparagus    ? Cauliflower    ? Spinach    ? Mushrooms    ? Green peas    ? Beans, peas, and lentils (limit to 1 cup each day)    ? Oats and oatmeal (limit to ? cup uncooked each day)    ? Wheat germ and bran (limit to ¼ cup each day)    · High-purine foods:  Limit or avoid foods high in purine  ? Anchovies, sardines, scallops, and mussels    ? Tuna, codfish, herring, and kate    ? [employer], like goose and duck    ? Organ meats, such as brains, heart, kidney, liver, and sweetbreads    ? Gravies and sauces made with meat    ? Yeast extracts taken in the form of a supplement    Other guidelines to follow:   · Increase liquid intake    Drink 8 to 16 (eight-ounce) cups of liquid each day  At least half of the liquid you drink should be water  Liquid can help your body get rid of extra uric acid  · Limit or avoid alcohol  Alcohol (especially beer) increases your risk of a gout attack  Beer contains a high amount of purine  · Maintain a healthy weight  If you are overweight, you should lose weight slowly  Weight loss can help decrease the amount of stress on your joints  Regular exercise can help you lose weight if you are overweight, or maintain your weight if you are at a normal weight  Talk to your healthcare provider before you begin an exercise program     © Copyright [employer] Information is for End User's use only and may not be sold, redistributed or otherwise used for commercial purposes  All illustrations and images included in CareNotes® are the copyrighted property of A D A M , Inc  or Markus Moreno  The above information is an  only  It is not intended as medical advice for individual conditions or treatments  Talk to your doctor, nurse or pharmacist before following any medical regimen to see if it is safe and effective for you

## 2021-02-12 NOTE — TELEPHONE ENCOUNTER
----- Message from Venice Recinos MD sent at 2/12/2021  2:24 PM EST -----  Please let her know the uric acid levels were slightly elevated but I will hold off on starting medications for now  If she has another episode with acute onset of joint pain or swelling please ask her to call us back and we will start the medication at that time, thanks  She should also focus on diet control as we discussed today

## 2021-02-16 ENCOUNTER — OFFICE VISIT (OUTPATIENT)
Dept: PODIATRY | Facility: CLINIC | Age: 86
End: 2021-02-16
Payer: MEDICARE

## 2021-02-16 VITALS
HEIGHT: 60 IN | BODY MASS INDEX: 26.11 KG/M2 | HEART RATE: 67 BPM | RESPIRATION RATE: 17 BRPM | DIASTOLIC BLOOD PRESSURE: 91 MMHG | SYSTOLIC BLOOD PRESSURE: 179 MMHG | WEIGHT: 133 LBS

## 2021-02-16 DIAGNOSIS — M25.571 CHRONIC PAIN OF RIGHT ANKLE: ICD-10-CM

## 2021-02-16 DIAGNOSIS — M86.271 SUBACUTE OSTEOMYELITIS OF RIGHT FOOT (HCC): ICD-10-CM

## 2021-02-16 DIAGNOSIS — M1A.9XX1 TOPHACEOUS GOUT OF JOINT: Primary | ICD-10-CM

## 2021-02-16 DIAGNOSIS — G89.29 CHRONIC PAIN OF RIGHT ANKLE: ICD-10-CM

## 2021-02-16 PROCEDURE — 73620 X-RAY EXAM OF FOOT: CPT | Performed by: PODIATRIST

## 2021-02-16 PROCEDURE — 99212 OFFICE O/P EST SF 10 MIN: CPT | Performed by: PODIATRIST

## 2021-02-16 PROCEDURE — 20605 DRAIN/INJ JOINT/BURSA W/O US: CPT | Performed by: PODIATRIST

## 2021-02-16 NOTE — PROGRESS NOTES
Assessment/Plan:  Tophaceous gout 2nd right toe  Resolved cellulitis 2nd right toe  Arthralgia right toe  Rule out osteomyelitis  Arthralgia right ankle     Plan  Foot exam performed  Patient educated on condition  X-rays taken  Patient will follow-up in 2 weeks for x-ray to rule out any bone changes consistent with arthritis and osteomyelitis  She will watch for signs of infection  She is to follow-up with rheumatology  today, 1 25 cc of Kenalog 10 injected into right ankle without pain or complication            Diagnoses and all orders for this visit:     Arthralgia of right foot     Tophaceous gout of joint     Subacute osteomyelitis of right foot (HCC)            Subjective:  Patient is doing better  She took medication as prescribed  She has appointment to see Rheumatology as directed  No history of fever or night sweats           Allergies   Allergen Reactions    Atorvastatin         Other reaction(s): Leg Cramps  Reaction Date: [id number];     Azithromycin         Other reaction(s): Unknown Allergic Reaction  Reaction Date: [id number]; Annotation - [id number]: Remedios Alston Codeine Nausea Only       Reaction Date: [id number]; Annotation - [id number]: jdeisyw    Moxifloxacin         Other reaction(s): Diarrhea    Penicillins Rash       Reaction Date: [id number];  Annotation - [id number]: jjw            Current Outpatient Medications:     albuterol (PROAIR HFA) 90 mcg/act inhaler, Inhale 2 puffs every 6 (six) hours as needed for wheezing, Disp: 1 Inhaler, Rfl: 2    amLODIPine (NORVASC) 5 mg tablet, Take 1 tablet by mouth twice daily, Disp: 60 tablet, Rfl: 6    colchicine (COLCRYS) 0 6 mg tablet, Take 1 tablet (0 6 mg total) by mouth daily for 3 days, Disp: 3 tablet, Rfl: 0    cycloSPORINE (RESTASIS) 0 05 % ophthalmic emulsion, Administer 1 drop to both eyes 2 (two) times a day, Disp: , Rfl:     famotidine (PEPCID) 40 MG tablet, Take 1 tablet (40 mg total) by mouth daily, Disp: 30 tablet, Rfl: 3   fenofibrate (TRICOR) 145 mg tablet, TAKE 1 TABLET BY MOUTH ONCE DAILY WITH FOOD, Disp: 30 tablet, Rfl: 11    levothyroxine 50 mcg tablet, Take 1 tablet by mouth once daily, Disp: 90 tablet, Rfl: 1    metoprolol succinate (TOPROL-XL) 50 mg 24 hr tablet, TAKE 1 TABLET BY MOUTH ONCE DAILY AT BEDTIME, Disp: 90 tablet, Rfl: 1    Qvar RediHaler 80 MCG/ACT inhaler, INHALE 2 PUFFS BY MOUTH TWICE DAILY RINSE MOUTH AFTER USE, Disp: 11 g, Rfl: 3    rosuvastatin (CRESTOR) 5 mg tablet, TAKE 1 TABLET BY MOUTH EVERY OTHER DAY, Disp: 30 tablet, Rfl: 6    telmisartan-hydrochlorothiazide (MICARDIS HCT) 80-12 5 MG per tablet, Take 1 tablet by mouth once daily, Disp: 30 tablet, Rfl: 6         Patient Active Problem List   Diagnosis    Asthma    Benign essential hypertension    Chronic kidney disease, stage II (mild)    Mixed hyperlipidemia    Osteoporosis    Subclinical hypothyroidism    Abnormal CT of the abdomen    Epigastric pain    Abdominal bloating             Patient ID: Angelica Mclean is a 80 y o  female      HPI     The following portions of the patient's history were reviewed and updated as appropriate:      family history includes Aneurysm in her sister; Cancer in her daughter; Heart disease in her brother, brother, brother, daughter, father, and sister; Kidney disease in her sister        reports that she has never smoked  She has never used smokeless tobacco  She reports that she does not drink alcohol or use drugs          Vitals:     01/26/21 0904   BP: 136/85   Pulse: 77   Resp: 17         Review of Systems       Objective:  Patient's shoes and socks removed     Foot ExamPhysical Exam       General  General Appearance: appears stated age and healthy   Orientation: alert and oriented to person, place, and time   Affect: appropriate   Gait: antalgic         Right Foot/Ankle      Inspection and Palpation  Ecchymosis: none  Tenderness: bony tenderness   Swelling: dorsum   Arch: pes cavus  Claw Toes: second toe  Hallux valgus: no  Hallux limitus: yes  Skin Exam: callus and dry skin;      Neurovascular  Dorsalis pedis: 3+  Posterior tibial: 3+  Saphenous nerve sensation: normal  Tibial nerve sensation: normal  Superficial peroneal nerve sensation: normal  Deep peroneal nerve sensation: normal  Sural nerve sensation: normal        Left Foot/Ankle       Inspection and Palpation  Ecchymosis: none  Swelling: dorsum   Arch: pes planus  Hallux valgus: no  Hallux limitus: yes  Skin Exam: callus and dry skin;      Neurovascular  Dorsalis pedis: 3+  Posterior tibial: 3+  Saphenous nerve sensation: normal  Tibial nerve sensation: normal  Superficial peroneal nerve sensation: normal  Deep peroneal nerve sensation: normal  Sural nerve sensation: normal           Physical Exam  Vitals signs and nursing note reviewed  Constitutional:       Appearance: Normal appearance  Cardiovascular:      Pulses:           Dorsalis pedis pulses are 3+ on the right side and 3+ on the left side         Posterior tibial pulses are 3+ on the right side and 3+ on the left side  Musculoskeletal:      Right foot: Bony tenderness present  No bunion       Left foot: No bunion       Comments: X-ray of right foot demonstrates no evidence of fracture osteomyelitis   However middle phalanx of 2nd right toe demonstrates cystic change consistent with interosseous tophaceous change    Feet:      Right foot:      Skin integrity: Callus and dry skin present       Left foot:      Skin integrity: Callus and dry skin present  Skin:     General: Skin is warm       Comments: DIPJ of 2nd right toe demonstrates decrease in edema erythema   No evidence of ascending cellulitis   No ulcer noted   Negative abscess          There is also pain with palpation in the right ankle joint  There is pain with palpation the sinus tarsi    No evidence of infection or gout

## 2021-04-03 DIAGNOSIS — J45.40 MODERATE PERSISTENT ASTHMA WITHOUT COMPLICATION: ICD-10-CM

## 2021-04-05 RX ORDER — BECLOMETHASONE DIPROPIONATE HFA 80 UG/1
AEROSOL, METERED RESPIRATORY (INHALATION)
Qty: 11 G | Refills: 0 | Status: SHIPPED | OUTPATIENT
Start: 2021-04-05 | End: 2021-05-03

## 2021-04-21 DIAGNOSIS — I10 ESSENTIAL HYPERTENSION: ICD-10-CM

## 2021-04-21 RX ORDER — TELMISARTAN AND HYDROCHLORTHIAZIDE 80; 12.5 MG/1; MG/1
TABLET ORAL
Qty: 90 TABLET | Refills: 1 | Status: SHIPPED | OUTPATIENT
Start: 2021-04-21 | End: 2021-10-14

## 2021-05-03 DIAGNOSIS — J45.40 MODERATE PERSISTENT ASTHMA WITHOUT COMPLICATION: ICD-10-CM

## 2021-05-03 RX ORDER — BECLOMETHASONE DIPROPIONATE HFA 80 UG/1
AEROSOL, METERED RESPIRATORY (INHALATION)
Qty: 11 G | Refills: 0 | Status: SHIPPED | OUTPATIENT
Start: 2021-05-03 | End: 2021-06-08

## 2021-05-07 RX ORDER — COLCHICINE 0.6 MG/1
1 TABLET ORAL DAILY
COMMUNITY
Start: 2021-01-12 | End: 2022-04-15

## 2021-05-11 ENCOUNTER — OFFICE VISIT (OUTPATIENT)
Dept: FAMILY MEDICINE CLINIC | Facility: CLINIC | Age: 86
End: 2021-05-11
Payer: MEDICARE

## 2021-05-11 VITALS
SYSTOLIC BLOOD PRESSURE: 140 MMHG | DIASTOLIC BLOOD PRESSURE: 72 MMHG | WEIGHT: 132 LBS | RESPIRATION RATE: 14 BRPM | BODY MASS INDEX: 26.61 KG/M2 | TEMPERATURE: 98.5 F | HEIGHT: 59 IN | HEART RATE: 72 BPM

## 2021-05-11 DIAGNOSIS — J45.40 MODERATE PERSISTENT ASTHMA WITHOUT COMPLICATION: ICD-10-CM

## 2021-05-11 DIAGNOSIS — I10 BENIGN ESSENTIAL HYPERTENSION: Primary | ICD-10-CM

## 2021-05-11 DIAGNOSIS — R73.01 ELEVATED FASTING GLUCOSE: ICD-10-CM

## 2021-05-11 DIAGNOSIS — D17.1 LIPOMA OF BACK: ICD-10-CM

## 2021-05-11 DIAGNOSIS — E78.2 MIXED HYPERLIPIDEMIA: ICD-10-CM

## 2021-05-11 DIAGNOSIS — M1A.9XX0 CHRONIC GOUT WITHOUT TOPHUS, UNSPECIFIED CAUSE, UNSPECIFIED SITE: ICD-10-CM

## 2021-05-11 DIAGNOSIS — E03.9 HYPOTHYROIDISM, UNSPECIFIED TYPE: ICD-10-CM

## 2021-05-11 DIAGNOSIS — Z00.00 MEDICARE ANNUAL WELLNESS VISIT, SUBSEQUENT: ICD-10-CM

## 2021-05-11 PROCEDURE — G0439 PPPS, SUBSEQ VISIT: HCPCS | Performed by: FAMILY MEDICINE

## 2021-05-11 PROCEDURE — 99214 OFFICE O/P EST MOD 30 MIN: CPT | Performed by: FAMILY MEDICINE

## 2021-05-11 PROCEDURE — 1123F ACP DISCUSS/DSCN MKR DOCD: CPT | Performed by: FAMILY MEDICINE

## 2021-05-11 NOTE — PROGRESS NOTES
Chief Complaint   Patient presents with    Medicare Wellness Visit     AWV    Mass     lump on back    Hypertension    Hypothyroidism        Patient ID: Tunde Marie is a 80 y o  female  HPI  Pt is seeing for f/u HTN, HLD, Hypothyroidism, Gout, Asthma -  All stable -  Increased in size Lipoma on L shoulder     The following portions of the patient's history were reviewed and updated as appropriate: allergies, current medications, past family history, past medical history, past social history, past surgical history and problem list     Review of Systems   Constitutional: Negative  Respiratory: Negative  Cardiovascular: Negative  Gastrointestinal: Negative  Genitourinary: Negative  Musculoskeletal: Negative  Skin: Negative  Neurological: Negative          Current Outpatient Medications   Medication Sig Dispense Refill    albuterol (PROAIR HFA) 90 mcg/act inhaler Inhale 2 puffs every 6 (six) hours as needed for wheezing 1 Inhaler 2    amLODIPine (NORVASC) 5 mg tablet Take 1 tablet by mouth twice daily 60 tablet 6    colchicine (COLCRYS) 0 6 mg tablet Take 1 tablet by mouth daily      cycloSPORINE (RESTASIS) 0 05 % ophthalmic emulsion Administer 1 drop to both eyes 2 (two) times a day      famotidine (PEPCID) 40 MG tablet Take 1 tablet (40 mg total) by mouth daily 30 tablet 3    fenofibrate (TRICOR) 145 mg tablet TAKE 1 TABLET BY MOUTH ONCE DAILY WITH FOOD 30 tablet 11    levothyroxine 50 mcg tablet Take 1 tablet by mouth once daily 90 tablet 1    metoprolol succinate (TOPROL-XL) 50 mg 24 hr tablet TAKE 1 TABLET BY MOUTH ONCE DAILY AT BEDTIME 90 tablet 1    Qvar RediHaler 80 MCG/ACT inhaler INHALE 2 PUFFS BY MOUTH TWICE DAILY RINSE MOUTH AFTER USE 11 g 0    rosuvastatin (CRESTOR) 5 mg tablet TAKE 1 TABLET BY MOUTH EVERY OTHER DAY 30 tablet 6    telmisartan-hydrochlorothiazide (MICARDIS HCT) 80-12 5 MG per tablet Take 1 tablet by mouth once daily 90 tablet 1     No current facility-administered medications for this visit  Objective:    /72 (BP Location: Right arm, Patient Position: Sitting, Cuff Size: Standard)   Pulse 72   Temp 98 5 °F (36 9 °C) (Temporal)   Resp 14   Ht 4' 11" (1 499 m)   Wt 59 9 kg (132 lb)   BMI 26 66 kg/m²        Physical Exam  Constitutional:       Appearance: She is not ill-appearing  Cardiovascular:      Rate and Rhythm: Normal rate and regular rhythm  Heart sounds: Murmur present  Pulmonary:      Effort: Pulmonary effort is normal  No respiratory distress  Breath sounds: No wheezing, rhonchi or rales  Musculoskeletal:      Right lower leg: No edema  Left lower leg: No edema  Skin:         Neurological:      General: No focal deficit present  Mental Status: She is alert and oriented to person, place, and time  Psychiatric:         Mood and Affect: Mood normal          Thought Content: Thought content normal          Judgment: Judgment normal                  Assessment/Plan:         Diagnoses and all orders for this visit:    Benign essential hypertension  -     Comprehensive metabolic panel; Future    Medicare annual wellness visit, subsequent    Hypothyroidism, unspecified type  -     TSH, 3rd generation; Future    Moderate persistent asthma without complication    Mixed hyperlipidemia  -     Lipid panel; Future    Chronic gout without tophus, unspecified cause, unspecified site  -     Uric acid; Future    Elevated fasting glucose  -     Hemoglobin A1C; Future    Lipoma of back  -     Ambulatory referral to General Surgery; Future    Other orders  -     colchicine (COLCRYS) 0 6 mg tablet; Take 1 tablet by mouth daily            BMI Counseling: Body mass index is 26 66 kg/m²  Discussed the patient's BMI with her  The BMI is above normal  Exercise recommendations include exercising 3-5 times per week           rto in 6 m         Sandy Esquivel MD

## 2021-05-11 NOTE — PROGRESS NOTES
Assessment and Plan:     Problem List Items Addressed This Visit        Endocrine    Other specified hypothyroidism       Respiratory    Asthma       Cardiovascular and Mediastinum    Benign essential hypertension - Primary    Relevant Orders    Comprehensive metabolic panel       Other    Mixed hyperlipidemia    Relevant Orders    Lipid panel      Other Visit Diagnoses     Medicare annual wellness visit, subsequent        Chronic gout without tophus, unspecified cause, unspecified site        Relevant Orders    Uric acid    Elevated fasting glucose        Relevant Orders    Hemoglobin A1C    Lipoma of back        Relevant Orders    Ambulatory referral to General Surgery           Preventive health issues were discussed with patient, and age appropriate screening tests were ordered as noted in patient's After Visit Summary  Personalized health advice and appropriate referrals for health education or preventive services given if needed, as noted in patient's After Visit Summary       History of Present Illness:     Patient presents for Medicare Annual Wellness visit    Patient Care Team:  Padmaja Montez MD as PCP - MD Hilda Tyler MD as Endoscopist     Problem List:     Patient Active Problem List   Diagnosis    Asthma    Benign essential hypertension    Chronic kidney disease, stage II (mild)    Mixed hyperlipidemia    Osteoporosis    Other specified hypothyroidism    Abnormal CT of the abdomen    Epigastric pain    Abdominal bloating      Past Medical and Surgical History:     Past Medical History:   Diagnosis Date    Anesthesia     groggy, difficult to awaken    Asthma     Chronic kidney disease     stage 2 mild    Chronic pain disorder     lumbar herniated discs    Dry eyes     Hyperlipidemia     Hypertension      Past Surgical History:   Procedure Laterality Date    APPENDECTOMY      CATARACT EXTRACTION Bilateral     COLONOSCOPY N/A 10/25/2017    Procedure: COLONOSCOPY;  Surgeon: Jayne Damian MD;  Location: [facility]; Service: Gastroenterology    JOINT REPLACEMENT Bilateral     partial knee      Family History:     Family History   Problem Relation Age of Onset    Heart disease Father     Aneurysm Sister     Heart disease Brother     Cancer Daughter         kidney     Heart disease Brother     Heart disease Brother     Heart disease Sister         MI    Kidney disease Sister     Heart disease Daughter       Social History:        Social History     Socioeconomic History    Marital status:       Spouse name: None    Number of children: None    Years of education: None    Highest education level: None   Occupational History    None   Social Needs    Financial resource strain: None    Food insecurity     Worry: None     Inability: None    Transportation needs     Medical: None     Non-medical: None   Tobacco Use    Smoking status: Never Smoker    Smokeless tobacco: Never Used   Substance and Sexual Activity    Alcohol use: No    Drug use: No    Sexual activity: None   Lifestyle    Physical activity     Days per week: None     Minutes per session: None    Stress: None   Relationships    Social connections     Talks on phone: None     Gets together: None     Attends Sikh service: None     Active member of club or organization: None     Attends meetings of clubs or organizations: None     Relationship status: None    Intimate partner violence     Fear of current or ex partner: None     Emotionally abused: None     Physically abused: None     Forced sexual activity: None   Other Topics Concern    None   Social History Narrative    None      Medications and Allergies:     Current Outpatient Medications   Medication Sig Dispense Refill    albuterol (PROAIR HFA) 90 mcg/act inhaler Inhale 2 puffs every 6 (six) hours as needed for wheezing 1 Inhaler 2    amLODIPine (NORVASC) 5 mg tablet Take 1 tablet by mouth twice daily 60 tablet 6  colchicine (COLCRYS) 0 6 mg tablet Take 1 tablet by mouth daily      cycloSPORINE (RESTASIS) 0 05 % ophthalmic emulsion Administer 1 drop to both eyes 2 (two) times a day      famotidine (PEPCID) 40 MG tablet Take 1 tablet (40 mg total) by mouth daily 30 tablet 3    fenofibrate (TRICOR) 145 mg tablet TAKE 1 TABLET BY MOUTH ONCE DAILY WITH FOOD 30 tablet 11    levothyroxine 50 mcg tablet Take 1 tablet by mouth once daily 90 tablet 1    metoprolol succinate (TOPROL-XL) 50 mg 24 hr tablet TAKE 1 TABLET BY MOUTH ONCE DAILY AT BEDTIME 90 tablet 1    Qvar RediHaler 80 MCG/ACT inhaler INHALE 2 PUFFS BY MOUTH TWICE DAILY RINSE MOUTH AFTER USE 11 g 0    rosuvastatin (CRESTOR) 5 mg tablet TAKE 1 TABLET BY MOUTH EVERY OTHER DAY 30 tablet 6    telmisartan-hydrochlorothiazide (MICARDIS HCT) 80-12 5 MG per tablet Take 1 tablet by mouth once daily 90 tablet 1     No current facility-administered medications for this visit  Allergies   Allergen Reactions    Atorvastatin      Other reaction(s): Leg Cramps  Reaction Date: [id number];     Azithromycin      Other reaction(s): Unknown Allergic Reaction  Reaction Date: [id number]; Annotation - [id number]: jjw    Codeine Nausea Only     Reaction Date: [id number]; Annotation - [id number]: jjw    Moxifloxacin      Other reaction(s): Diarrhea    Penicillins Rash     Reaction Date: [id number];  Annotation - [id number]: jjw      Immunizations:     Immunization History   Administered Date(s) Administered    Influenza Split High Dose Preservative Free IM 09/23/2014, 01/04/2016    Influenza, high dose seasonal 0 7 mL 10/26/2020    Influenza, injectable, quadrivalent, preservative free 0 5 mL 02/11/2019    Influenza, recombinant, quadrivalent,injectable, preservative free 10/18/2019    Influenza, seasonal, injectable 12/13/2007, 09/25/2008, 11/13/2012    Pneumococcal Conjugate 13-Valent 06/05/2017    Pneumococcal Polysaccharide PPV23 12/13/2007    SARS-CoV-2 / COVID-19 mRNA IM ([employer]) 01/23/2021, 02/13/2021    influenza, injectable, quadrivalent 10/18/2019, 10/26/2020      Health Maintenance: There are no preventive care reminders to display for this patient  There are no preventive care reminders to display for this patient  Medicare Health Risk Assessment:     /72 (BP Location: Right arm, Patient Position: Sitting, Cuff Size: Standard)   Pulse 72   Temp 98 5 °F (36 9 °C) (Temporal)   Resp 14   Ht 4' 11" (1 499 m)   Wt 59 9 kg (132 lb)   BMI 26 66 kg/m²      Erlin Stern is here for her Subsequent Wellness visit  Health Risk Assessment:   Patient rates overall health as good  Patient feels that their physical health rating is slightly worse  Patient is very satisfied with their life  Eyesight was rated as same  Hearing was rated as same  Patient feels that their emotional and mental health rating is same  Patients states they are never, rarely angry  Patient states they are never, rarely unusually tired/fatigued  Pain experienced in the last 7 days has been some  Patient's pain rating has been 5/10  Depression Screening:   PHQ-2 Score: 0      Fall Risk Screening: In the past year, patient has experienced: no history of falling in past year      Urinary Incontinence Screening:   Patient has not leaked urine accidently in the last six months  Home Safety:  Patient does not have trouble with stairs inside or outside of their home  Patient has working smoke alarms and has working carbon monoxide detector  Home safety hazards include: none  Nutrition:   Current diet is Low Cholesterol and No Added Salt  Medications:   Patient is currently taking over-the-counter supplements  OTC medications include: see medication list  Patient is able to manage medications       Activities of Daily Living (ADLs)/Instrumental Activities of Daily Living (IADLs):   Walk and transfer into and out of bed and chair?: Yes  Dress and groom yourself?: Yes    Bathe or shower yourself?: Yes    Feed yourself? Yes  Do your laundry/housekeeping?: Yes  Manage your money, pay your bills and track your expenses?: Yes  Make your own meals?: Yes    Do your own shopping?: Yes    Previous Hospitalizations:   Any hospitalizations or ED visits within the last 12 months?: No      Advance Care Planning:   Living will: No    Durable POA for healthcare: No    Advanced directive: No    Advanced directive counseling given: Yes      Cognitive Screening:   Provider or family/friend/caregiver concerned regarding cognition?: No    PREVENTIVE SCREENINGS      Cardiovascular Screening:    General: Screening Not Indicated and History Lipid Disorder    Due for: Lipid Panel      Diabetes Screening:     General: Screening Current    Due for: Blood Glucose      Colorectal Cancer Screening:     General: Screening Not Indicated      Breast Cancer Screening:     General: Screening Not Indicated      Cervical Cancer Screening:    General: Screening Not Indicated      Osteoporosis Screening:    General: Screening Not Indicated and History Osteoporosis      Abdominal Aortic Aneurysm (AAA) Screening:        General: Screening Not Indicated      Lung Cancer Screening:     General: Screening Not Indicated      Hepatitis C Screening:    General: Screening Not Indicated    Screening, Brief Intervention, and Referral to Treatment (SBIRT)    Screening  Typical number of drinks in a day: 0  Typical number of drinks in a week: 0  Interpretation: Low risk drinking behavior      Single Item Drug Screening:  How often have you used an illegal drug (including marijuana) or a prescription medication for non-medical reasons in the past year? never    Single Item Drug Screen Score: 0  Interpretation: Negative screen for possible drug use disorder      Geraldo Jones MD

## 2021-05-11 NOTE — PATIENT INSTRUCTIONS
Medicare Preventive Visit Patient Instructions  Thank you for completing your Welcome to Medicare Visit or Medicare Annual Wellness Visit today  Your next wellness visit will be due in one year (5/12/2022)  The screening/preventive services that you may require over the next 5-10 years are detailed below  Some tests may not apply to you based off risk factors and/or age  Screening tests ordered at today's visit but not completed yet may show as past due  Also, please note that scanned in results may not display below  Preventive Screenings:  Service Recommendations Previous Testing/Comments   Colorectal Cancer Screening  * Colonoscopy    * Fecal Occult Blood Test (FOBT)/Fecal Immunochemical Test (FIT)  * Fecal DNA/Cologuard Test  * Flexible Sigmoidoscopy Age: 54-65 years old   Colonoscopy: every 10 years (may be performed more frequently if at higher risk)  OR  FOBT/FIT: every 1 year  OR  Cologuard: every 3 years  OR  Sigmoidoscopy: every 5 years  Screening may be recommended earlier than age 48 if at higher risk for colorectal cancer  Also, an individualized decision between you and your healthcare provider will decide whether screening between the ages of 74-80 would be appropriate  Colonoscopy: Not on file  FOBT/FIT: Not on file  Cologuard: Not on file  Sigmoidoscopy: Not on file          Breast Cancer Screening Age: 36 years old  Frequency: every 1-2 years  Not required if history of left and right mastectomy Mammogram: 02/26/2019        Cervical Cancer Screening Between the ages of 21-29, pap smear recommended once every 3 years  Between the ages of 33-67, can perform pap smear with HPV co-testing every 5 years     Recommendations may differ for women with a history of total hysterectomy, cervical cancer, or abnormal pap smears in past  Pap Smear: Not on file        Hepatitis C Screening Once for adults born between [facility]  More frequently in patients at high risk for Hepatitis C Hep C Antibody: Not on file        Diabetes Screening 1-2 times per year if you're at risk for diabetes or have pre-diabetes Fasting glucose: 107 mg/dL   A1C: No results in last 5 years        Cholesterol Screening Once every 5 years if you don't have a lipid disorder  May order more often based on risk factors  Lipid panel: 01/21/2020          Other Preventive Screenings Covered by Medicare:  1  Abdominal Aortic Aneurysm (AAA) Screening: covered once if your at risk  You're considered to be at risk if you have a family history of AAA  2  Lung Cancer Screening: covers low dose CT scan once per year if you meet all of the following conditions: (1) Age 50-69; (2) No signs or symptoms of lung cancer; (3) Current smoker or have quit smoking within the last 15 years; (4) You have a tobacco smoking history of at least 30 pack years (packs per day multiplied by number of years you smoked); (5) You get a written order from a healthcare provider  3  Glaucoma Screening: covered annually if you're considered high risk: (1) You have diabetes OR (2) Family history of glaucoma OR (3)  aged 48 and older OR (3)  American aged 72 and older  3  Osteoporosis Screening: covered every 2 years if you meet one of the following conditions: (1) You're estrogen deficient and at risk for osteoporosis based off medical history and other findings; (2) Have a vertebral abnormality; (3) On glucocorticoid therapy for more than 3 months; (4) Have primary hyperparathyroidism; (5) On osteoporosis medications and need to assess response to drug therapy  · Last bone density test (DXA Scan): 02/26/2019   5  HIV Screening: covered annually if you're between the age of 15-65  Also covered annually if you are younger than 13 and older than 72 with risk factors for HIV infection  For pregnant patients, it is covered up to 3 times per pregnancy      Immunizations:  Immunization Recommendations   Influenza Vaccine Annual influenza vaccination during flu season is recommended for all persons aged >= 6 months who do not have contraindications   Pneumococcal Vaccine (Prevnar and Pneumovax)  * Prevnar = PCV13  * Pneumovax = PPSV23   Adults 25-60 years old: 1-3 doses may be recommended based on certain risk factors  Adults 72 years old: Prevnar (PCV13) vaccine recommended followed by Pneumovax (PPSV23) vaccine  If already received PPSV23 since turning 65, then PCV13 recommended at least one year after PPSV23 dose  Hepatitis B Vaccine 3 dose series if at intermediate or high risk (ex: diabetes, end stage renal disease, liver disease)   Tetanus (Td) Vaccine - COST NOT COVERED BY MEDICARE PART B Following completion of primary series, a booster dose should be given every 10 years to maintain immunity against tetanus  Td may also be given as tetanus wound prophylaxis  Tdap Vaccine - COST NOT COVERED BY MEDICARE PART B Recommended at least once for all adults  For pregnant patients, recommended with each pregnancy  Shingles Vaccine (Shingrix) - COST NOT COVERED BY MEDICARE PART B  2 shot series recommended in those aged 48 and above     Health Maintenance Due:  There are no preventive care reminders to display for this patient  Immunizations Due:  There are no preventive care reminders to display for this patient  Advance Directives   What are advance directives? Advance directives are legal documents that state your wishes and plans for medical care  These plans are made ahead of time in case you lose your ability to make decisions for yourself  Advance directives can apply to any medical decision, such as the treatments you want, and if you want to donate organs  What are the types of advance directives? There are many types of advance directives, and each state has rules about how to use them  You may choose a combination of any of the following:  · Living will: This is a written record of the treatment you want   You can also choose which treatments you do not want, which to limit, and which to stop at a certain time  This includes surgery, medicine, IV fluid, and tube feedings  · Durable power of  for healthcare [facility]): This is a written record that states who you want to make healthcare choices for you when you are unable to make them for yourself  This person, called a proxy, is usually a family member or a friend  You may choose more than 1 proxy  · Do not resuscitate (DNR) order:  A DNR order is used in case your heart stops beating or you stop breathing  It is a request not to have certain forms of treatment, such as CPR  A DNR order may be included in other types of advance directives  · Medical directive: This covers the care that you want if you are in a coma, near death, or unable to make decisions for yourself  You can list the treatments you want for each condition  Treatment may include pain medicine, surgery, blood transfusions, dialysis, IV or tube feedings, and a ventilator (breathing machine)  · Values history: This document has questions about your views, beliefs, and how you feel and think about life  This information can help others choose the care that you would choose  Why are advance directives important? An advance directive helps you control your care  Although spoken wishes may be used, it is better to have your wishes written down  Spoken wishes can be misunderstood, or not followed  Treatments may be given even if you do not want them  An advance directive may make it easier for your family to make difficult choices about your care  Weight Management   Why it is important to manage your weight:  Being overweight increases your risk of health conditions such as heart disease, high blood pressure, type 2 diabetes, and certain types of cancer  It can also increase your risk for osteoarthritis, sleep apnea, and other respiratory problems  Aim for a slow, steady weight loss   Even a small amount of weight loss can lower your risk of health problems  How to lose weight safely:  A safe and healthy way to lose weight is to eat fewer calories and get regular exercise  You can lose up about 1 pound a week by decreasing the number of calories you eat by 500 calories each day  Healthy meal plan for weight management:  A healthy meal plan includes a variety of foods, contains fewer calories, and helps you stay healthy  A healthy meal plan includes the following:  · Eat whole-grain foods more often  A healthy meal plan should contain fiber  Fiber is the part of grains, fruits, and vegetables that is not broken down by your body  Whole-grain foods are healthy and provide extra fiber in your diet  Some examples of whole-grain foods are whole-wheat breads and pastas, oatmeal, brown rice, and bulgur  · Eat a variety of vegetables every day  Include dark, leafy greens such as spinach, kale, tammy greens, and mustard greens  Eat yellow and orange vegetables such as carrots, sweet potatoes, and winter squash  · Eat a variety of fruits every day  Choose fresh or canned fruit (canned in its own juice or light syrup) instead of juice  Fruit juice has very little or no fiber  · Eat low-fat dairy foods  Drink fat-free (skim) milk or 1% milk  Eat fat-free yogurt and low-fat cottage cheese  Try low-fat cheeses such as mozzarella and other reduced-fat cheeses  · Choose meat and other protein foods that are low in fat  Choose beans or other legumes such as split peas or lentils  Choose fish, skinless poultry (chicken or turkey), or lean cuts of red meat (beef or pork)  Before you cook meat or poultry, cut off any visible fat  · Use less fat and oil  Try baking foods instead of frying them  Add less fat, such as margarine, sour cream, regular salad dressing and mayonnaise to foods  Eat fewer high-fat foods  Some examples of high-fat foods include french fries, doughnuts, ice cream, and cakes  · Eat fewer sweets    Limit foods and drinks that are high in sugar  This includes candy, cookies, regular soda, and sweetened drinks  Exercise:  Exercise at least 30 minutes per day on most days of the week  Some examples of exercise include walking, biking, dancing, and swimming  You can also fit in more physical activity by taking the stairs instead of the elevator or parking farther away from stores  Ask your healthcare provider about the best exercise plan for you  © Copyright [address] 2018 Information is for End User's use only and may not be sold, redistributed or otherwise used for commercial purposes   All illustrations and images included in CareNotes® are the copyrighted property of [employer]  or [address] [place] [address]

## 2021-05-12 ENCOUNTER — TELEPHONE (OUTPATIENT)
Dept: FAMILY MEDICINE CLINIC | Facility: CLINIC | Age: 86
End: 2021-05-12

## 2021-05-12 ENCOUNTER — APPOINTMENT (OUTPATIENT)
Dept: LAB | Facility: CLINIC | Age: 86
End: 2021-05-12
Payer: MEDICARE

## 2021-05-12 DIAGNOSIS — E03.9 HYPOTHYROIDISM, UNSPECIFIED TYPE: ICD-10-CM

## 2021-05-12 DIAGNOSIS — M1A.9XX0 CHRONIC GOUT WITHOUT TOPHUS, UNSPECIFIED CAUSE, UNSPECIFIED SITE: ICD-10-CM

## 2021-05-12 DIAGNOSIS — I10 BENIGN ESSENTIAL HYPERTENSION: ICD-10-CM

## 2021-05-12 DIAGNOSIS — E78.2 MIXED HYPERLIPIDEMIA: ICD-10-CM

## 2021-05-12 DIAGNOSIS — R73.01 ELEVATED FASTING GLUCOSE: ICD-10-CM

## 2021-05-12 LAB
ALBUMIN SERPL BCP-MCNC: 3.6 G/DL (ref 3.5–5)
ALP SERPL-CCNC: 90 U/L (ref 46–116)
ALT SERPL W P-5'-P-CCNC: 26 U/L (ref 12–78)
ANION GAP SERPL CALCULATED.3IONS-SCNC: 5 MMOL/L (ref 4–13)
AST SERPL W P-5'-P-CCNC: 18 U/L (ref 5–45)
BILIRUB SERPL-MCNC: 0.49 MG/DL (ref 0.2–1)
BUN SERPL-MCNC: 23 MG/DL (ref 5–25)
CALCIUM SERPL-MCNC: 9.8 MG/DL (ref 8.3–10.1)
CHLORIDE SERPL-SCNC: 109 MMOL/L (ref 100–108)
CHOLEST SERPL-MCNC: 194 MG/DL (ref 50–200)
CO2 SERPL-SCNC: 27 MMOL/L (ref 21–32)
CREAT SERPL-MCNC: 1.1 MG/DL (ref 0.6–1.3)
EST. AVERAGE GLUCOSE BLD GHB EST-MCNC: 123 MG/DL
GFR SERPL CREATININE-BSD FRML MDRD: 45 ML/MIN/1.73SQ M
GLUCOSE P FAST SERPL-MCNC: 104 MG/DL (ref 65–99)
HBA1C MFR BLD: 5.9 %
HDLC SERPL-MCNC: 60 MG/DL
LDLC SERPL CALC-MCNC: 93 MG/DL (ref 0–100)
NONHDLC SERPL-MCNC: 134 MG/DL
POTASSIUM SERPL-SCNC: 4.4 MMOL/L (ref 3.5–5.3)
PROT SERPL-MCNC: 7.2 G/DL (ref 6.4–8.2)
SODIUM SERPL-SCNC: 141 MMOL/L (ref 136–145)
TRIGL SERPL-MCNC: 206 MG/DL
TSH SERPL DL<=0.05 MIU/L-ACNC: 2.1 UIU/ML (ref 0.36–3.74)
URATE SERPL-MCNC: 9.9 MG/DL (ref 2–6.8)

## 2021-05-12 PROCEDURE — 36415 COLL VENOUS BLD VENIPUNCTURE: CPT

## 2021-05-12 PROCEDURE — 83036 HEMOGLOBIN GLYCOSYLATED A1C: CPT

## 2021-05-12 PROCEDURE — 80061 LIPID PANEL: CPT

## 2021-05-12 PROCEDURE — 84443 ASSAY THYROID STIM HORMONE: CPT

## 2021-05-12 PROCEDURE — 80053 COMPREHEN METABOLIC PANEL: CPT

## 2021-05-12 PROCEDURE — 84550 ASSAY OF BLOOD/URIC ACID: CPT

## 2021-05-13 ENCOUNTER — TELEPHONE (OUTPATIENT)
Dept: FAMILY MEDICINE CLINIC | Facility: CLINIC | Age: 86
End: 2021-05-13

## 2021-05-13 DIAGNOSIS — M1A.9XX0 CHRONIC GOUT WITHOUT TOPHUS, UNSPECIFIED CAUSE, UNSPECIFIED SITE: Primary | ICD-10-CM

## 2021-05-13 RX ORDER — ALLOPURINOL 100 MG/1
100 TABLET ORAL DAILY
Qty: 30 TABLET | Refills: 5 | Status: SHIPPED | OUTPATIENT
Start: 2021-05-13 | End: 2021-09-10 | Stop reason: SDUPTHER

## 2021-05-13 NOTE — TELEPHONE ENCOUNTER
----- Message from Greta Paredes MD sent at 5/12/2021  3:27 PM EDT -----  Pl, advise pt -  All labs are good except for high Uric CAcid level -  I would suggested daily med Allopurinol to decrease the uric acid level and prevent future gout attacks - I will send med over if pt is Naomi Cintron with this plan

## 2021-05-13 NOTE — TELEPHONE ENCOUNTER
Pt advsied, pt said she has tried everything and not working so yes time to start med   [facility] in [facility]

## 2021-05-13 NOTE — TELEPHONE ENCOUNTER
----- Message from Capri Harmon MD sent at 5/12/2021  3:27 PM EDT -----  Pl, advise pt -  All labs are good except for high Uric CAcid level -  I would suggested daily med Allopurinol to decrease the uric acid level and prevent future gout attacks - I will send med over if pt is Brenton Steen with this plan

## 2021-05-18 ENCOUNTER — CONSULT (OUTPATIENT)
Dept: SURGERY | Facility: CLINIC | Age: 86
End: 2021-05-18
Payer: MEDICARE

## 2021-05-18 VITALS
TEMPERATURE: 98.2 F | DIASTOLIC BLOOD PRESSURE: 62 MMHG | BODY MASS INDEX: 26.61 KG/M2 | HEART RATE: 64 BPM | WEIGHT: 132 LBS | SYSTOLIC BLOOD PRESSURE: 146 MMHG | HEIGHT: 59 IN

## 2021-05-18 DIAGNOSIS — D17.1 LIPOMA OF BACK: ICD-10-CM

## 2021-05-18 PROCEDURE — 99203 OFFICE O/P NEW LOW 30 MIN: CPT | Performed by: SURGERY

## 2021-05-18 RX ORDER — CLINDAMYCIN PHOSPHATE 900 MG/50ML
900 INJECTION INTRAVENOUS ONCE
Status: CANCELLED | OUTPATIENT
Start: 2021-06-11

## 2021-05-18 NOTE — PROGRESS NOTES
H&P Exam - General Surgery   Carole Hunt 80 y o  female MRN: [id number]  Unit/Bed#:  Encounter: [id number]    Assessment/Plan     Assessment:  79 yo F with soft tissue mass of LEFT Shoulder     HTN   HLD   CKD stage 2   Last EKG: Not on file   Last labs: 5/12/2021: personally reviewed, normal   Note from 5/11/21: Dr Philly Chavez personally reviewed, case discussed    Plan:  Discussed with patient plan for surgical removal of left shoulder and left chest wall soft tissue mass  Explained to patient risks/benefits and alternatives such as bleeding infection and recurrence  She is agreeable to proceed  COVID test   EKG     Patient aware she will need , she is aware      History of Present Illness   HPI:  Carole Hunt is a 80 y o  female who presents referred fromj Dr Ilene Patterson seen on 5/11/2021 for a follow up visit, with increased size of soft tissue mass of left shoudler  Increasing in size and now causing her problems   Also has smaller lesion on left chest        REVIEW OF SYSTEMS  Constitutional:  Denies fever or chills   Eyes:  Denies change in visual acuity   HENT:  Denies nasal congestion or sore throat   Respiratory:  Denies cough or shortness of breath   Cardiovascular:  Denies chest pain or edema history of hypertension  GI:  Denies abdominal pain, nausea, vomiting, bloody stools or diarrhea   :  Denies dysuria, frequency, difficulty in micturition and nocturia  Musculoskeletal:  + back pain associated with pain over left shoulder mass  Neurologic:  Denies headache, focal weakness or sensory changes   Endocrine:  History of diabetes mellitus  Lymphatic:  Denies swollen glands   Psychiatric:  Denies depression or anxiety       Historical Information   Past Medical History:   Diagnosis Date    Anesthesia     groggy, difficult to awaken    Asthma     Chronic kidney disease     stage 2 mild    Chronic pain disorder     lumbar herniated discs    Dry eyes     Hyperlipidemia     Hypertension Past Surgical History:   Procedure Laterality Date    APPENDECTOMY      CATARACT EXTRACTION Bilateral     COLONOSCOPY N/A 10/25/2017    Procedure: COLONOSCOPY;  Surgeon: Griselda Aranda MD;  Location: [place] [address] [facility]; Service: Gastroenterology    JOINT REPLACEMENT Bilateral     partial knee     Social History   Social History     Substance and Sexual Activity   Alcohol Use No     Social History     Substance and Sexual Activity   Drug Use No     Social History     Tobacco Use   Smoking Status Never Smoker   Smokeless Tobacco Never Used     E-Cigarette/Vaping     E-Cigarette/Vaping Substances     Family History: non-contributory    Meds/Allergies   all medications and allergies reviewed  Allergies   Allergen Reactions    Atorvastatin      Other reaction(s): Leg Cramps  Reaction Date: 15Nov2010;     Azithromycin      Other reaction(s): Unknown Allergic Reaction  Reaction Date: [id number]; Annotation - [id number]: jjw    Codeine Nausea Only     Reaction Date: [id number]; Annotation - [id number]: jjw    Moxifloxacin      Other reaction(s): Diarrhea    Penicillins Rash     Reaction Date: [id number];  Annotation - [id number]: jjw       Objective   First Vitals:   @VSFIRST2(5,8,6,7,9,11,14,10:FIRST)@    Current Vitals:        [unfilled]    Invasive Devices     None                 Physical Exam:  Vital Signs: /62 (BP Location: Right arm, Patient Position: Sitting, Cuff Size: Standard)   Pulse 64   Temp 98 2 °F (36 8 °C) (Core)   Ht 4' 11" (1 499 m)   Wt 59 9 kg (132 lb)   BMI 26 66 kg/m²   Gen: No acute distress    Eyes: Extraocular motion intact, conjunctiva normal    Neck: Trachea midline, no thyromegaly, No JVD   Pulm: No increased work of breathing    Card:Regular rate    Abd: Soft, non-distended, non-tender, no guarding, no rebound    Ext: No cyanosis or edema bilateral    Neuro: Patient oriented to time and place   Psych: Appropriate affect, no obvious psychosis   Skin: No rashes   Left back/shoulder is approximately a 6cm lesion soft mobile  No overlying skin changes   Left chest is a small 1-2cm lesion freely mobile   Rectal: deferred      Lab Results: I have personally reviewed pertinent lab results  , CBC: No results found for: WBC, HGB, HCT, MCV, PLT, ADJUSTEDWBC, MCH, MCHC, RDW, MPV, NRBC, CMP: No results found for: SODIUM, K, CL, CO2, ANIONGAP, BUN, CREATININE, GLUCOSE, CALCIUM, AST, ALT, ALKPHOS, PROT, BILITOT, EGFR  Imaging: I have personally reviewed pertinent reports  EKG, Pathology, and Other Studies: I have personally reviewed pertinent reports  Code Status: [unfilled]  Advance Directive and Living Will:      Power of :    POLST:      Counseling / Coordination of Care  Total floor / unit time spent today 30 minutes  Greater than 50% of total time was spent with the patient and / or family counseling and / or coordination of care  A description of the counseling / coordination of care: 30

## 2021-05-18 NOTE — H&P
H&P Exam - General Surgery   Lorene Gupta 80 y o  female MRN: [id number]  Unit/Bed#:  Encounter: [id number]    Assessment/Plan     Assessment:  79 yo F with soft tissue mass of LEFT Shoulder     HTN   HLD   CKD stage 2   Last EKG: Not on file   Last labs: 5/12/2021: personally reviewed, normal   Note from 5/11/21: Dr Zak Kruger personally reviewed, case discussed    Plan:  Discussed with patient plan for surgical removal of left shoulder and left chest wall soft tissue mass  Explained to patient risks/benefits and alternatives such as bleeding infection and recurrence  She is agreeable to proceed  COVID test   EKG     Patient aware she will need , she is aware      History of Present Illness   HPI:  Lorene Gupta is a 80 y o  female who presents referred fromj Dr Fred Self seen on 5/11/2021 for a follow up visit, with increased size of soft tissue mass of left shoudler  Increasing in size and now causing her problems   Also has smaller lesion on left chest        REVIEW OF SYSTEMS  Constitutional:  Denies fever or chills   Eyes:  Denies change in visual acuity   HENT:  Denies nasal congestion or sore throat   Respiratory:  Denies cough or shortness of breath   Cardiovascular:  Denies chest pain or edema history of hypertension  GI:  Denies abdominal pain, nausea, vomiting, bloody stools or diarrhea   :  Denies dysuria, frequency, difficulty in micturition and nocturia  Musculoskeletal:  + back pain associated with pain over left shoulder mass  Neurologic:  Denies headache, focal weakness or sensory changes   Endocrine:  History of diabetes mellitus  Lymphatic:  Denies swollen glands   Psychiatric:  Denies depression or anxiety       Historical Information   Past Medical History:   Diagnosis Date    Anesthesia     groggy, difficult to awaken    Asthma     Chronic kidney disease     stage 2 mild    Chronic pain disorder     lumbar herniated discs    Dry eyes     Hyperlipidemia     Hypertension Past Surgical History:   Procedure Laterality Date    APPENDECTOMY      CATARACT EXTRACTION Bilateral     COLONOSCOPY N/A 10/25/2017    Procedure: COLONOSCOPY;  Surgeon: Zee Duncan MD;  Location: [place] [address] [facility]; Service: Gastroenterology    JOINT REPLACEMENT Bilateral     partial knee     Social History   Social History     Substance and Sexual Activity   Alcohol Use No     Social History     Substance and Sexual Activity   Drug Use No     Social History     Tobacco Use   Smoking Status Never Smoker   Smokeless Tobacco Never Used     E-Cigarette/Vaping     E-Cigarette/Vaping Substances     Family History: non-contributory    Meds/Allergies   all medications and allergies reviewed  Allergies   Allergen Reactions    Atorvastatin      Other reaction(s): Leg Cramps  Reaction Date: 15Nov2010;     Azithromycin      Other reaction(s): Unknown Allergic Reaction  Reaction Date: [id number]; Annotation - [id number]: jjw    Codeine Nausea Only     Reaction Date: [id number]; Annotation - [id number]: jjw    Moxifloxacin      Other reaction(s): Diarrhea    Penicillins Rash     Reaction Date: [id number];  Annotation - [id number]: jjw       Objective   First Vitals:   @VSFIRST2(5,8,6,7,9,11,14,10:FIRST)@    Current Vitals:        [unfilled]    Invasive Devices     None                 Physical Exam:  Vital Signs: /62 (BP Location: Right arm, Patient Position: Sitting, Cuff Size: Standard)   Pulse 64   Temp 98 2 °F (36 8 °C) (Core)   Ht 4' 11" (1 499 m)   Wt 59 9 kg (132 lb)   BMI 26 66 kg/m²   Gen: No acute distress    Eyes: Extraocular motion intact, conjunctiva normal    Neck: Trachea midline, no thyromegaly, No JVD   Pulm: No increased work of breathing    Card:Regular rate    Abd: Soft, non-distended, non-tender, no guarding, no rebound    Ext: No cyanosis or edema bilateral    Neuro: Patient oriented to time and place   Psych: Appropriate affect, no obvious psychosis   Skin: No rashes   Left back/shoulder is approximately a 6cm lesion soft mobile  No overlying skin changes   Left chest is a small 1-2cm lesion freely mobile   Rectal: deferred      Lab Results: I have personally reviewed pertinent lab results  , CBC: No results found for: WBC, HGB, HCT, MCV, PLT, ADJUSTEDWBC, MCH, MCHC, RDW, MPV, NRBC, CMP: No results found for: SODIUM, K, CL, CO2, ANIONGAP, BUN, CREATININE, GLUCOSE, CALCIUM, AST, ALT, ALKPHOS, PROT, BILITOT, EGFR  Imaging: I have personally reviewed pertinent reports  EKG, Pathology, and Other Studies: I have personally reviewed pertinent reports  Code Status: [unfilled]  Advance Directive and Living Will:      Power of :    POLST:      Counseling / Coordination of Care  Total floor / unit time spent today 30 minutes  Greater than 50% of total time was spent with the patient and / or family counseling and / or coordination of care  A description of the counseling / coordination of care: 30

## 2021-05-20 DIAGNOSIS — I10 ESSENTIAL HYPERTENSION: ICD-10-CM

## 2021-05-20 RX ORDER — METOPROLOL SUCCINATE 50 MG/1
TABLET, EXTENDED RELEASE ORAL
Qty: 90 TABLET | Refills: 0 | Status: SHIPPED | OUTPATIENT
Start: 2021-05-20 | End: 2021-08-17

## 2021-05-26 ENCOUNTER — OFFICE VISIT (OUTPATIENT)
Dept: LAB | Facility: HOSPITAL | Age: 86
End: 2021-05-26
Attending: SURGERY
Payer: MEDICARE

## 2021-05-26 ENCOUNTER — TRANSCRIBE ORDERS (OUTPATIENT)
Dept: ADMINISTRATIVE | Facility: HOSPITAL | Age: 86
End: 2021-05-26

## 2021-05-26 DIAGNOSIS — D17.1 LIPOMA OF BACK: ICD-10-CM

## 2021-05-26 PROCEDURE — 93005 ELECTROCARDIOGRAM TRACING: CPT

## 2021-05-28 LAB
ATRIAL RATE: 58 BPM
P AXIS: 27 DEGREES
PR INTERVAL: 196 MS
QRS AXIS: -2 DEGREES
QRSD INTERVAL: 102 MS
QT INTERVAL: 438 MS
QTC INTERVAL: 429 MS
T WAVE AXIS: 50 DEGREES
VENTRICULAR RATE: 58 BPM

## 2021-05-28 PROCEDURE — 93010 ELECTROCARDIOGRAM REPORT: CPT | Performed by: INTERNAL MEDICINE

## 2021-06-05 DIAGNOSIS — D17.1 LIPOMA OF BACK: ICD-10-CM

## 2021-06-05 PROCEDURE — U0005 INFEC AGEN DETEC AMPLI PROBE: HCPCS | Performed by: SURGERY

## 2021-06-05 PROCEDURE — U0003 INFECTIOUS AGENT DETECTION BY NUCLEIC ACID (DNA OR RNA); SEVERE ACUTE RESPIRATORY SYNDROME CORONAVIRUS 2 (SARS-COV-2) (CORONAVIRUS DISEASE [COVID-19]), AMPLIFIED PROBE TECHNIQUE, MAKING USE OF HIGH THROUGHPUT TECHNOLOGIES AS DESCRIBED BY CMS-2020-01-R: HCPCS | Performed by: SURGERY

## 2021-06-06 LAB — SARS-COV-2 RNA RESP QL NAA+PROBE: NEGATIVE

## 2021-06-08 DIAGNOSIS — J45.40 MODERATE PERSISTENT ASTHMA WITHOUT COMPLICATION: ICD-10-CM

## 2021-06-08 RX ORDER — BECLOMETHASONE DIPROPIONATE HFA 80 UG/1
AEROSOL, METERED RESPIRATORY (INHALATION)
Qty: 11 G | Refills: 0 | Status: SHIPPED | OUTPATIENT
Start: 2021-06-08 | End: 2021-07-09

## 2021-06-10 ENCOUNTER — ANESTHESIA EVENT (OUTPATIENT)
Dept: PERIOP | Facility: HOSPITAL | Age: 86
End: 2021-06-10
Payer: MEDICARE

## 2021-06-10 PROBLEM — T88.59XA DELAYED EMERGENCE FROM ANESTHESIA: Status: ACTIVE | Noted: 2021-06-10

## 2021-06-10 PROBLEM — M54.9 BACK PAIN: Status: ACTIVE | Noted: 2021-06-10

## 2021-06-10 NOTE — ANESTHESIA PREPROCEDURE EVALUATION
Procedure:  EXCISION  BIOPSY LESION/MASS BACK, LEFT BACK, LEFT CHEST WALL (Left Back)    Relevant Problems   ANESTHESIA   (+) Delayed emergence from anesthesia      CARDIO   (+) Benign essential hypertension   (+) Mixed hyperlipidemia      ENDO   (+) Other specified hypothyroidism      /RENAL   (+) Chronic kidney disease, stage II (mild)      MUSCULOSKELETAL   (+) Back pain      PULMONARY   (+) Asthma        Physical Exam    Airway    Mallampati score: II  TM Distance: >3 FB  Neck ROM: full     Dental       Cardiovascular  Rhythm: regular, Rate: normal,     Pulmonary  Breath sounds clear to auscultation,     Other Findings        Anesthesia Plan  ASA Score- 3     Anesthesia Type- general with ASA Monitors  Additional Monitors:   Airway Plan: LMA  Plan Factors-    Chart reviewed  Patient is not a current smoker  Induction- intravenous  Postoperative Plan- Plan for postoperative opioid use  Informed Consent- Anesthetic plan and risks discussed with patient  I personally reviewed this patient with the CRNA  Discussed and agreed on the Anesthesia Plan with the CRNA  Nikole Hammond

## 2021-06-11 ENCOUNTER — ANESTHESIA (OUTPATIENT)
Dept: PERIOP | Facility: HOSPITAL | Age: 86
End: 2021-06-11
Payer: MEDICARE

## 2021-06-11 ENCOUNTER — HOSPITAL ENCOUNTER (OUTPATIENT)
Facility: HOSPITAL | Age: 86
Setting detail: OUTPATIENT SURGERY
Discharge: HOME | End: 2021-06-11
Attending: SURGERY | Admitting: SURGERY
Payer: MEDICARE

## 2021-06-11 VITALS
HEART RATE: 65 BPM | TEMPERATURE: 97.6 F | SYSTOLIC BLOOD PRESSURE: 139 MMHG | DIASTOLIC BLOOD PRESSURE: 60 MMHG | WEIGHT: 132.6 LBS | RESPIRATION RATE: 18 BRPM | HEIGHT: 59 IN | BODY MASS INDEX: 26.73 KG/M2 | OXYGEN SATURATION: 97 %

## 2021-06-11 DIAGNOSIS — D17.1 LIPOMA OF BACK: ICD-10-CM

## 2021-06-11 PROCEDURE — 88304 TISSUE EXAM BY PATHOLOGIST: CPT | Performed by: PATHOLOGY

## 2021-06-11 PROCEDURE — 21555 EXC NECK LES SC < 3 CM: CPT | Performed by: SURGERY

## 2021-06-11 PROCEDURE — 21555 EXC NECK LES SC < 3 CM: CPT | Performed by: PHYSICIAN ASSISTANT

## 2021-06-11 PROCEDURE — 23071 EXC SHOULDER LES SC 3 CM/>: CPT | Performed by: SURGERY

## 2021-06-11 PROCEDURE — NC001 PR NO CHARGE: Performed by: SURGERY

## 2021-06-11 PROCEDURE — 23071 EXC SHOULDER LES SC 3 CM/>: CPT | Performed by: PHYSICIAN ASSISTANT

## 2021-06-11 RX ORDER — ONDANSETRON 2 MG/ML
4 INJECTION INTRAMUSCULAR; INTRAVENOUS ONCE AS NEEDED
Status: DISCONTINUED | OUTPATIENT
Start: 2021-06-11 | End: 2021-06-11 | Stop reason: HOSPADM

## 2021-06-11 RX ORDER — BUPIVACAINE HYDROCHLORIDE AND EPINEPHRINE 5; 5 MG/ML; UG/ML
INJECTION, SOLUTION PERINEURAL AS NEEDED
Status: DISCONTINUED | OUTPATIENT
Start: 2021-06-11 | End: 2021-06-11 | Stop reason: HOSPADM

## 2021-06-11 RX ORDER — PROPOFOL 10 MG/ML
INJECTION, EMULSION INTRAVENOUS AS NEEDED
Status: DISCONTINUED | OUTPATIENT
Start: 2021-06-11 | End: 2021-06-11

## 2021-06-11 RX ORDER — ONDANSETRON 2 MG/ML
INJECTION INTRAMUSCULAR; INTRAVENOUS AS NEEDED
Status: DISCONTINUED | OUTPATIENT
Start: 2021-06-11 | End: 2021-06-11

## 2021-06-11 RX ORDER — DEXAMETHASONE SODIUM PHOSPHATE 4 MG/ML
INJECTION, SOLUTION INTRA-ARTICULAR; INTRALESIONAL; INTRAMUSCULAR; INTRAVENOUS; SOFT TISSUE AS NEEDED
Status: DISCONTINUED | OUTPATIENT
Start: 2021-06-11 | End: 2021-06-11

## 2021-06-11 RX ORDER — MAGNESIUM HYDROXIDE 1200 MG/15ML
LIQUID ORAL AS NEEDED
Status: DISCONTINUED | OUTPATIENT
Start: 2021-06-11 | End: 2021-06-11 | Stop reason: HOSPADM

## 2021-06-11 RX ORDER — CLINDAMYCIN PHOSPHATE 900 MG/50ML
900 INJECTION INTRAVENOUS ONCE
Status: COMPLETED | OUTPATIENT
Start: 2021-06-11 | End: 2021-06-11

## 2021-06-11 RX ORDER — FENTANYL CITRATE/PF 50 MCG/ML
25 SYRINGE (ML) INJECTION
Status: DISCONTINUED | OUTPATIENT
Start: 2021-06-11 | End: 2021-06-11 | Stop reason: HOSPADM

## 2021-06-11 RX ORDER — EPHEDRINE SULFATE 50 MG/ML
INJECTION INTRAVENOUS AS NEEDED
Status: DISCONTINUED | OUTPATIENT
Start: 2021-06-11 | End: 2021-06-11

## 2021-06-11 RX ORDER — SODIUM CHLORIDE, SODIUM LACTATE, POTASSIUM CHLORIDE, CALCIUM CHLORIDE 600; 310; 30; 20 MG/100ML; MG/100ML; MG/100ML; MG/100ML
75 INJECTION, SOLUTION INTRAVENOUS CONTINUOUS
Status: DISCONTINUED | OUTPATIENT
Start: 2021-06-11 | End: 2021-06-11 | Stop reason: HOSPADM

## 2021-06-11 RX ORDER — GLYCOPYRROLATE 0.2 MG/ML
INJECTION INTRAMUSCULAR; INTRAVENOUS AS NEEDED
Status: DISCONTINUED | OUTPATIENT
Start: 2021-06-11 | End: 2021-06-11

## 2021-06-11 RX ADMIN — PROPOFOL 100 MG: 10 INJECTION, EMULSION INTRAVENOUS at 09:29

## 2021-06-11 RX ADMIN — ONDANSETRON 4 MG: 2 INJECTION INTRAMUSCULAR; INTRAVENOUS at 09:50

## 2021-06-11 RX ADMIN — GLYCOPYRROLATE 0.2 MG: 0.2 INJECTION, SOLUTION INTRAMUSCULAR; INTRAVENOUS at 09:35

## 2021-06-11 RX ADMIN — EPHEDRINE SULFATE 10 MG: 50 INJECTION, SOLUTION INTRAVENOUS at 09:40

## 2021-06-11 RX ADMIN — CLINDAMYCIN PHOSPHATE 900 MG: 18 INJECTION, SOLUTION INTRAMUSCULAR; INTRAVENOUS at 09:34

## 2021-06-11 RX ADMIN — DEXAMETHASONE SODIUM PHOSPHATE 4 MG: 4 INJECTION, SOLUTION INTRA-ARTICULAR; INTRALESIONAL; INTRAMUSCULAR; INTRAVENOUS; SOFT TISSUE at 09:50

## 2021-06-11 RX ADMIN — PROPOFOL 50 MG: 10 INJECTION, EMULSION INTRAVENOUS at 09:35

## 2021-06-11 RX ADMIN — SODIUM CHLORIDE, SODIUM LACTATE, POTASSIUM CHLORIDE, AND CALCIUM CHLORIDE 75 ML/HR: .6; .31; .03; .02 INJECTION, SOLUTION INTRAVENOUS at 08:40

## 2021-06-11 RX ADMIN — LIDOCAINE HYDROCHLORIDE 50 MG: 20 INJECTION, SOLUTION INTRAVENOUS at 09:29

## 2021-06-11 NOTE — DISCHARGE INSTRUCTIONS
Please follow-up as scheduled  Activity:  - As tolerated  - Walking and normal light activities are encouraged  - Normal daily activities including climbing steps are okay  - No driving until no longer using pain medications  Diet:    - You may resume your normal diet  Wound Care:  - May shower daily  No tub baths or swimming until cleared by your surgeon   - Wash incision gently with soap and water and pat dry  - Do not apply any creams or ointments unless instructed to do so by your surgeon   - Leslie Marinelli may apply ice as needed (no longer than 20 minutes at a time) for the first 48 hours  - Bruising is not unusual and will go away with a little time  You may apply a warm, moist compress that may help the bruising resolve quicker  - You may remove the dressings the day after surgery (unless otherwise instructed)  Leave any skin tapes (steri-strips) on the incision(s) in place until they fall off on their own  Any new dressings are optional     Medications:    - You may resume all of your regular medications, including blood thinners and aspirin, after going home unless otherwise instructed  Please refer to your discharge medication list for further details  - Please take the pain medications as directed  - You are encouraged to use non-narcotic pain medications first and whenever possible  Reserve the use of narcotic pain medication for moderate to severe pain not controlled by non-narcotic medications   - No driving while taking narcotic pain medications  - You may become constipated, especially if taking pain medications  You may take any over the counter stool softeners or laxatives as needed  Examples: Milk of Magnesia, Colace, Senna      Additional Instructions:  - If you have any questions or concerns after discharge please call the office   - Call office or return to ER if fever greater than 101, chills, persistent nausea/vomiting, worsening/uncontrollable pain, and/or increasing redness or purulent/foul smelling drainage from incision(s)

## 2021-06-11 NOTE — PERIOPERATIVE NURSING NOTE
Pt d/c to home at this time w/girlfriend,   Via wheelchair  Pt left with all belongings  Iv was D/C intact with dry sterile dressing  Encouraged to keep follow up appointments, Verbalized understanding  D/C instructions reviewed and explained  Verbalized understanding

## 2021-06-11 NOTE — OP NOTE
OPERATIVE REPORT  PATIENT NAME: Roxi Xavier    :  1931  MRN: [id number]  Pt Location: [facility] OR ROOM 01    SURGERY DATE: 2021    Surgeon(s) and Role:     * Dianne Allison MD - Primary     * Darryle Class, PA-C - Assisting    Preop Diagnosis:  Lipoma of back [D17 1]    Post-Op Diagnosis Codes:     * Lipoma of back [D17 1]    Procedure(s) (LRB):  EXCISION  BIOPSY LESION/MASS BACK, LEFT BACK, LEFT CHEST WALL (Left)    Specimen(s):  ID Type Source Tests Collected by Time Destination   1 : Left Shoulder Tissue Soft Tissue, Lipoma TISSUE EXAM Dianne Allison MD 2021 [id number]    2 : Left Chest Wall Tissue Soft Tissue, Lipoma TISSUE EXAM Dianne Allison MD 2021 [id number]        Estimated Blood Loss:   Minimal    Drains:  * No LDAs found *    Anesthesia Type:   General    Operative Indications:  Lipoma of back [D17 1]    Soft tissue masses: left shoulder and left chest     Operative Findings:  Left shoulder mass: soft tissue mass measuring approximately approximately 3cm in diameter, in layer of subcutaneous tissue     Left chest: soft tissue mass measuring 1cm in diameter, in layer of subcutaneous tissue     Complications:   None    Procedure and Technique:  Patient was seen in preoperative holding  The risks/benefits and alternatives of the procedure were explained to the patient, as well as the most recent H&P  She was agreeable to proceed and was brought back to the operating room and identified by name and birthdate  Next, LMA inserted by anesthesia and the patient was positioned with a bump under her left chest  The patient was then draped and prepped in the usual sterile fashion and a time out was held  Preoperative antibiotics were given  First, attention was turned towards the left shoudler mass  Overlying the mass, approximately a 3cm incision was made and carried down through the subcutaneous tissue with an electrocautery bovie  A 4 5cm mass was excised from the soft tissue consistant with fatty tussue  This was sent for pathology  Once the mass was removed, the area was irrigated and closed with 3-0 vicryl and 4-0 monocryl  Next, attention was turned towards the left chest  A 1cm incision was made and again a fatty soft tissue mass approximately 1 5cm was removed from the subcutaneous tissue  The area was irrigated and closed with a 3-0 vicryl and 4-0 monocryl  Patient tolerated the procedure, LMA was removed and she was brought to PACU in stable condition  Prem Traylor, was present and scrubbed to assist with retraction  A qualified resident was not available  I was present and scrubbed for the entire case  All counts were correct at the end of the case  RF wanding was completed at the end of the case with no detection of sponges         Patient Disposition:  PACU     SIGNATURE: Sanaz Jones MD  DATE: June 11, 2021  TIME: 2:29 PM

## 2021-06-11 NOTE — ANESTHESIA POSTPROCEDURE EVALUATION
Post-Op Assessment Note    CV Status:  Stable       Mental Status:  Sleepy   Hydration Status:  Stable   PONV Controlled:  Controlled   Airway Patency:  Patent      Post Op Vitals Reviewed: Yes      Staff: CRNA         No complications documented      BP  170/80   Temp      Pulse  77   Resp   20   SpO2   98

## 2021-06-11 NOTE — H&P
H&P reviewed  After examining the patient I find no changes in the patients condition since the H&P had been written  I saw and discussed the case with the patient  She is agreeable to proceed with surgery understands the risk of bleeding, infection, and recurrence either at the similar size different location      The left chest wall mass in the left shoulder mass were marked    All questions were answered regarding discharge    Vitals:    06/11/21 0838   BP: (!) 173/73   Pulse: 60   Resp: 16   Temp: 97 6 °F (36 4 °C)   SpO2: 97%

## 2021-06-11 NOTE — PERIOPERATIVE NURSING NOTE
Received patient from PACU into APU room 9, patient is alert and awake, VSS, denies pain, no distress noted,  steri strips  Intact to LCW and left back wounds, no s/s of bleeding noted  Patient tolerating PO fluids, , call bell placed in reach, side rails up on stretcher, will continue to monitor patient

## 2021-06-21 ENCOUNTER — OFFICE VISIT (OUTPATIENT)
Dept: SURGERY | Facility: CLINIC | Age: 86
End: 2021-06-21

## 2021-06-21 VITALS
HEART RATE: 67 BPM | DIASTOLIC BLOOD PRESSURE: 66 MMHG | HEIGHT: 59 IN | BODY MASS INDEX: 26.93 KG/M2 | WEIGHT: 133.6 LBS | SYSTOLIC BLOOD PRESSURE: 122 MMHG

## 2021-06-21 DIAGNOSIS — D17.1 LIPOMA OF BACK: Primary | ICD-10-CM

## 2021-06-21 PROCEDURE — 99024 POSTOP FOLLOW-UP VISIT: CPT | Performed by: SURGERY

## 2021-06-21 NOTE — PROGRESS NOTES
Subjective:       Kyle King presents to the clinic following 6/11/21 EXCISION  BIOPSY LESION/MASS BACK, LEFT BACK, LEFT CHEST WALL    Patient doing well   Pathology reviewed with patient: Lipoma from each excision     Patient has done exceptionally well  Reports no pain, has been back to regular activity      Objective: There were no vitals taken for this visit  General:  alert and oriented, in no acute distress   Abdomen: soft, bowel sounds active, non-tender   Incision:   healing well, no drainage, no erythema, no hernia, no seroma, no swelling, no dehiscence, incision well approximated       Assessment:      Doing well postoperatively  Plan:      1  Continue any current medications  2  Wound care discussed  3  Pt is to increase activities as tolerated    4  Follow up prn

## 2021-07-02 ENCOUNTER — TELEPHONE (OUTPATIENT)
Dept: FAMILY MEDICINE CLINIC | Facility: CLINIC | Age: 86
End: 2021-07-02

## 2021-07-02 NOTE — TELEPHONE ENCOUNTER
Dr Corinne Welsh,    Patient is trying to refill her rosuvastatin gisel 5mg and the pharmacist said it was cancelled  Patient would like to know if she still should be taking this medication    Please advise

## 2021-07-09 DIAGNOSIS — J45.40 MODERATE PERSISTENT ASTHMA WITHOUT COMPLICATION: ICD-10-CM

## 2021-07-09 RX ORDER — BECLOMETHASONE DIPROPIONATE HFA 80 UG/1
AEROSOL, METERED RESPIRATORY (INHALATION)
Qty: 11 G | Refills: 0 | Status: SHIPPED | OUTPATIENT
Start: 2021-07-09 | End: 2021-08-09

## 2021-08-09 DIAGNOSIS — E03.8 SUBCLINICAL HYPOTHYROIDISM: ICD-10-CM

## 2021-08-09 DIAGNOSIS — J45.40 MODERATE PERSISTENT ASTHMA WITHOUT COMPLICATION: ICD-10-CM

## 2021-08-09 RX ORDER — LEVOTHYROXINE SODIUM 0.05 MG/1
TABLET ORAL
Qty: 90 TABLET | Refills: 0 | Status: SHIPPED | OUTPATIENT
Start: 2021-08-09 | End: 2021-08-20 | Stop reason: SDUPTHER

## 2021-08-09 RX ORDER — BECLOMETHASONE DIPROPIONATE HFA 80 UG/1
AEROSOL, METERED RESPIRATORY (INHALATION)
Qty: 11 G | Refills: 0 | Status: SHIPPED | OUTPATIENT
Start: 2021-08-09 | End: 2021-09-08

## 2021-08-11 NOTE — PROGRESS NOTES
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, likely secondary to underlying chronic kidney disease  - Joyce Hanley presents today for a follow-up of gout which had affected her right foot 2nd digit in January 2021  She has overall done well since the last office visit but due to this episode as well as hyperuricemia she was started on allopurinol 100 mg once daily in May 2021 by her primary care physician  She has not had a uric acid level checked after this and it will be repeated today  If the level is still high but as she has been asymptomatic she may not necessarily need a dose increase in the allopurinol and perhaps it can be continued at 100 mg once daily  As she has had an uncomplicated course she can continue follow-up with her primary care physician and does not need to see Rheumatology  She will continue with maintaining a low purine diet        Plan:  Diagnoses and all orders for this visit:    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, unspecified cause  -     Uric acid; Future    Stage 3b chronic kidney disease (HCC)    Osteopenia, unspecified location    Other orders  -     ofloxacin (FLOXIN) 0 3 % otic solution; INSTILL 1 DROP INTO EACH EAR TWICE DAILY FOR 20 DAYS      Activities as tolerated  Exercise: try to maintain a low impact exercise regimen as much as possible  Continue other medications as prescribed by PCP and other specialists  RTC PRN         HPI    INITIAL VISIT NOTE (2/2021):  Ms Jayne Tony is an 15-year-old female with history significant for recently diagnosed gout affecting her right foot 2nd digit and chronic kidney disease stage 3, who presents for further evaluation of Lab called about pending CBC results that are still marked as in process. Lab states that one of patient values was abnormal and needed to be redrawn. This RN was not notified of need for redraw.   the gout  She is referred by Dr Astrid Walsh for a rheumatology consult        She reports in early January she had an acute onset of pain and swelling along with redness affecting her right foot 2nd toe  She was seen by her primary care physician initially and then referred to Podiatry  There were concerns for an infection versus gout  She was not started on antibiotics  She was given colchicine for 3 days which she states helped  She was not prescribed steroids or allopurinol at any time  She reports since then the symptoms affecting that digit have not recurred  She denies a prior history of gout or having any acutely painful or swollen joints  She does experience chronic pain affecting her back and bilateral knees secondary to known osteoarthritis  No other joint issues today      She reports just prior to this she had been dealing with diarrhea and was following with Gastroenterology  She was advised dietary modifications and thinks that this may have contributed to the gout flare  She keeps herself well hydrated and denies drinking alcohol  She was consuming red meat frequently but since the gout flare has discontinued this  She does not eat shellfish  8/12/2021:  Patient presents for a follow-up of chronic gout  She is currently on allopurinol 100 mg once daily that was started in May 2021 by her primary care physician  Her uric acid level just prior to this was elevated at 9 9  She reports since the last office visit she has not had any acute flare-ups of joint pains, swelling or stiffness  She has overall been doing well  She has been compliant with a low purine diet      The following portions of the patient's history were reviewed and updated as appropriate: allergies, current medications, past family history, past medical history, past social history, past surgical history and problem list       Review of Systems  Constitutional: Negative for weight change, fevers, chills, night sweats, fatigue  ENT/Mouth: Negative for hearing changes, ear pain, nasal congestion, sinus pain, hoarseness, sore throat, rhinorrhea, swallowing difficulty  Eyes: Negative for pain, redness, discharge, vision changes  Cardiovascular: Negative for chest pain, SOB, palpitations  Respiratory: Negative for cough, sputum, wheezing, dyspnea  Gastrointestinal: Negative for nausea, vomiting, diarrhea, constipation, pain, heartburn  Genitourinary: Negative for dysuria, urinary frequency, hematuria  Musculoskeletal: As per HPI  Skin: Negative for skin rash, color changes  Neuro: Negative for weakness, numbness, tingling, loss of consciousness  Psych: Negative for anxiety, depression  Heme/Lymph: Negative for easy bruising, bleeding, lymphadenopathy  Past Medical History:   Diagnosis Date    Anesthesia     groggy, difficult to awaken    Asthma     Chronic kidney disease     stage 2 mild    Chronic pain disorder     lumbar herniated discs    Dry eyes     Hyperlipidemia     Hypertension        Past Surgical History:   Procedure Laterality Date    APPENDECTOMY      CATARACT EXTRACTION Bilateral     COLONOSCOPY N/A 10/25/2017    Procedure: COLONOSCOPY;  Surgeon: Yanet Lozano MD;  Location: [facility]; Service: Gastroenterology    JOINT REPLACEMENT Bilateral     partial knee    HI EXC SKIN BENIG >4 CM TRUNK,ARM,LEG Left 6/11/2021    Procedure: EXCISION  BIOPSY LESION/MASS BACK, LEFT BACK, LEFT CHEST WALL;  Surgeon: Ky Sher MD;  Location: [facility];  Service: General       Social History     Socioeconomic History    Marital status:       Spouse name: Not on file    Number of children: Not on file    Years of education: Not on file    Highest education level: Not on file   Occupational History    Not on file   Tobacco Use    Smoking status: Never Smoker    Smokeless tobacco: Never Used   Vaping Use    Vaping Use: Never used   Substance and Sexual Activity    Alcohol use: Not Currently    Drug use: No    Sexual activity: Not on file   Other Topics Concern    Not on file   Social History Narrative    Not on file     Social Determinants of Health     Financial Resource Strain:     Difficulty of Paying Living Expenses:    Food Insecurity:     Worried About Running Out of Food in the Last Year:     [address] in the Last Year:    Transportation Needs:     Lack of Transportation (Medical):  Lack of Transportation (Non-Medical):    Physical Activity:     Days of Exercise per Week:     Minutes of Exercise per Session:    Stress:     Feeling of Stress :    Social Connections:     Frequency of Communication with Friends and Family:     Frequency of Social Gatherings with Friends and Family:     Attends Spiritism Services:     Active Member of Clubs or Organizations:     Attends Club or Organization Meetings:     Marital Status:    Intimate Partner Violence:     Fear of Current or Ex-Partner:     Emotionally Abused:     Physically Abused:     Sexually Abused:        Family History   Problem Relation Age of Onset    Heart disease Father     Aneurysm Sister     Heart disease Brother     Cancer Daughter         kidney     Heart disease Brother     Heart disease Brother     Heart disease Sister         MI    Kidney disease Sister     Heart disease Daughter        Allergies   Allergen Reactions    Atorvastatin      Other reaction(s): Leg Cramps  Reaction Date: 15Nov2010;     Azithromycin      Other reaction(s): Unknown Allergic Reaction  Reaction Date: 07Jan2004; Annotation - [id number]: jdeisyw    Codeine Nausea Only     Reaction Date: [id number]; Annotation - [id number]: jdeisyw    Moxifloxacin      Other reaction(s): Diarrhea    Penicillins Rash     Reaction Date: [id number];  Annotation - [id number]: [email or web address]       Current Outpatient Medications:     albuterol (PROAIR HFA) 90 mcg/act inhaler, Inhale 2 puffs every 6 (six) hours as needed for wheezing, Disp: 1 Inhaler, Rfl: 2    allopurinol (ZYLOPRIM) 100 mg tablet, Take 1 tablet (100 mg total) by mouth daily, Disp: 30 tablet, Rfl: 5    amLODIPine (NORVASC) 5 mg tablet, Take 1 tablet by mouth twice daily, Disp: 60 tablet, Rfl: 6    colchicine (COLCRYS) 0 6 mg tablet, Take 1 tablet by mouth daily, Disp: , Rfl:     cycloSPORINE (RESTASIS) 0 05 % ophthalmic emulsion, Administer 1 drop to both eyes 2 (two) times a day, Disp: , Rfl:     famotidine (PEPCID) 40 MG tablet, Take 1 tablet (40 mg total) by mouth daily, Disp: 30 tablet, Rfl: 3    fenofibrate (TRICOR) 145 mg tablet, TAKE 1 TABLET BY MOUTH ONCE DAILY WITH FOOD, Disp: 30 tablet, Rfl: 11    levothyroxine 50 mcg tablet, Take 1 tablet by mouth once daily, Disp: 90 tablet, Rfl: 0    metoprolol succinate (TOPROL-XL) 50 mg 24 hr tablet, TAKE 1 TABLET BY MOUTH ONCE DAILY AT BEDTIME, Disp: 90 tablet, Rfl: 0    ofloxacin (FLOXIN) 0 3 % otic solution, INSTILL 1 DROP INTO EACH EAR TWICE DAILY FOR 20 DAYS, Disp: , Rfl:     Qvar RediHaler 80 MCG/ACT inhaler, INHALE 2 PUFFS BY MOUTH TWICE DAILY RINSE MOUTH AFTER USE, Disp: 11 g, Rfl: 0    rosuvastatin (CRESTOR) 5 mg tablet, Take 1 tablet (5 mg total) by mouth daily, Disp: 90 tablet, Rfl: 3    telmisartan-hydrochlorothiazide (MICARDIS HCT) 80-12 5 MG per tablet, Take 1 tablet by mouth once daily, Disp: 90 tablet, Rfl: 1      Objective:    Vitals:    08/12/21 0946   BP: 144/64   BP Location: Left arm   Patient Position: Sitting   Cuff Size: Adult   Pulse: 66   Temp: 98 4 °F (36 9 °C)       Physical Exam  General: Well appearing, well nourished, in no distress  Oriented x 3, normal mood and affect  Ambulating without difficulty  Skin: Good turgor, no rash, unusual bruising or prominent lesions  Hair: Normal texture and distribution  Nails: Normal color, no deformities  HEENT:  Head: Normocephalic, atraumatic  Eyes: Conjunctiva clear, sclera non-icteric, EOM intact    Extremities: No amputations or deformities, cyanosis, edema   Musculoskeletal: No acutely inflamed joints noted  Neurologic: Alert and oriented  No focal neurological deficits appreciated  Psychiatric: Normal mood and affect  GEOVANI Cortes    Rheumatology

## 2021-08-12 ENCOUNTER — OFFICE VISIT (OUTPATIENT)
Dept: RHEUMATOLOGY | Facility: CLINIC | Age: 86
End: 2021-08-12
Payer: MEDICARE

## 2021-08-12 ENCOUNTER — APPOINTMENT (OUTPATIENT)
Dept: LAB | Facility: CLINIC | Age: 86
End: 2021-08-12
Payer: MEDICARE

## 2021-08-12 VITALS — DIASTOLIC BLOOD PRESSURE: 64 MMHG | HEART RATE: 66 BPM | TEMPERATURE: 98.4 F | SYSTOLIC BLOOD PRESSURE: 144 MMHG

## 2021-08-12 DIAGNOSIS — N18.32 STAGE 3B CHRONIC KIDNEY DISEASE (HCC): ICD-10-CM

## 2021-08-12 DIAGNOSIS — M1A.9XX1 CHRONIC GOUT INVOLVING TOE OF RIGHT FOOT WITH TOPHUS, UNSPECIFIED CAUSE: ICD-10-CM

## 2021-08-12 DIAGNOSIS — M85.80 OSTEOPENIA, UNSPECIFIED LOCATION: ICD-10-CM

## 2021-08-12 DIAGNOSIS — M1A.9XX1 CHRONIC GOUT INVOLVING TOE OF RIGHT FOOT WITH TOPHUS, UNSPECIFIED CAUSE: Primary | ICD-10-CM

## 2021-08-12 LAB — URATE SERPL-MCNC: 8.5 MG/DL (ref 2–6.8)

## 2021-08-12 PROCEDURE — 36415 COLL VENOUS BLD VENIPUNCTURE: CPT

## 2021-08-12 PROCEDURE — 99214 OFFICE O/P EST MOD 30 MIN: CPT | Performed by: INTERNAL MEDICINE

## 2021-08-12 PROCEDURE — 84550 ASSAY OF BLOOD/URIC ACID: CPT

## 2021-08-12 RX ORDER — OFLOXACIN 3 MG/ML
SOLUTION AURICULAR (OTIC)
COMMUNITY
Start: 2021-06-07 | End: 2021-11-09

## 2021-08-13 ENCOUNTER — TELEPHONE (OUTPATIENT)
Dept: RHEUMATOLOGY | Facility: CLINIC | Age: 86
End: 2021-08-13

## 2021-08-13 NOTE — TELEPHONE ENCOUNTER
----- Message from Clearence Scheuermann, MD sent at 8/12/2021  8:02 PM EDT -----  Please ask her to increase the allopurinol to 2 tablets once daily as the uric acid level was still high  Thanks

## 2021-08-17 DIAGNOSIS — I10 ESSENTIAL HYPERTENSION: ICD-10-CM

## 2021-08-17 RX ORDER — METOPROLOL SUCCINATE 50 MG/1
TABLET, EXTENDED RELEASE ORAL
Qty: 90 TABLET | Refills: 0 | Status: SHIPPED | OUTPATIENT
Start: 2021-08-17 | End: 2021-11-18

## 2021-08-20 DIAGNOSIS — E03.8 SUBCLINICAL HYPOTHYROIDISM: ICD-10-CM

## 2021-08-20 NOTE — TELEPHONE ENCOUNTER
Patient dropped them this morning down her bathroom sink by accident  Please send in Rx to [address] in [address] [address]  Also, Elayne Titus increased her allopurinol to 2 tablets per day  She is going to run out of this medication  Can you please send in refill?

## 2021-08-21 RX ORDER — LEVOTHYROXINE SODIUM 0.05 MG/1
50 TABLET ORAL DAILY
Qty: 90 TABLET | Refills: 3 | Status: SHIPPED | OUTPATIENT
Start: 2021-08-21

## 2021-09-07 DIAGNOSIS — J45.40 MODERATE PERSISTENT ASTHMA WITHOUT COMPLICATION: ICD-10-CM

## 2021-09-08 RX ORDER — BECLOMETHASONE DIPROPIONATE HFA 80 UG/1
AEROSOL, METERED RESPIRATORY (INHALATION)
Qty: 11 G | Refills: 0 | Status: SHIPPED | OUTPATIENT
Start: 2021-09-08 | End: 2021-10-09 | Stop reason: SDUPTHER

## 2021-09-10 ENCOUNTER — TELEPHONE (OUTPATIENT)
Dept: FAMILY MEDICINE CLINIC | Facility: CLINIC | Age: 86
End: 2021-09-10

## 2021-09-10 NOTE — TELEPHONE ENCOUNTER
Pt called, needs refill of Zyloprim 100 mg, rheumatology increased it to 2 tab once daily    [facility] DR TABITHA LOVING in [place]

## 2021-10-08 DIAGNOSIS — J45.40 MODERATE PERSISTENT ASTHMA WITHOUT COMPLICATION: ICD-10-CM

## 2021-10-09 ENCOUNTER — NURSE TRIAGE (OUTPATIENT)
Dept: OTHER | Facility: OTHER | Age: 86
End: 2021-10-09

## 2021-10-09 DIAGNOSIS — J45.40 MODERATE PERSISTENT ASTHMA WITHOUT COMPLICATION: ICD-10-CM

## 2021-10-09 RX ORDER — BECLOMETHASONE DIPROPIONATE HFA 80 UG/1
2 AEROSOL, METERED RESPIRATORY (INHALATION) 2 TIMES DAILY
Qty: 11 G | Refills: 0 | Status: SHIPPED | OUTPATIENT
Start: 2021-10-09 | End: 2022-04-15

## 2021-10-11 RX ORDER — BECLOMETHASONE DIPROPIONATE HFA 80 UG/1
AEROSOL, METERED RESPIRATORY (INHALATION)
Qty: 11 G | Refills: 0 | Status: SHIPPED | OUTPATIENT
Start: 2021-10-11 | End: 2021-12-08

## 2021-10-27 ENCOUNTER — OFFICE VISIT (OUTPATIENT)
Dept: FAMILY MEDICINE CLINIC | Facility: CLINIC | Age: 86
End: 2021-10-27
Payer: MEDICARE

## 2021-10-27 VITALS
DIASTOLIC BLOOD PRESSURE: 70 MMHG | WEIGHT: 136 LBS | RESPIRATION RATE: 16 BRPM | BODY MASS INDEX: 27.42 KG/M2 | HEART RATE: 64 BPM | SYSTOLIC BLOOD PRESSURE: 124 MMHG | HEIGHT: 59 IN | TEMPERATURE: 97.4 F

## 2021-10-27 DIAGNOSIS — R21 RASH: Primary | ICD-10-CM

## 2021-10-27 PROCEDURE — 99213 OFFICE O/P EST LOW 20 MIN: CPT | Performed by: FAMILY MEDICINE

## 2021-10-27 RX ORDER — DOXYCYCLINE HYCLATE 100 MG/1
200 CAPSULE ORAL ONCE
Qty: 2 CAPSULE | Refills: 0 | Status: SHIPPED | OUTPATIENT
Start: 2021-10-27 | End: 2021-10-27

## 2021-11-09 ENCOUNTER — OFFICE VISIT (OUTPATIENT)
Dept: FAMILY MEDICINE CLINIC | Facility: CLINIC | Age: 86
End: 2021-11-09
Payer: MEDICARE

## 2021-11-09 VITALS
BODY MASS INDEX: 28.83 KG/M2 | SYSTOLIC BLOOD PRESSURE: 142 MMHG | DIASTOLIC BLOOD PRESSURE: 70 MMHG | WEIGHT: 143 LBS | HEART RATE: 72 BPM | TEMPERATURE: 97.8 F | HEIGHT: 59 IN | RESPIRATION RATE: 14 BRPM

## 2021-11-09 DIAGNOSIS — Z23 NEED FOR VACCINATION: ICD-10-CM

## 2021-11-09 DIAGNOSIS — Z12.31 ENCOUNTER FOR SCREENING MAMMOGRAM FOR MALIGNANT NEOPLASM OF BREAST: ICD-10-CM

## 2021-11-09 DIAGNOSIS — E03.8 OTHER SPECIFIED HYPOTHYROIDISM: ICD-10-CM

## 2021-11-09 DIAGNOSIS — I10 BENIGN ESSENTIAL HYPERTENSION: Primary | ICD-10-CM

## 2021-11-09 DIAGNOSIS — R01.1 HEART MURMUR: ICD-10-CM

## 2021-11-09 DIAGNOSIS — M1A.9XX0 CHRONIC GOUT WITHOUT TOPHUS, UNSPECIFIED CAUSE, UNSPECIFIED SITE: ICD-10-CM

## 2021-11-09 PROCEDURE — 90682 RIV4 VACC RECOMBINANT DNA IM: CPT | Performed by: FAMILY MEDICINE

## 2021-11-09 PROCEDURE — G0008 ADMIN INFLUENZA VIRUS VAC: HCPCS | Performed by: FAMILY MEDICINE

## 2021-11-09 PROCEDURE — 99214 OFFICE O/P EST MOD 30 MIN: CPT | Performed by: FAMILY MEDICINE

## 2021-11-18 DIAGNOSIS — I10 ESSENTIAL HYPERTENSION: ICD-10-CM

## 2021-11-18 RX ORDER — METOPROLOL SUCCINATE 50 MG/1
TABLET, EXTENDED RELEASE ORAL
Qty: 90 TABLET | Refills: 0 | Status: SHIPPED | OUTPATIENT
Start: 2021-11-18 | End: 2022-02-16

## 2021-11-30 ENCOUNTER — HOSPITAL ENCOUNTER (OUTPATIENT)
Dept: NON INVASIVE DIAGNOSTICS | Facility: HOSPITAL | Age: 86
Discharge: HOME | End: 2021-11-30
Attending: FAMILY MEDICINE
Payer: MEDICARE

## 2021-11-30 VITALS
HEIGHT: 59 IN | HEART RATE: 62 BPM | DIASTOLIC BLOOD PRESSURE: 70 MMHG | WEIGHT: 143 LBS | SYSTOLIC BLOOD PRESSURE: 140 MMHG | BODY MASS INDEX: 28.83 KG/M2

## 2021-11-30 DIAGNOSIS — R01.1 HEART MURMUR: ICD-10-CM

## 2021-11-30 PROCEDURE — 93306 TTE W/DOPPLER COMPLETE: CPT

## 2021-12-02 ENCOUNTER — TELEPHONE (OUTPATIENT)
Dept: FAMILY MEDICINE CLINIC | Facility: CLINIC | Age: 86
End: 2021-12-02

## 2021-12-02 LAB
AORTIC ROOT: 2.7 CM
AORTIC VALVE MEAN VELOCITY: 10.9 M/S
APICAL FOUR CHAMBER EJECTION FRACTION: 72 %
AV AREA BY CONTINUOUS VTI: 1.7 CM2
AV AREA PEAK VELOCITY: 1.6 CM2
AV LVOT MEAN GRADIENT: 2 MMHG
AV LVOT PEAK GRADIENT: 3 MMHG
AV MEAN GRADIENT: 5 MMHG
AV PEAK GRADIENT: 9 MMHG
AV VALVE AREA: 1.73 CM2
DOP CALC AO VTI: 37.55 CM
DOP CALC LVOT AREA: 2.83 CM2
DOP CALC LVOT DIAMETER: 1.9 CM
DOP CALC LVOT PEAK VEL VTI: 22.96 CM
DOP CALC LVOT PEAK VEL: 0.85 M/S
DOP CALC LVOT STROKE INDEX: 39.4 ML/M2
DOP CALC LVOT STROKE VOLUME: 65.07 CM3
E WAVE DECELERATION TIME: 350 MS
FRACTIONAL SHORTENING: 42 % (ref 28–44)
INTERVENTRICULAR SEPTUM IN DIASTOLE (PARASTERNAL SHORT AXIS VIEW): 1.1 CM
LEFT ATRIUM AREA SYSTOLE SINGLE PLANE A4C: 19.8 CM2
LEFT ATRIUM SIZE: 4.4 CM
LEFT INTERNAL DIMENSION IN SYSTOLE: 2.5 CM (ref 2.1–4)
LEFT VENTRICULAR INTERNAL DIMENSION IN DIASTOLE: 4.3 CM (ref 3.83–5.7)
LEFT VENTRICULAR POSTERIOR WALL IN END DIASTOLE: 1 CM
LEFT VENTRICULAR STROKE VOLUME: 62 ML
MV E'TISSUE VEL-LAT: 6 CM/S
MV E'TISSUE VEL-SEP: 5 CM/S
MV PEAK A VEL: 0.82 M/S
MV PEAK E VEL: 44 CM/S
MV STENOSIS PRESSURE HALF TIME: 0 MS
RIGHT ATRIUM AREA SYSTOLE A4C: 12.8 CM2
RIGHT VENTRICLE ID DIMENSION: 2.2 CM
SL CV LV EF: 65
SL CV PED ECHO LEFT VENTRICLE DIASTOLIC VOLUME (MOD BIPLANE) 2D: 84 ML
SL CV PED ECHO LEFT VENTRICLE SYSTOLIC VOLUME (MOD BIPLANE) 2D: 23 ML
TR MAX PG: 31 MMHG
TR PEAK VELOCITY: 28 M/S
TRICUSPID VALVE PEAK REGURGITATION VELOCITY: 2.65 M/S
TRICUSPID VALVE S': 0.9 CM/S
TV PEAK GRADIENT: 28 MMHG
Z-SCORE OF LEFT VENTRICULAR DIMENSION IN END SYSTOLE: -0.82

## 2021-12-02 PROCEDURE — 93306 TTE W/DOPPLER COMPLETE: CPT | Performed by: INTERNAL MEDICINE

## 2021-12-06 DIAGNOSIS — R10.13 EPIGASTRIC PAIN: ICD-10-CM

## 2021-12-07 RX ORDER — FAMOTIDINE 40 MG/1
TABLET, FILM COATED ORAL
Qty: 30 TABLET | Refills: 0 | Status: SHIPPED | OUTPATIENT
Start: 2021-12-07 | End: 2022-05-12

## 2021-12-08 DIAGNOSIS — J45.40 MODERATE PERSISTENT ASTHMA WITHOUT COMPLICATION: ICD-10-CM

## 2021-12-08 RX ORDER — BECLOMETHASONE DIPROPIONATE HFA 80 UG/1
AEROSOL, METERED RESPIRATORY (INHALATION)
Qty: 11 G | Refills: 0 | Status: SHIPPED | OUTPATIENT
Start: 2021-12-08 | End: 2022-01-10

## 2021-12-23 ENCOUNTER — HOSPITAL ENCOUNTER (OUTPATIENT)
Dept: RADIOLOGY | Facility: HOSPITAL | Age: 86
Discharge: HOME | End: 2021-12-23
Attending: FAMILY MEDICINE
Payer: MEDICARE

## 2021-12-23 VITALS — HEIGHT: 59 IN | BODY MASS INDEX: 27.42 KG/M2 | WEIGHT: 136 LBS

## 2021-12-23 DIAGNOSIS — Z12.31 ENCOUNTER FOR SCREENING MAMMOGRAM FOR MALIGNANT NEOPLASM OF BREAST: ICD-10-CM

## 2021-12-23 PROCEDURE — 77063 BREAST TOMOSYNTHESIS BI: CPT

## 2021-12-23 PROCEDURE — 77067 SCR MAMMO BI INCL CAD: CPT

## 2021-12-29 ENCOUNTER — TELEPHONE (OUTPATIENT)
Dept: FAMILY MEDICINE CLINIC | Facility: CLINIC | Age: 86
End: 2021-12-29

## 2022-02-03 ENCOUNTER — OFFICE VISIT (OUTPATIENT)
Dept: FAMILY MEDICINE CLINIC | Facility: CLINIC | Age: 87
End: 2022-02-03
Payer: MEDICARE

## 2022-02-03 VITALS
WEIGHT: 136.2 LBS | RESPIRATION RATE: 16 BRPM | DIASTOLIC BLOOD PRESSURE: 60 MMHG | TEMPERATURE: 97.5 F | SYSTOLIC BLOOD PRESSURE: 120 MMHG | HEIGHT: 59 IN | HEART RATE: 60 BPM | BODY MASS INDEX: 27.46 KG/M2

## 2022-02-03 DIAGNOSIS — R21 RASH: Primary | ICD-10-CM

## 2022-02-03 PROCEDURE — 99213 OFFICE O/P EST LOW 20 MIN: CPT | Performed by: FAMILY MEDICINE

## 2022-02-03 RX ORDER — CLOTRIMAZOLE AND BETAMETHASONE DIPROPIONATE 10; .64 MG/G; MG/G
CREAM TOPICAL 2 TIMES DAILY
Qty: 30 G | Refills: 0 | Status: SHIPPED | OUTPATIENT
Start: 2022-02-03 | End: 2022-04-15

## 2022-02-03 NOTE — PROGRESS NOTES
Chief Complaint   Patient presents with    Rash     Pt c/o rash on legs and breast x 1 month        Patient ID: Dayana Edwards is a 80 y o  female  HPI  Pt is seeing for not itchy rash started started 1 m ago  -  Has a patch on R breast, L forearm, both shins ) 2 patches on each LE  -  No therapy tried   -  No new meds/detergents  or chemical exposure     The following portions of the patient's history were reviewed and updated as appropriate: allergies, current medications, past family history, past medical history, past social history, past surgical history and problem list     Review of Systems   Constitutional: Negative  Respiratory: Negative  Cardiovascular: Negative  Gastrointestinal: Negative  Genitourinary: Negative  Musculoskeletal: Negative  Neurological: Negative          Current Outpatient Medications   Medication Sig Dispense Refill    albuterol (PROAIR HFA) 90 mcg/act inhaler Inhale 2 puffs every 6 (six) hours as needed for wheezing 1 Inhaler 2    allopurinol (ZYLOPRIM) 100 mg tablet Take 2 tablets (200 mg total) by mouth daily 180 tablet 1    amLODIPine (NORVASC) 5 mg tablet Take 1 tablet by mouth twice daily 60 tablet 6    beclomethasone (Qvar RediHaler) 80 MCG/ACT inhaler Inhale 2 puffs 2 (two) times a day Rinse mouth after use 11 g 0    cycloSPORINE (RESTASIS) 0 05 % ophthalmic emulsion Administer 1 drop to both eyes 2 (two) times a day      famotidine (PEPCID) 40 MG tablet Take 1 tablet by mouth once daily 30 tablet 0    levothyroxine 50 mcg tablet Take 1 tablet (50 mcg total) by mouth daily 90 tablet 3    metoprolol succinate (TOPROL-XL) 50 mg 24 hr tablet TAKE 1 TABLET BY MOUTH ONCE DAILY AT BEDTIME 90 tablet 0    rosuvastatin (CRESTOR) 5 mg tablet Take 1 tablet (5 mg total) by mouth daily 90 tablet 3    telmisartan-hydrochlorothiazide (MICARDIS HCT) 80-12 5 MG per tablet Take 1 tablet by mouth once daily 90 tablet 1    colchicine (COLCRYS) 0 6 mg tablet Take 1 tablet by mouth daily      fenofibrate (TRICOR) 145 mg tablet TAKE 1 TABLET BY MOUTH ONCE DAILY WITH FOOD 30 tablet 11    Qvar RediHaler 80 MCG/ACT inhaler INHALE 2 PUFFS BY MOUTH TWICE DAILY RINSE MOUTH AFTER USE (Patient not taking: Reported on 2/3/2022) 11 g 3     No current facility-administered medications for this visit  Objective:    /60 (BP Location: Left arm, Patient Position: Sitting, Cuff Size: Standard)   Pulse 60   Temp 97 5 °F (36 4 °C)   Resp 16   Ht 4' 11" (1 499 m)   Wt 61 8 kg (136 lb 3 2 oz)   BMI 27 51 kg/m²        Physical Exam  Constitutional:       Appearance: She is not ill-appearing  Cardiovascular:      Rate and Rhythm: Normal rate  Musculoskeletal:      Right lower leg: No edema  Left lower leg: No edema  Skin:     Findings: Rash present  Rash is scaling  Comments: Multiple patches 1-2 cm in d, large on R breast 2 cm in d  -  Smaller ones on R shin, L shin, L forearm, R hand  -  Total 7    Neurological:      Mental Status: She is alert and oriented to person, place, and time                   Assessment/Plan:         Diagnoses and all orders for this visit:    Rash  -     clotrimazole-betamethasone (LOTRISONE) 1-0 05 % cream; Apply topically 2 (two) times a day      not clear about rash appearance  -  Possible Pityiriasis rosea           rto prn             Barry Soni MD

## 2022-04-04 ENCOUNTER — APPOINTMENT (OUTPATIENT)
Dept: LAB | Facility: CLINIC | Age: 87
End: 2022-04-04
Payer: MEDICARE

## 2022-04-04 DIAGNOSIS — E03.8 OTHER SPECIFIED HYPOTHYROIDISM: ICD-10-CM

## 2022-04-04 DIAGNOSIS — M1A.9XX0 CHRONIC GOUT WITHOUT TOPHUS, UNSPECIFIED CAUSE, UNSPECIFIED SITE: ICD-10-CM

## 2022-04-04 DIAGNOSIS — I10 BENIGN ESSENTIAL HYPERTENSION: ICD-10-CM

## 2022-04-04 LAB
ALBUMIN SERPL BCP-MCNC: 3.6 G/DL (ref 3.5–5)
ALP SERPL-CCNC: 105 U/L (ref 46–116)
ALT SERPL W P-5'-P-CCNC: 32 U/L (ref 12–78)
ANION GAP SERPL CALCULATED.3IONS-SCNC: 6 MMOL/L (ref 4–13)
AST SERPL W P-5'-P-CCNC: 21 U/L (ref 5–45)
BILIRUB SERPL-MCNC: 0.59 MG/DL (ref 0.2–1)
BUN SERPL-MCNC: 26 MG/DL (ref 5–25)
CALCIUM SERPL-MCNC: 9.3 MG/DL (ref 8.3–10.1)
CHLORIDE SERPL-SCNC: 107 MMOL/L (ref 100–108)
CHOLEST SERPL-MCNC: 178 MG/DL
CO2 SERPL-SCNC: 24 MMOL/L (ref 21–32)
CREAT SERPL-MCNC: 1.12 MG/DL (ref 0.6–1.3)
GFR SERPL CREATININE-BSD FRML MDRD: 43 ML/MIN/1.73SQ M
GLUCOSE P FAST SERPL-MCNC: 114 MG/DL (ref 65–99)
HDLC SERPL-MCNC: 51 MG/DL
LDLC SERPL CALC-MCNC: 69 MG/DL (ref 0–100)
NONHDLC SERPL-MCNC: 127 MG/DL
POTASSIUM SERPL-SCNC: 4 MMOL/L (ref 3.5–5.3)
PROT SERPL-MCNC: 6.8 G/DL (ref 6.4–8.2)
SODIUM SERPL-SCNC: 137 MMOL/L (ref 136–145)
TRIGL SERPL-MCNC: 289 MG/DL
TSH SERPL DL<=0.05 MIU/L-ACNC: 2.73 UIU/ML (ref 0.45–4.5)
URATE SERPL-MCNC: 6.2 MG/DL (ref 2–6.8)

## 2022-04-04 PROCEDURE — 84550 ASSAY OF BLOOD/URIC ACID: CPT

## 2022-04-04 PROCEDURE — 80061 LIPID PANEL: CPT

## 2022-04-04 PROCEDURE — 80053 COMPREHEN METABOLIC PANEL: CPT

## 2022-04-04 PROCEDURE — 36415 COLL VENOUS BLD VENIPUNCTURE: CPT

## 2022-04-04 PROCEDURE — 84443 ASSAY THYROID STIM HORMONE: CPT

## 2022-04-06 ENCOUNTER — TELEPHONE (OUTPATIENT)
Dept: FAMILY MEDICINE CLINIC | Facility: CLINIC | Age: 87
End: 2022-04-06

## 2022-04-06 NOTE — TELEPHONE ENCOUNTER
TSH and uric acid within normal   Total chol in range but triglycerides went up--could consider adding tricor or zetia to the rosuvastatin  Also WQR=755 (pt w prediabetes in past)  Last ojq2f=6 9 % last yr  Would schedule follow-up w Dr Sharla Moreno when she returns to address above

## 2022-04-11 ENCOUNTER — RA CDI HCC (OUTPATIENT)
Dept: OTHER | Facility: HOSPITAL | Age: 87
End: 2022-04-11

## 2022-04-11 NOTE — PROGRESS NOTES
[address]  coding opportunities       Chart reviewed, no opportunity found: CHART REVIEWED, NO OPPORTUNITY FOUND        Patients Insurance     Medicare Insurance: Medicare

## 2022-04-15 ENCOUNTER — OFFICE VISIT (OUTPATIENT)
Dept: FAMILY MEDICINE CLINIC | Facility: CLINIC | Age: 87
End: 2022-04-15
Payer: MEDICARE

## 2022-04-15 VITALS
SYSTOLIC BLOOD PRESSURE: 130 MMHG | HEIGHT: 59 IN | HEART RATE: 66 BPM | BODY MASS INDEX: 27.42 KG/M2 | TEMPERATURE: 97.7 F | RESPIRATION RATE: 20 BRPM | DIASTOLIC BLOOD PRESSURE: 58 MMHG | WEIGHT: 136 LBS

## 2022-04-15 DIAGNOSIS — E78.1 HYPERTRIGLYCERIDEMIA: ICD-10-CM

## 2022-04-15 DIAGNOSIS — R73.01 IMPAIRED FASTING GLUCOSE: Primary | ICD-10-CM

## 2022-04-15 PROCEDURE — 99213 OFFICE O/P EST LOW 20 MIN: CPT | Performed by: FAMILY MEDICINE

## 2022-04-15 NOTE — PROGRESS NOTES
Chief Complaint   Patient presents with    Follow-up     f/u lab review        Patient ID: Haroon Palomares is a 80 y o  female  HPI  Pt is seeing for f/u IFG and hyperlipidemia -  On Crestor - total cholesterol is at goal but worsening triglyceride level -  admits overeating carbs recently     The following portions of the patient's history were reviewed and updated as appropriate: allergies, current medications, past family history, past medical history, past social history, past surgical history and problem list     Review of Systems   Constitutional: Negative  Respiratory: Negative  Cardiovascular: Negative  Gastrointestinal: Negative  Genitourinary: Negative  Musculoskeletal: Negative  Skin: Negative  Neurological: Negative  Current Outpatient Medications   Medication Sig Dispense Refill    albuterol (PROAIR HFA) 90 mcg/act inhaler Inhale 2 puffs every 6 (six) hours as needed for wheezing 1 Inhaler 2    allopurinol (ZYLOPRIM) 100 mg tablet Take 2 tablets by mouth once daily (Patient taking differently: 200 mg in the morning  ) 60 tablet 6    amLODIPine (NORVASC) 5 mg tablet Take 1 tablet by mouth twice daily (Patient taking differently: 5 mg daily  ) 60 tablet 6    cycloSPORINE (RESTASIS) 0 05 % ophthalmic emulsion Administer 1 drop to both eyes 2 (two) times a day      famotidine (PEPCID) 40 MG tablet Take 1 tablet by mouth once daily 30 tablet 0    levothyroxine 50 mcg tablet Take 1 tablet (50 mcg total) by mouth daily 90 tablet 3    metoprolol succinate (TOPROL-XL) 50 mg 24 hr tablet TAKE 1 TABLET BY MOUTH ONCE DAILY AT BEDTIME 90 tablet 1    rosuvastatin (CRESTOR) 5 mg tablet Take 1 tablet (5 mg total) by mouth daily 90 tablet 3    telmisartan-hydrochlorothiazide (MICARDIS HCT) 80-12 5 MG per tablet Take 1 tablet by mouth once daily 90 tablet 1     No current facility-administered medications for this visit         Objective:    /58 (BP Location: Left arm, Patient Position: Sitting, Cuff Size: Standard)   Pulse 66   Temp 97 7 °F (36 5 °C)   Resp 20   Ht 4' 11" (1 499 m)   Wt 61 7 kg (136 lb)   BMI 27 47 kg/m²        Physical Exam  Constitutional:       Appearance: She is not ill-appearing  Cardiovascular:      Rate and Rhythm: Normal rate and regular rhythm  Heart sounds: Murmur heard  Pulmonary:      Effort: Pulmonary effort is normal  No respiratory distress  Musculoskeletal:      Right lower leg: No edema  Left lower leg: No edema  Neurological:      General: No focal deficit present  Mental Status: She is alert and oriented to person, place, and time  Psychiatric:         Mood and Affect: Mood normal            Labs in chart were reviewed    Recent Results (from the past 672 hour(s))   Comprehensive metabolic panel    Collection Time: 04/04/22  9:17 AM   Result Value Ref Range    Sodium 137 136 - 145 mmol/L    Potassium 4 0 3 5 - 5 3 mmol/L    Chloride 107 100 - 108 mmol/L    CO2 24 21 - 32 mmol/L    ANION GAP 6 4 - 13 mmol/L    BUN 26 (H) 5 - 25 mg/dL    Creatinine 1 12 0 60 - 1 30 mg/dL    Glucose, Fasting 114 (H) 65 - 99 mg/dL    Calcium 9 3 8 3 - 10 1 mg/dL    AST 21 5 - 45 U/L    ALT 32 12 - 78 U/L    Alkaline Phosphatase 105 46 - 116 U/L    Total Protein 6 8 6 4 - 8 2 g/dL    Albumin 3 6 3 5 - 5 0 g/dL    Total Bilirubin 0 59 0 20 - 1 00 mg/dL    eGFR 43 ml/min/1 73sq m   Lipid panel    Collection Time: 04/04/22  9:17 AM   Result Value Ref Range    Cholesterol 178 See Comment mg/dL    Triglycerides 289 (H) See Comment mg/dL    HDL, Direct 51 >=50 mg/dL    LDL Calculated 69 0 - 100 mg/dL    Non-HDL-Chol (CHOL-HDL) 127 mg/dl   Uric acid    Collection Time: 04/04/22  9:17 AM   Result Value Ref Range    Uric Acid 6 2 2 0 - 6 8 mg/dL   TSH, 3rd generation    Collection Time: 04/04/22  9:17 AM   Result Value Ref Range    TSH 3RD GENERATON 2 730 0 450 - 4 500 uIU/mL       Assessment/Plan:         Diagnoses and all orders for this visit:    Impaired fasting glucose  -     Comprehensive metabolic panel; Future  -     Hemoglobin A1C; Future    Low carbs diet   Hypertriglyceridemia  -     Comprehensive metabolic panel; Future  -     Lipid panel;  Future  Will add fish oil 1g BID OTC         rto in 3 m                   Chiquis Carrion MD

## 2022-04-18 DIAGNOSIS — I10 ESSENTIAL HYPERTENSION: ICD-10-CM

## 2022-04-18 RX ORDER — TELMISARTAN AND HYDROCHLORTHIAZIDE 80; 12.5 MG/1; MG/1
TABLET ORAL
Qty: 90 TABLET | Refills: 0 | Status: SHIPPED | OUTPATIENT
Start: 2022-04-18 | End: 2022-07-21

## 2022-04-27 ENCOUNTER — APPOINTMENT (OUTPATIENT)
Dept: RADIOLOGY | Facility: CLINIC | Age: 87
End: 2022-04-27
Payer: MEDICARE

## 2022-04-27 PROCEDURE — 71046 X-RAY EXAM CHEST 2 VIEWS: CPT

## 2022-05-12 ENCOUNTER — TELEPHONE (OUTPATIENT)
Dept: FAMILY MEDICINE CLINIC | Facility: CLINIC | Age: 87
End: 2022-05-12

## 2022-05-12 DIAGNOSIS — R10.13 EPIGASTRIC PAIN: ICD-10-CM

## 2022-05-12 RX ORDER — FAMOTIDINE 40 MG/1
TABLET, FILM COATED ORAL
Qty: 30 TABLET | Refills: 0 | Status: SHIPPED | OUTPATIENT
Start: 2022-05-12

## 2022-05-12 NOTE — TELEPHONE ENCOUNTER
Patient requests refill for QvarRediHaler 80m sent to [facility]  Only has 6 puffs left

## 2022-07-08 ENCOUNTER — OFFICE VISIT (OUTPATIENT)
Dept: PODIATRY | Facility: CLINIC | Age: 87
End: 2022-07-08
Payer: MEDICARE

## 2022-07-08 VITALS
WEIGHT: 136 LBS | DIASTOLIC BLOOD PRESSURE: 58 MMHG | BODY MASS INDEX: 27.42 KG/M2 | HEIGHT: 59 IN | SYSTOLIC BLOOD PRESSURE: 130 MMHG | RESPIRATION RATE: 17 BRPM

## 2022-07-08 DIAGNOSIS — M25.571 ARTHRALGIA OF RIGHT FOOT: Primary | ICD-10-CM

## 2022-07-08 DIAGNOSIS — M20.11 HALLUX VALGUS OF RIGHT FOOT: ICD-10-CM

## 2022-07-08 DIAGNOSIS — M54.16 RADICULOPATHY OF LUMBAR REGION: ICD-10-CM

## 2022-07-08 PROCEDURE — 99212 OFFICE O/P EST SF 10 MIN: CPT | Performed by: PODIATRIST

## 2022-07-08 PROCEDURE — 20605 DRAIN/INJ JOINT/BURSA W/O US: CPT | Performed by: PODIATRIST

## 2022-07-08 RX ORDER — GABAPENTIN 100 MG/1
100 CAPSULE ORAL
Qty: 30 CAPSULE | Refills: 0 | Status: SHIPPED | OUTPATIENT
Start: 2022-07-08 | End: 2022-08-31

## 2022-07-08 NOTE — PROGRESS NOTES
Assessment/Plan:  Inflamed hallux valgus deformity with arthralgia right foot  Radiculopathy    Plan  Foot exam performed  Patient advised on condition  Today 1 cc Kenalog 10 injected into right 1st MPJ without pain or complication  She will take Tylenol p r n     We will add gabapentin  Return for follow-up         Diagnoses and all orders for this visit:    Arthralgia of right foot    Hallux valgus of right foot    Radiculopathy of lumbar region  -     gabapentin (NEURONTIN) 100 mg capsule; Take 1 capsule (100 mg total) by mouth daily at bedtime          Subjective:   Patient has complaint of pain  She has pain in her right bunion  No history of trauma  Patient also has chronic low back pain and sciatica of the right lower extremity  Allergies   Allergen Reactions    Atorvastatin      Other reaction(s): Leg Cramps  Reaction Date: [id number];     Azithromycin      Other reaction(s): Unknown Allergic Reaction  Reaction Date: [id number]; Annotation - [id number]: jw    Codeine Nausea Only     Reaction Date: [id number]; Annotation - [id number]: jw    Moxifloxacin      Other reaction(s): Diarrhea    Penicillins Rash     Reaction Date: [id number]; Annotation - [id number]: jw         Current Outpatient Medications:     gabapentin (NEURONTIN) 100 mg capsule, Take 1 capsule (100 mg total) by mouth daily at bedtime, Disp: 30 capsule, Rfl: 0    albuterol (PROAIR HFA) 90 mcg/act inhaler, Inhale 2 puffs every 6 (six) hours as needed for wheezing, Disp: 1 Inhaler, Rfl: 2    allopurinol (ZYLOPRIM) 100 mg tablet, Take 2 tablets by mouth once daily (Patient taking differently: 200 mg in the morning  ), Disp: 60 tablet, Rfl: 6    amLODIPine (NORVASC) 5 mg tablet, Take 1 tablet by mouth twice daily (Patient taking differently: 5 mg daily  ), Disp: 60 tablet, Rfl: 6    beclomethasone (Qvar RediHaler) 80 MCG/ACT inhaler, Inhale 2 puffs  in the morning and 2 puffs in the evening  Rinse mouth after use   , Disp: 10 6 g, Rfl: 6    cycloSPORINE (RESTASIS) 0 05 % ophthalmic emulsion, Administer 1 drop to both eyes 2 (two) times a day, Disp: , Rfl:     famotidine (PEPCID) 40 MG tablet, Take 1 tablet by mouth once daily, Disp: 30 tablet, Rfl: 0    levothyroxine 50 mcg tablet, Take 1 tablet (50 mcg total) by mouth daily, Disp: 90 tablet, Rfl: 3    metoprolol succinate (TOPROL-XL) 50 mg 24 hr tablet, TAKE 1 TABLET BY MOUTH ONCE DAILY AT BEDTIME, Disp: 90 tablet, Rfl: 1    rosuvastatin (CRESTOR) 5 mg tablet, Take 1 tablet (5 mg total) by mouth daily, Disp: 90 tablet, Rfl: 3    telmisartan-hydrochlorothiazide (MICARDIS HCT) 80-12 5 MG per tablet, Take 1 tablet by mouth once daily, Disp: 90 tablet, Rfl: 0    Patient Active Problem List   Diagnosis    Asthma    Benign essential hypertension    Chronic kidney disease, stage II (mild)    Mixed hyperlipidemia    Osteoporosis    Other specified hypothyroidism    Abnormal CT of the abdomen    Epigastric pain    Abdominal bloating    Delayed emergence from anesthesia    Back pain    Lipoma of back    Heart murmur          Patient ID: Chet Castellon is a 80 y o  female  HPI    The following portions of the patient's history were reviewed and updated as appropriate:     family history includes Aneurysm in her sister; Breast cancer (age of onset: [de-identified]) in her sister; Cancer in her daughter; Heart disease in her brother, brother, brother, daughter, father, and sister; Kidney disease in her sister; No Known Problems in her maternal aunt, maternal aunt, maternal aunt, paternal aunt, and paternal aunt  reports that she has never smoked  She has never used smokeless tobacco  She reports previous alcohol use  She reports that she does not use drugs  Vitals:    07/08/22 1343   BP: 130/58   Resp: 17       Review of Systems      Objective:  Patient's shoes and socks removed     Foot ExamPhysical Exam        General  General Appearance: appears stated age and healthy   Orientation: alert and oriented to person, place, and time   Affect: appropriate   Gait: antalgic         Right Foot/Ankle      Inspection and Palpation  Ecchymosis: none  Tenderness: bony tenderness    Pain patient has inflamed bunion  No evidence of a infection or gout  Swelling: dorsum   Arch: pes cavus  Claw Toes: second toe  Hallux valgus: no  Hallux limitus: yes  Skin Exam: callus and dry skin;      Neurovascular  Dorsalis pedis: 3+  Posterior tibial: 3+  Saphenous nerve sensation: normal  Tibial nerve sensation: normal  Superficial peroneal nerve sensation: normal  Deep peroneal nerve sensation: normal  Sural nerve sensation: normal        Left Foot/Ankle       Inspection and Palpation  Ecchymosis: none  Swelling: dorsum   Arch: pes planus  Hallux valgus: no  Hallux limitus: yes  Skin Exam: callus and dry skin;      Neurovascular  Dorsalis pedis: 3+  Posterior tibial: 3+  Saphenous nerve sensation: normal  Tibial nerve sensation: normal  Superficial peroneal nerve sensation: normal  Deep peroneal nerve sensation: normal  Sural nerve sensation: normal           Physical Exam  Vitals signs and nursing note reviewed  Constitutional:       Appearance: Normal appearance  Cardiovascular:      Pulses:           Dorsalis pedis pulses are 3+ on the right side and 3+ on the left side         Posterior tibial pulses are 3+ on the right side and 3+ on the left side  Musculoskeletal:      Right foot: Bony tenderness present  No bunion       Left foot: No bunion       Comments: X-ray of right foot demonstrates no evidence of fracture osteomyelitis   However middle phalanx of 2nd right toe demonstrates cystic change consistent with interosseous tophaceous change    Feet:      Right foot:      Skin integrity: Callus and dry skin present       Left foot:      Skin integrity: Callus and dry skin present  Skin:     General: Skin is warm  Patient has 4/5 L5 testing

## 2022-07-20 DIAGNOSIS — I10 ESSENTIAL HYPERTENSION: ICD-10-CM

## 2022-07-21 RX ORDER — TELMISARTAN AND HYDROCHLORTHIAZIDE 80; 12.5 MG/1; MG/1
TABLET ORAL
Qty: 90 TABLET | Refills: 0 | Status: SHIPPED | OUTPATIENT
Start: 2022-07-21 | End: 2022-10-17

## 2022-07-27 ENCOUNTER — APPOINTMENT (OUTPATIENT)
Dept: LAB | Facility: CLINIC | Age: 87
End: 2022-07-27
Payer: MEDICARE

## 2022-07-27 DIAGNOSIS — E78.1 HYPERTRIGLYCERIDEMIA: ICD-10-CM

## 2022-07-27 DIAGNOSIS — R73.01 IMPAIRED FASTING GLUCOSE: ICD-10-CM

## 2022-07-27 LAB
ALBUMIN SERPL BCP-MCNC: 3.4 G/DL (ref 3.5–5)
ALP SERPL-CCNC: 95 U/L (ref 46–116)
ALT SERPL W P-5'-P-CCNC: 27 U/L (ref 12–78)
ANION GAP SERPL CALCULATED.3IONS-SCNC: 6 MMOL/L (ref 4–13)
AST SERPL W P-5'-P-CCNC: 20 U/L (ref 5–45)
BILIRUB SERPL-MCNC: 1.21 MG/DL (ref 0.2–1)
BUN SERPL-MCNC: 30 MG/DL (ref 5–25)
CALCIUM ALBUM COR SERPL-MCNC: 10.6 MG/DL (ref 8.3–10.1)
CALCIUM SERPL-MCNC: 10.1 MG/DL (ref 8.3–10.1)
CHLORIDE SERPL-SCNC: 109 MMOL/L (ref 96–108)
CHOLEST SERPL-MCNC: 181 MG/DL
CO2 SERPL-SCNC: 26 MMOL/L (ref 21–32)
CREAT SERPL-MCNC: 1.22 MG/DL (ref 0.6–1.3)
EST. AVERAGE GLUCOSE BLD GHB EST-MCNC: 134 MG/DL
GFR SERPL CREATININE-BSD FRML MDRD: 39 ML/MIN/1.73SQ M
GLUCOSE P FAST SERPL-MCNC: 118 MG/DL (ref 65–99)
HBA1C MFR BLD: 6.3 %
HDLC SERPL-MCNC: 53 MG/DL
LDLC SERPL CALC-MCNC: 87 MG/DL (ref 0–100)
NONHDLC SERPL-MCNC: 128 MG/DL
POTASSIUM SERPL-SCNC: 4.2 MMOL/L (ref 3.5–5.3)
PROT SERPL-MCNC: 6.9 G/DL (ref 6.4–8.4)
SODIUM SERPL-SCNC: 141 MMOL/L (ref 135–147)
TRIGL SERPL-MCNC: 204 MG/DL

## 2022-07-27 PROCEDURE — 80061 LIPID PANEL: CPT

## 2022-07-27 PROCEDURE — 36415 COLL VENOUS BLD VENIPUNCTURE: CPT

## 2022-07-27 PROCEDURE — 83036 HEMOGLOBIN GLYCOSYLATED A1C: CPT

## 2022-07-27 PROCEDURE — 80053 COMPREHEN METABOLIC PANEL: CPT

## 2022-08-05 ENCOUNTER — OFFICE VISIT (OUTPATIENT)
Dept: PODIATRY | Facility: CLINIC | Age: 87
End: 2022-08-05
Payer: MEDICARE

## 2022-08-05 ENCOUNTER — HOSPITAL ENCOUNTER (OUTPATIENT)
Dept: RADIOLOGY | Facility: HOSPITAL | Age: 87
Discharge: HOME | End: 2022-08-05
Payer: MEDICARE

## 2022-08-05 VITALS
HEIGHT: 59 IN | RESPIRATION RATE: 17 BRPM | DIASTOLIC BLOOD PRESSURE: 58 MMHG | BODY MASS INDEX: 27.42 KG/M2 | SYSTOLIC BLOOD PRESSURE: 130 MMHG | WEIGHT: 136 LBS

## 2022-08-05 DIAGNOSIS — M77.41 METATARSALGIA OF BOTH FEET: Primary | ICD-10-CM

## 2022-08-05 DIAGNOSIS — M20.11 HALLUX VALGUS OF RIGHT FOOT: ICD-10-CM

## 2022-08-05 DIAGNOSIS — M25.571 ARTHRALGIA OF RIGHT FOOT: ICD-10-CM

## 2022-08-05 DIAGNOSIS — M77.42 METATARSALGIA OF BOTH FEET: Primary | ICD-10-CM

## 2022-08-05 DIAGNOSIS — M54.16 RADICULOPATHY OF LUMBAR REGION: ICD-10-CM

## 2022-08-05 PROCEDURE — 99213 OFFICE O/P EST LOW 20 MIN: CPT | Performed by: PODIATRIST

## 2022-08-05 PROCEDURE — 72110 X-RAY EXAM L-2 SPINE 4/>VWS: CPT

## 2022-08-05 RX ORDER — GABAPENTIN 100 MG/1
100 CAPSULE ORAL 2 TIMES DAILY
Qty: 60 CAPSULE | Refills: 0 | Status: SHIPPED | OUTPATIENT
Start: 2022-08-05 | End: 2022-08-31 | Stop reason: SDUPTHER

## 2022-08-05 NOTE — PROGRESS NOTES
Assessment/Plan:  Metatarsalgia secondary to radiculopathy  Hallux valgus deformity right foot  Plan  Foot exam performed  Patient advised on condition  We will increase gabapentin dosing to 100 mg b i d  Spine x-rays ordered  Patient will follow-up with chiropractic  Arthrocentesis right 1st MPJ p r n  Diagnoses and all orders for this visit:    Metatarsalgia of both feet  -     gabapentin (NEURONTIN) 100 mg capsule; Take 1 capsule (100 mg total) by mouth 2 (two) times a day    Radiculopathy of lumbar region  -     XR spine lumbar complete w bending minimum 6 views; Future  -     gabapentin (NEURONTIN) 100 mg capsule; Take 1 capsule (100 mg total) by mouth 2 (two) times a day    Hallux valgus of right foot    Arthralgia of right foot          Subjective:  Patient is doing much better  She has less back and leg pain  She has left bunion pain  Allergies   Allergen Reactions    Atorvastatin      Other reaction(s): Leg Cramps  Reaction Date: [id number];     Azithromycin      Other reaction(s): Unknown Allergic Reaction  Reaction Date: [id number]; Annotation - [id number]: jjw    Codeine Nausea Only     Reaction Date: [id number]; Annotation - [id number]: jjw    Moxifloxacin      Other reaction(s): Diarrhea    Penicillins Rash     Reaction Date: [id number];  Annotation - [id number]: jw         Current Outpatient Medications:     gabapentin (NEURONTIN) 100 mg capsule, Take 1 capsule (100 mg total) by mouth 2 (two) times a day, Disp: 60 capsule, Rfl: 0    albuterol (PROAIR HFA) 90 mcg/act inhaler, Inhale 2 puffs every 6 (six) hours as needed for wheezing, Disp: 1 Inhaler, Rfl: 2    allopurinol (ZYLOPRIM) 100 mg tablet, Take 2 tablets by mouth once daily (Patient taking differently: 200 mg in the morning  ), Disp: 60 tablet, Rfl: 6    amLODIPine (NORVASC) 5 mg tablet, Take 1 tablet by mouth twice daily (Patient taking differently: 5 mg daily  ), Disp: 60 tablet, Rfl: 6    beclomethasone (Qvar RediHaler) 80 MCG/ACT inhaler, Inhale 2 puffs  in the morning and 2 puffs in the evening  Rinse mouth after use   , Disp: 10 6 g, Rfl: 6    cycloSPORINE (RESTASIS) 0 05 % ophthalmic emulsion, Administer 1 drop to both eyes 2 (two) times a day, Disp: , Rfl:     famotidine (PEPCID) 40 MG tablet, Take 1 tablet by mouth once daily, Disp: 30 tablet, Rfl: 0    gabapentin (NEURONTIN) 100 mg capsule, Take 1 capsule (100 mg total) by mouth daily at bedtime, Disp: 30 capsule, Rfl: 0    levothyroxine 50 mcg tablet, Take 1 tablet (50 mcg total) by mouth daily, Disp: 90 tablet, Rfl: 3    metoprolol succinate (TOPROL-XL) 50 mg 24 hr tablet, TAKE 1 TABLET BY MOUTH ONCE DAILY AT BEDTIME, Disp: 90 tablet, Rfl: 1    rosuvastatin (CRESTOR) 5 mg tablet, Take 1 tablet by mouth once daily, Disp: 30 tablet, Rfl: 6    telmisartan-hydrochlorothiazide (MICARDIS HCT) 80-12 5 MG per tablet, Take 1 tablet by mouth once daily, Disp: 90 tablet, Rfl: 0    Patient Active Problem List   Diagnosis    Asthma    Benign essential hypertension    Chronic kidney disease, stage II (mild)    Mixed hyperlipidemia    Osteoporosis    Other specified hypothyroidism    Abnormal CT of the abdomen    Epigastric pain    Abdominal bloating    Delayed emergence from anesthesia    Back pain    Lipoma of back    Heart murmur          Patient ID: Nancy Lisa is a 80 y o  female  HPI    The following portions of the patient's history were reviewed and updated as appropriate:     family history includes Aneurysm in her sister; Breast cancer (age of onset: [de-identified]) in her sister; Cancer in her daughter; Heart disease in her brother, brother, brother, daughter, father, and sister; Kidney disease in her sister; No Known Problems in her maternal aunt, maternal aunt, maternal aunt, paternal aunt, and paternal aunt  reports that she has never smoked  She has never used smokeless tobacco  She reports previous alcohol use   She reports that she does not use drugs  Vitals:    08/05/22 0916   BP: 130/58   Resp: 17       Review of Systems      Objective:  Patient's shoes and socks removed  Foot ExamPhysical Exam        General  General Appearance: appears stated age and healthy   Orientation: alert and oriented to person, place, and time   Affect: appropriate   Gait: antalgic         Right Foot/Ankle      Inspection and Palpation  Ecchymosis: none  Tenderness: bony tenderness    Pain patient has inflamed bunion  No evidence of a infection or gout  Swelling: dorsum   Arch: pes cavus  Claw Toes: second toe  Hallux valgus: no  Hallux limitus: yes  Skin Exam: callus and dry skin;      Neurovascular  Dorsalis pedis: 3+  Posterior tibial: 3+  Saphenous nerve sensation: normal  Tibial nerve sensation: normal  Superficial peroneal nerve sensation: normal  Deep peroneal nerve sensation: normal  Sural nerve sensation: normal        Left Foot/Ankle       Inspection and Palpation  Ecchymosis: none  Swelling: dorsum   Arch: pes planus  Hallux valgus: no  Hallux limitus: yes  Skin Exam: callus and dry skin;      Neurovascular  Dorsalis pedis: 3+  Posterior tibial: 3+  Saphenous nerve sensation: normal  Tibial nerve sensation: normal  Superficial peroneal nerve sensation: normal  Deep peroneal nerve sensation: normal  Sural nerve sensation: normal           Physical Exam  Vitals signs and nursing note reviewed  Constitutional:       Appearance: Normal appearance  Cardiovascular:      Pulses:           Dorsalis pedis pulses are 3+ on the right side and 3+ on the left side         Posterior tibial pulses are 3+ on the right side and 3+ on the left side  Musculoskeletal:      Right foot: Bony tenderness present   No bunion       Left foot: No bunion       Comments: X-ray of right foot demonstrates no evidence of fracture osteomyelitis   However middle phalanx of 2nd right toe demonstrates cystic change consistent with interosseous tophaceous change    Feet:      Right foot:    Skin integrity: Callus and dry skin present       Left foot:      Skin integrity: Callus and dry skin present     Skin:     General: Skin is warm       Patient has 4/5 L5 testing

## 2022-08-25 ENCOUNTER — RA CDI HCC (OUTPATIENT)
Dept: OTHER | Facility: HOSPITAL | Age: 87
End: 2022-08-25

## 2022-08-31 ENCOUNTER — OFFICE VISIT (OUTPATIENT)
Dept: FAMILY MEDICINE CLINIC | Facility: CLINIC | Age: 87
End: 2022-08-31
Payer: MEDICARE

## 2022-08-31 VITALS
HEIGHT: 59 IN | WEIGHT: 133.6 LBS | RESPIRATION RATE: 16 BRPM | TEMPERATURE: 98.2 F | BODY MASS INDEX: 26.93 KG/M2 | HEART RATE: 58 BPM | DIASTOLIC BLOOD PRESSURE: 70 MMHG | SYSTOLIC BLOOD PRESSURE: 140 MMHG

## 2022-08-31 DIAGNOSIS — M1A.9XX0 CHRONIC GOUT WITHOUT TOPHUS, UNSPECIFIED CAUSE, UNSPECIFIED SITE: ICD-10-CM

## 2022-08-31 DIAGNOSIS — I10 BENIGN ESSENTIAL HYPERTENSION: ICD-10-CM

## 2022-08-31 DIAGNOSIS — E03.8 OTHER SPECIFIED HYPOTHYROIDISM: ICD-10-CM

## 2022-08-31 DIAGNOSIS — R73.01 IMPAIRED FASTING GLUCOSE: ICD-10-CM

## 2022-08-31 DIAGNOSIS — Z00.00 MEDICARE ANNUAL WELLNESS VISIT, SUBSEQUENT: Primary | ICD-10-CM

## 2022-08-31 PROCEDURE — G0439 PPPS, SUBSEQ VISIT: HCPCS | Performed by: FAMILY MEDICINE

## 2022-08-31 NOTE — PROGRESS NOTES
Assessment and Plan:     Problem List Items Addressed This Visit        Endocrine    Other specified hypothyroidism    Relevant Orders    TSH, 3rd generation       Cardiovascular and Mediastinum    Benign essential hypertension    Relevant Orders    Comprehensive metabolic panel    Lipid panel      Other Visit Diagnoses     Medicare annual wellness visit, subsequent    -  Primary    Impaired fasting glucose        Relevant Orders    Hemoglobin A1C    Chronic gout without tophus, unspecified cause, unspecified site        Relevant Orders    Uric acid        BMI Counseling: Body mass index is 26 98 kg/m²  The BMI is above normal  Exercise recommendations include exercising 3-5 times per week  Rationale for BMI follow-up plan is due to patient being overweight or obese  Depression Screening and Follow-up Plan: Patient was screened for depression during today's encounter  They screened negative with a PHQ-2 score of 0  Preventive health issues were discussed with patient, and age appropriate screening tests were ordered as noted in patient's After Visit Summary  Personalized health advice and appropriate referrals for health education or preventive services given if needed, as noted in patient's After Visit Summary       History of Present Illness:     Patient presents for a Medicare Wellness Visit    HPI   Patient Care Team:  Chas Sim MD as PCP - MD Sam Hammond MD as Endoscopist     Review of Systems:     Review of Systems     Problem List:     Patient Active Problem List   Diagnosis    Asthma    Benign essential hypertension    Chronic kidney disease, stage II (mild)    Mixed hyperlipidemia    Osteoporosis    Other specified hypothyroidism    Abnormal CT of the abdomen    Epigastric pain    Abdominal bloating    Delayed emergence from anesthesia    Back pain    Lipoma of back    Heart murmur      Past Medical and Surgical History:     Past Medical History: Diagnosis Date    Anesthesia     groggy, difficult to awaken    Asthma     Chronic kidney disease     stage 2 mild    Chronic pain disorder     lumbar herniated discs    Dry eyes     Hyperlipidemia     Hypertension      Past Surgical History:   Procedure Laterality Date    APPENDECTOMY      CATARACT EXTRACTION Bilateral     COLONOSCOPY N/A 10/25/2017    Procedure: COLONOSCOPY;  Surgeon: Gutierrez Ibrahim MD;  Location: [facility]; Service: Gastroenterology    JOINT REPLACEMENT Bilateral     partial knee    ND EXC SKIN BENIG >4 CM TRUNK,ARM,LEG Left 6/11/2021    Procedure: EXCISION  BIOPSY LESION/MASS BACK, LEFT BACK, LEFT CHEST WALL;  Surgeon: Pallavi Arnold MD;  Location: [facility];  Service: General      Family History:     Family History   Problem Relation Age of Onset    Heart disease Father     Aneurysm Sister     Heart disease Brother     Cancer Daughter         kidney     Heart disease Brother     Heart disease Brother     Heart disease Sister         MI    Kidney disease Sister     Heart disease Daughter     No Known Problems Maternal Aunt     No Known Problems Maternal Aunt     No Known Problems Maternal Aunt     No Known Problems Paternal Aunt     No Known Problems Paternal Aunt     Breast cancer Sister [de-identified]      Social History:     Social History     Socioeconomic History    Marital status:       Spouse name: None    Number of children: None    Years of education: None    Highest education level: None   Occupational History    None   Tobacco Use    Smoking status: Never Smoker    Smokeless tobacco: Never Used   Vaping Use    Vaping Use: Never used   Substance and Sexual Activity    Alcohol use: Not Currently    Drug use: No    Sexual activity: None   Other Topics Concern    None   Social History Narrative    None     Social Determinants of Health     Financial Resource Strain: Low Risk     Difficulty of Paying Living Expenses: Not hard at all   Food Insecurity: Not on file   Transportation Needs: No Transportation Needs    Lack of Transportation (Medical): No    Lack of Transportation (Non-Medical): No   Physical Activity: Not on file   Stress: Not on file   Social Connections: Not on file   Intimate Partner Violence: Not on file   Housing Stability: Not on file      Medications and Allergies:     Current Outpatient Medications   Medication Sig Dispense Refill    albuterol (PROAIR HFA) 90 mcg/act inhaler Inhale 2 puffs every 6 (six) hours as needed for wheezing 1 Inhaler 2    amLODIPine (NORVASC) 5 mg tablet Take 1 tablet by mouth twice daily (Patient taking differently: 5 mg daily) 60 tablet 6    beclomethasone (Qvar RediHaler) 80 MCG/ACT inhaler Inhale 2 puffs  in the morning and 2 puffs in the evening  Rinse mouth after use    10 6 g 6    cycloSPORINE (RESTASIS) 0 05 % ophthalmic emulsion Administer 1 drop to both eyes 2 (two) times a day      famotidine (PEPCID) 40 MG tablet Take 1 tablet by mouth once daily 30 tablet 0    levothyroxine 50 mcg tablet Take 1 tablet (50 mcg total) by mouth daily 90 tablet 3    metoprolol succinate (TOPROL-XL) 50 mg 24 hr tablet TAKE 1 TABLET BY MOUTH ONCE DAILY AT BEDTIME 90 tablet 1    rosuvastatin (CRESTOR) 5 mg tablet Take 1 tablet by mouth once daily 30 tablet 6    telmisartan-hydrochlorothiazide (MICARDIS HCT) 80-12 5 MG per tablet Take 1 tablet by mouth once daily 90 tablet 0     No current facility-administered medications for this visit  Allergies   Allergen Reactions    Atorvastatin      Other reaction(s): Leg Cramps  Reaction Date: [id number];     Azithromycin      Other reaction(s): Unknown Allergic Reaction  Reaction Date: [id number]; Annotation - [id number]: jjw    Codeine Nausea Only     Reaction Date: [id number]; Annotation - [id number]: jjw    Moxifloxacin      Other reaction(s): Diarrhea    Penicillins Rash     Reaction Date: [id number];  Annotation - [id number]: jjw      Immunizations:     Immunization History   Administered Date(s) Administered    COVID-19 PFIZER VACCINE 0 3 ML IM 01/23/2021, 02/13/2021, 10/08/2021    Influenza Split High Dose Preservative Free IM 09/23/2014, 01/04/2016    Influenza, high dose seasonal 0 7 mL 10/26/2020    Influenza, injectable, quadrivalent, preservative free 0 5 mL 02/11/2019    Influenza, recombinant, quadrivalent,injectable, preservative free 10/18/2019, 11/09/2021    Influenza, seasonal, injectable 12/13/2007, 09/25/2008, 11/13/2012    Pneumococcal Conjugate 13-Valent 06/05/2017    Pneumococcal Polysaccharide PPV23 12/13/2007    influenza, injectable, quadrivalent 10/18/2019, 10/26/2020      Health Maintenance:         Topic Date Due    Breast Cancer Screening: Mammogram  12/23/2022         Topic Date Due    COVID-19 Vaccine (4 - Booster for Pfizer series) 02/08/2022    Influenza Vaccine (1) 09/01/2022      Medicare Screening Tests and Risk Assessments:     Corine Rouse is here for her Subsequent Wellness visit  Last Medicare Wellness visit information reviewed, patient interviewed and updates made to the record today  Health Risk Assessment:   Patient rates overall health as good  Patient feels that their physical health rating is same  Patient is very satisfied with their life  Eyesight was rated as same  Hearing was rated as same  Patient feels that their emotional and mental health rating is same  Patients states they are never, rarely angry  Patient states they are sometimes unusually tired/fatigued  Pain experienced in the last 7 days has been some  Patient's pain rating has been 4/10  Patient states that she has experienced no weight loss or gain in last 6 months  Body  And back pain    Depression Screening:   PHQ-2 Score: 0      Fall Risk Screening: In the past year, patient has experienced: no history of falling in past year      Urinary Incontinence Screening:   Patient has not leaked urine accidently in the last six months       Home Safety:  Patient does not have trouble with stairs inside or outside of their home  Patient has working smoke alarms and has working carbon monoxide detector  Home safety hazards include: none  Nutrition:   Current diet is Low Saturated Fat  Gout diet    Medications:   Patient is currently taking over-the-counter supplements  OTC medications include: see medication list  Patient is able to manage medications  Activities of Daily Living (ADLs)/Instrumental Activities of Daily Living (IADLs):   Walk and transfer into and out of bed and chair?: Yes  Dress and groom yourself?: Yes    Bathe or shower yourself?: Yes    Feed yourself?  Yes  Do your laundry/housekeeping?: Yes  Manage your money, pay your bills and track your expenses?: Yes  Make your own meals?: Yes    Do your own shopping?: Yes    Previous Hospitalizations:   Any hospitalizations or ED visits within the last 12 months?: No      Advance Care Planning:   Living will: No    Durable POA for healthcare: No    Advanced directive: No    Advanced directive counseling given: Yes      Cognitive Screening:   Provider or family/friend/caregiver concerned regarding cognition?: No    PREVENTIVE SCREENINGS      Cardiovascular Screening:    General: Screening Not Indicated and History Lipid Disorder      Diabetes Screening:     General: Screening Current      Colorectal Cancer Screening:     General: Screening Not Indicated      Breast Cancer Screening:     General: Screening Current      Cervical Cancer Screening:    General: Screening Not Indicated      Osteoporosis Screening:    General: Screening Not Indicated and History Osteoporosis      Abdominal Aortic Aneurysm (AAA) Screening:        General: Screening Not Indicated      Lung Cancer Screening:     General: Screening Not Indicated      Hepatitis C Screening:    General: Screening Not Indicated    Screening, Brief Intervention, and Referral to Treatment (SBIRT)    Screening  Typical number of drinks in a day: 0  Typical number of drinks in a week: 0  Interpretation: Low risk drinking behavior      Single Item Drug Screening:  How often have you used an illegal drug (including marijuana) or a prescription medication for non-medical reasons in the past year? never    Single Item Drug Screen Score: 0  Interpretation: Negative screen for possible drug use disorder    No exam data present     Physical Exam:     /70 Comment: by MD  Pulse 58   Temp 98 2 °F (36 8 °C)   Resp 16   Ht 4' 11" (1 499 m)   Wt 60 6 kg (133 lb 9 6 oz)   BMI 26 98 kg/m²     Physical Exam     Greta Paredes MD

## 2022-08-31 NOTE — PATIENT INSTRUCTIONS
Medicare Preventive Visit Patient Instructions  Thank you for completing your Welcome to Medicare Visit or Medicare Annual Wellness Visit today  Your next wellness visit will be due in one year (9/1/2023)  The screening/preventive services that you may require over the next 5-10 years are detailed below  Some tests may not apply to you based off risk factors and/or age  Screening tests ordered at today's visit but not completed yet may show as past due  Also, please note that scanned in results may not display below  Preventive Screenings:  Service Recommendations Previous Testing/Comments   Colorectal Cancer Screening  * Colonoscopy    * Fecal Occult Blood Test (FOBT)/Fecal Immunochemical Test (FIT)  * Fecal DNA/Cologuard Test  * Flexible Sigmoidoscopy Age: 39-70 years old   Colonoscopy: every 10 years (may be performed more frequently if at higher risk)  OR  FOBT/FIT: every 1 year  OR  Cologuard: every 3 years  OR  Sigmoidoscopy: every 5 years  Screening may be recommended earlier than age 39 if at higher risk for colorectal cancer  Also, an individualized decision between you and your healthcare provider will decide whether screening between the ages of 74-80 would be appropriate  Colonoscopy: Not on file  FOBT/FIT: Not on file  Cologuard: Not on file  Sigmoidoscopy: Not on file    Screening Not Indicated     Breast Cancer Screening Age: 36 years old  Frequency: every 1-2 years  Not required if history of left and right mastectomy Mammogram: 12/23/2021    Screening Current   Cervical Cancer Screening Between the ages of 21-29, pap smear recommended once every 3 years  Between the ages of 33-67, can perform pap smear with HPV co-testing every 5 years     Recommendations may differ for women with a history of total hysterectomy, cervical cancer, or abnormal pap smears in past  Pap Smear: Not on file    Screening Not Indicated   Hepatitis C Screening Once for adults born between 1945 and 1965  More frequently in patients at high risk for Hepatitis C Hep C Antibody: Not on file        Diabetes Screening 1-2 times per year if you're at risk for diabetes or have pre-diabetes Fasting glucose: 118 mg/dL (7/27/2022)  A1C: 6 3 % (7/27/2022)  Screening Current   Cholesterol Screening Once every 5 years if you don't have a lipid disorder  May order more often based on risk factors  Lipid panel: 07/27/2022    Screening Not Indicated  History Lipid Disorder     Other Preventive Screenings Covered by Medicare:  1  Abdominal Aortic Aneurysm (AAA) Screening: covered once if your at risk  You're considered to be at risk if you have a family history of AAA  2  Lung Cancer Screening: covers low dose CT scan once per year if you meet all of the following conditions: (1) Age 50-69; (2) No signs or symptoms of lung cancer; (3) Current smoker or have quit smoking within the last 15 years; (4) You have a tobacco smoking history of at least 20 pack years (packs per day multiplied by number of years you smoked); (5) You get a written order from a healthcare provider  3  Glaucoma Screening: covered annually if you're considered high risk: (1) You have diabetes OR (2) Family history of glaucoma OR (3)  aged 48 and older OR (3)  American aged 72 and older  3  Osteoporosis Screening: covered every 2 years if you meet one of the following conditions: (1) You're estrogen deficient and at risk for osteoporosis based off medical history and other findings; (2) Have a vertebral abnormality; (3) On glucocorticoid therapy for more than 3 months; (4) Have primary hyperparathyroidism; (5) On osteoporosis medications and need to assess response to drug therapy  · Last bone density test (DXA Scan): 02/26/2019   5  HIV Screening: covered annually if you're between the age of 15-65  Also covered annually if you are younger than 13 and older than 72 with risk factors for HIV infection   For pregnant patients, it is covered up to 3 times per pregnancy  Immunizations:  Immunization Recommendations   Influenza Vaccine Annual influenza vaccination during flu season is recommended for all persons aged >= 6 months who do not have contraindications   Pneumococcal Vaccine   * Pneumococcal conjugate vaccine = PCV13 (Prevnar 13), PCV15 (Vaxneuvance), PCV20 (Prevnar 20)  * Pneumococcal polysaccharide vaccine = PPSV23 (Pneumovax) Adults 25-60 years old: 1-3 doses may be recommended based on certain risk factors  Adults 72 years old: 1-2 doses may be recommended based off what pneumonia vaccine you previously received   Hepatitis B Vaccine 3 dose series if at intermediate or high risk (ex: diabetes, end stage renal disease, liver disease)   Tetanus (Td) Vaccine - COST NOT COVERED BY MEDICARE PART B Following completion of primary series, a booster dose should be given every 10 years to maintain immunity against tetanus  Td may also be given as tetanus wound prophylaxis  Tdap Vaccine - COST NOT COVERED BY MEDICARE PART B Recommended at least once for all adults  For pregnant patients, recommended with each pregnancy  Shingles Vaccine (Shingrix) - COST NOT COVERED BY MEDICARE PART B  2 shot series recommended in those aged 48 and above     Health Maintenance Due:      Topic Date Due    Breast Cancer Screening: Mammogram  12/23/2022     Immunizations Due:      Topic Date Due    COVID-19 Vaccine (4 - Booster for Pfizer series) 02/08/2022    Influenza Vaccine (1) 09/01/2022     Advance Directives   What are advance directives? Advance directives are legal documents that state your wishes and plans for medical care  These plans are made ahead of time in case you lose your ability to make decisions for yourself  Advance directives can apply to any medical decision, such as the treatments you want, and if you want to donate organs  What are the types of advance directives?   There are many types of advance directives, and each state has rules about how to use them  You may choose a combination of any of the following:  · Living will: This is a written record of the treatment you want  You can also choose which treatments you do not want, which to limit, and which to stop at a certain time  This includes surgery, medicine, IV fluid, and tube feedings  · Durable power of  for healthcare [facility]): This is a written record that states who you want to make healthcare choices for you when you are unable to make them for yourself  This person, called a proxy, is usually a family member or a friend  You may choose more than 1 proxy  · Do not resuscitate (DNR) order:  A DNR order is used in case your heart stops beating or you stop breathing  It is a request not to have certain forms of treatment, such as CPR  A DNR order may be included in other types of advance directives  · Medical directive: This covers the care that you want if you are in a coma, near death, or unable to make decisions for yourself  You can list the treatments you want for each condition  Treatment may include pain medicine, surgery, blood transfusions, dialysis, IV or tube feedings, and a ventilator (breathing machine)  · Values history: This document has questions about your views, beliefs, and how you feel and think about life  This information can help others choose the care that you would choose  Why are advance directives important? An advance directive helps you control your care  Although spoken wishes may be used, it is better to have your wishes written down  Spoken wishes can be misunderstood, or not followed  Treatments may be given even if you do not want them  An advance directive may make it easier for your family to make difficult choices about your care     Weight Management   Why it is important to manage your weight:  Being overweight increases your risk of health conditions such as heart disease, high blood pressure, type 2 diabetes, and certain types of cancer  It can also increase your risk for osteoarthritis, sleep apnea, and other respiratory problems  Aim for a slow, steady weight loss  Even a small amount of weight loss can lower your risk of health problems  How to lose weight safely:  A safe and healthy way to lose weight is to eat fewer calories and get regular exercise  You can lose up about 1 pound a week by decreasing the number of calories you eat by 500 calories each day  Healthy meal plan for weight management:  A healthy meal plan includes a variety of foods, contains fewer calories, and helps you stay healthy  A healthy meal plan includes the following:  · Eat whole-grain foods more often  A healthy meal plan should contain fiber  Fiber is the part of grains, fruits, and vegetables that is not broken down by your body  Whole-grain foods are healthy and provide extra fiber in your diet  Some examples of whole-grain foods are whole-wheat breads and pastas, oatmeal, brown rice, and bulgur  · Eat a variety of vegetables every day  Include dark, leafy greens such as spinach, kale, tammy greens, and mustard greens  Eat yellow and orange vegetables such as carrots, sweet potatoes, and winter squash  · Eat a variety of fruits every day  Choose fresh or canned fruit (canned in its own juice or light syrup) instead of juice  Fruit juice has very little or no fiber  · Eat low-fat dairy foods  Drink fat-free (skim) milk or 1% milk  Eat fat-free yogurt and low-fat cottage cheese  Try low-fat cheeses such as mozzarella and other reduced-fat cheeses  · Choose meat and other protein foods that are low in fat  Choose beans or other legumes such as split peas or lentils  Choose fish, skinless poultry (chicken or turkey), or lean cuts of red meat (beef or pork)  Before you cook meat or poultry, cut off any visible fat  · Use less fat and oil  Try baking foods instead of frying them   Add less fat, such as margarine, sour cream, regular salad dressing and mayonnaise to foods  Eat fewer high-fat foods  Some examples of high-fat foods include french fries, doughnuts, ice cream, and cakes  · Eat fewer sweets  Limit foods and drinks that are high in sugar  This includes candy, cookies, regular soda, and sweetened drinks  Exercise:  Exercise at least 30 minutes per day on most days of the week  Some examples of exercise include walking, biking, dancing, and swimming  You can also fit in more physical activity by taking the stairs instead of the elevator or parking farther away from stores  Ask your healthcare provider about the best exercise plan for you  © Copyright [employer] 2018 Information is for End User's use only and may not be sold, redistributed or otherwise used for commercial purposes   All illustrations and images included in CareNotes® are the copyrighted property of [employer]  or [address] [place] [address] [employer]

## 2022-09-13 DIAGNOSIS — E03.8 SUBCLINICAL HYPOTHYROIDISM: ICD-10-CM

## 2022-09-13 RX ORDER — LEVOTHYROXINE SODIUM 50 UG/1
TABLET ORAL
Qty: 30 TABLET | Refills: 0 | Status: SHIPPED | OUTPATIENT
Start: 2022-09-13 | End: 2022-10-17

## 2022-09-20 ENCOUNTER — OFFICE VISIT (OUTPATIENT)
Dept: PODIATRY | Facility: CLINIC | Age: 87
End: 2022-09-20
Payer: MEDICARE

## 2022-09-20 VITALS
BODY MASS INDEX: 26.81 KG/M2 | RESPIRATION RATE: 16 BRPM | WEIGHT: 133 LBS | SYSTOLIC BLOOD PRESSURE: 140 MMHG | DIASTOLIC BLOOD PRESSURE: 70 MMHG | HEIGHT: 59 IN

## 2022-09-20 DIAGNOSIS — M77.42 METATARSALGIA OF BOTH FEET: Primary | ICD-10-CM

## 2022-09-20 DIAGNOSIS — M20.11 HALLUX VALGUS OF RIGHT FOOT: ICD-10-CM

## 2022-09-20 DIAGNOSIS — M54.16 RADICULOPATHY OF LUMBAR REGION: ICD-10-CM

## 2022-09-20 DIAGNOSIS — M25.571 ARTHRALGIA OF RIGHT FOOT: ICD-10-CM

## 2022-09-20 DIAGNOSIS — M77.41 METATARSALGIA OF BOTH FEET: Primary | ICD-10-CM

## 2022-09-20 PROCEDURE — 99213 OFFICE O/P EST LOW 20 MIN: CPT | Performed by: PODIATRIST

## 2022-09-20 RX ORDER — DULOXETIN HYDROCHLORIDE 30 MG/1
30 CAPSULE, DELAYED RELEASE ORAL DAILY
Qty: 30 CAPSULE | Refills: 0 | Status: SHIPPED | OUTPATIENT
Start: 2022-09-20 | End: 2022-10-20

## 2022-09-20 NOTE — PROGRESS NOTES
Assessment/Plan:  Metatarsalgia right greater than left secondary to radiculopathy  Pain upon ambulation  Hallux valgus deformity  Plan  Patient advised on condition  X-rays reviewed with patient  At this time she has been referred to pain management  We will try Cymbalta  Return for follow-up         Diagnoses and all orders for this visit:    Metatarsalgia of both feet  -     DULoxetine (CYMBALTA) 30 mg delayed release capsule; Take 1 capsule (30 mg total) by mouth daily    Radiculopathy of lumbar region  -     DULoxetine (CYMBALTA) 30 mg delayed release capsule; Take 1 capsule (30 mg total) by mouth daily  -     Ambulatory Referral to Pain Management; Future    Hallux valgus of right foot    Arthralgia of right foot          Subjective:  Patient was unable to tolerate gabapentin  I gave her double vision  She still has a numb right foot  She gets pain upon ambulation  No history of trauma  She does have back pain  Allergies   Allergen Reactions    Atorvastatin      Other reaction(s): Leg Cramps  Reaction Date: [id number];     Azithromycin      Other reaction(s): Unknown Allergic Reaction  Reaction Date: [id number]; Annotation - [id number]: [email or web address]    Codeine Nausea Only     Reaction Date: [id number]; Annotation - [id number]: [email or web address]    Moxifloxacin      Other reaction(s): Diarrhea    Penicillins Rash     Reaction Date: [id number]; Annotation - [id number]: [email or web address]         Current Outpatient Medications:     DULoxetine (CYMBALTA) 30 mg delayed release capsule, Take 1 capsule (30 mg total) by mouth daily, Disp: 30 capsule, Rfl: 0    albuterol (PROAIR HFA) 90 mcg/act inhaler, Inhale 2 puffs every 6 (six) hours as needed for wheezing, Disp: 1 Inhaler, Rfl: 2    amLODIPine (NORVASC) 5 mg tablet, Take 1 tablet by mouth twice daily (Patient taking differently: 5 mg daily), Disp: 60 tablet, Rfl: 6    beclomethasone (Qvar RediHaler) 80 MCG/ACT inhaler, Inhale 2 puffs  in the morning and 2 puffs in the evening  Rinse mouth after use   , Disp: 10 6 g, Rfl: 6    cycloSPORINE (RESTASIS) 0 05 % ophthalmic emulsion, Administer 1 drop to both eyes 2 (two) times a day, Disp: , Rfl:     Euthyrox 50 MCG tablet, Take 1 tablet by mouth once daily, Disp: 30 tablet, Rfl: 0    famotidine (PEPCID) 40 MG tablet, Take 1 tablet by mouth once daily, Disp: 30 tablet, Rfl: 0    metoprolol succinate (TOPROL-XL) 50 mg 24 hr tablet, TAKE 1 TABLET BY MOUTH ONCE DAILY AT BEDTIME, Disp: 90 tablet, Rfl: 1    rosuvastatin (CRESTOR) 5 mg tablet, Take 1 tablet by mouth once daily, Disp: 30 tablet, Rfl: 6    telmisartan-hydrochlorothiazide (MICARDIS HCT) 80-12 5 MG per tablet, Take 1 tablet by mouth once daily, Disp: 90 tablet, Rfl: 0    Patient Active Problem List   Diagnosis    Asthma    Benign essential hypertension    Chronic kidney disease, stage II (mild)    Mixed hyperlipidemia    Osteoporosis    Other specified hypothyroidism    Abnormal CT of the abdomen    Epigastric pain    Abdominal bloating    Delayed emergence from anesthesia    Back pain    Lipoma of back    Heart murmur          Patient ID: Katt Mckeon is a 80 y o  female  HPI    The following portions of the patient's history were reviewed and updated as appropriate:     family history includes Aneurysm in her sister; Breast cancer (age of onset: [de-identified]) in her sister; Cancer in her daughter; Heart disease in her brother, brother, brother, daughter, father, and sister; Kidney disease in her sister; No Known Problems in her maternal aunt, maternal aunt, maternal aunt, paternal aunt, and paternal aunt  reports that she has never smoked  She has never used smokeless tobacco  She reports previous alcohol use  She reports that she does not use drugs  Vitals:    09/20/22 1201   BP: 140/70   Resp: 16       Review of Systems      Objective:  Patient's shoes and socks removed     Foot ExamPhysical Exam

## 2022-10-04 ENCOUNTER — CONSULT (OUTPATIENT)
Dept: PAIN MEDICINE | Facility: CLINIC | Age: 87
End: 2022-10-04
Payer: MEDICARE

## 2022-10-04 VITALS
RESPIRATION RATE: 19 BRPM | TEMPERATURE: 98.2 F | WEIGHT: 135 LBS | SYSTOLIC BLOOD PRESSURE: 125 MMHG | HEIGHT: 59 IN | DIASTOLIC BLOOD PRESSURE: 75 MMHG | HEART RATE: 78 BPM | BODY MASS INDEX: 27.21 KG/M2

## 2022-10-04 DIAGNOSIS — M51.36 LUMBAR DEGENERATIVE DISC DISEASE: Primary | ICD-10-CM

## 2022-10-04 DIAGNOSIS — M54.16 RADICULOPATHY OF LUMBAR REGION: ICD-10-CM

## 2022-10-04 PROCEDURE — 99204 OFFICE O/P NEW MOD 45 MIN: CPT | Performed by: PHYSICAL MEDICINE & REHABILITATION
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
## 2022-10-18 ENCOUNTER — OFFICE VISIT (OUTPATIENT)
Dept: PODIATRY | Facility: CLINIC | Age: 87
End: 2022-10-18
Payer: MEDICARE

## 2022-10-18 VITALS
HEIGHT: 59 IN | DIASTOLIC BLOOD PRESSURE: 75 MMHG | RESPIRATION RATE: 17 BRPM | SYSTOLIC BLOOD PRESSURE: 125 MMHG | WEIGHT: 135 LBS | BODY MASS INDEX: 27.21 KG/M2

## 2022-10-18 DIAGNOSIS — M54.16 RADICULOPATHY OF LUMBAR REGION: ICD-10-CM

## 2022-10-18 DIAGNOSIS — M25.571 ARTHRALGIA OF RIGHT FOOT: ICD-10-CM

## 2022-10-18 DIAGNOSIS — M20.11 HALLUX VALGUS OF RIGHT FOOT: ICD-10-CM

## 2022-10-18 DIAGNOSIS — B35.1 ONYCHOMYCOSIS: ICD-10-CM

## 2022-10-18 DIAGNOSIS — M77.42 METATARSALGIA OF BOTH FEET: Primary | ICD-10-CM

## 2022-10-18 DIAGNOSIS — M77.41 METATARSALGIA OF BOTH FEET: Primary | ICD-10-CM

## 2022-10-18 PROCEDURE — 99213 OFFICE O/P EST LOW 20 MIN: CPT | Performed by: PODIATRIST

## 2022-10-18 NOTE — PROGRESS NOTES
Assessment/Plan:  Metatarsalgia secondary to radiculopathy, right greater than left  Pain upon ambulation  Mycosis of nail  Arthralgia  Plan  Patient advised on condition  She will remain on Cymbalta as directed  She will follow-up with pain management  Today all nails debrided without pain or complication  Return for follow-up       Diagnoses and all orders for this visit:    Metatarsalgia of both feet    Radiculopathy of lumbar region    Hallux valgus of right foot    Arthralgia of right foot    Onychomycosis          Subjective:  Patient has pain in her feet  She has pain with ambulation shoe wear  Right greater than left  She is aware she has back problems causing foot problems  She is following up with pain management  She is taking Cymbalta  She also gets pain in her toes ambulation and shoe wear  No history of trauma    Allergies   Allergen Reactions   • Atorvastatin      Other reaction(s): Leg Cramps  Reaction Date: [id number];    • Azithromycin      Other reaction(s): Unknown Allergic Reaction  Reaction Date: [id number]; Annotation - [id number]: jjw   • Codeine Nausea Only     Reaction Date: [id number]; Annotation - [id number]: jjw   • Moxifloxacin      Other reaction(s): Diarrhea   • Penicillins Rash     Reaction Date: [id number]; Annotation - [id number]: jjw         Current Outpatient Medications:   •  albuterol (PROAIR HFA) 90 mcg/act inhaler, Inhale 2 puffs every 6 (six) hours as needed for wheezing, Disp: 1 Inhaler, Rfl: 2  •  amLODIPine (NORVASC) 5 mg tablet, Take 1 tablet by mouth twice daily (Patient taking differently: 5 mg daily), Disp: 60 tablet, Rfl: 6  •  beclomethasone (Qvar RediHaler) 80 MCG/ACT inhaler, Inhale 2 puffs  in the morning and 2 puffs in the evening  Rinse mouth after use   , Disp: 10 6 g, Rfl: 6  •  cycloSPORINE (RESTASIS) 0 05 % ophthalmic emulsion, Administer 1 drop to both eyes 2 (two) times a day, Disp: , Rfl:   •  DULoxetine (CYMBALTA) 30 mg delayed release capsule, Take 1 capsule (30 mg total) by mouth daily, Disp: 30 capsule, Rfl: 0  •  famotidine (PEPCID) 40 MG tablet, Take 1 tablet by mouth once daily, Disp: 30 tablet, Rfl: 0  •  levothyroxine 50 mcg tablet, Take 1 tablet by mouth once daily, Disp: 90 tablet, Rfl: 0  •  metoprolol succinate (TOPROL-XL) 50 mg 24 hr tablet, TAKE 1 TABLET BY MOUTH ONCE DAILY AT BEDTIME, Disp: 90 tablet, Rfl: 1  •  rosuvastatin (CRESTOR) 5 mg tablet, Take 1 tablet by mouth once daily, Disp: 30 tablet, Rfl: 6  •  telmisartan-hydrochlorothiazide (MICARDIS HCT) 80-12 5 MG per tablet, Take 1 tablet by mouth once daily, Disp: 90 tablet, Rfl: 0    Patient Active Problem List   Diagnosis   • Asthma   • Benign essential hypertension   • Chronic kidney disease, stage II (mild)   • Mixed hyperlipidemia   • Osteoporosis   • Other specified hypothyroidism   • Abnormal CT of the abdomen   • Epigastric pain   • Abdominal bloating   • Delayed emergence from anesthesia   • Back pain   • Lipoma of back   • Heart murmur          Patient ID: Pia Das is a 80 y o  female  HPI    The following portions of the patient's history were reviewed and updated as appropriate:     family history includes Aneurysm in her sister; Breast cancer (age of onset: [de-identified]) in her sister; Cancer in her daughter; Heart disease in her brother, brother, brother, daughter, father, and sister; Kidney disease in her sister; No Known Problems in her maternal aunt, maternal aunt, maternal aunt, paternal aunt, and paternal aunt  reports that she has never smoked  She has never used smokeless tobacco  She reports previous alcohol use  She reports that she does not use drugs  Vitals:    10/18/22 1406   BP: 125/75   Resp: 17       Review of Systems      Objective:  Patient's shoes and socks removed  Foot ExamPhysical Exam      Patient's shoes and socks removed     Foot ExamPhysical Exam          General  General Appearance: appears stated age and healthy   Orientation: alert and oriented to person, place, and time   Affect: appropriate   Gait: antalgic         Right Foot/Ankle      Inspection and Palpation  Ecchymosis: none  Tenderness: bony tenderness    Pain patient has inflamed bunion   No evidence of a infection or gout  Swelling: dorsum   Arch: pes cavus  Claw Toes: second toe  Hallux valgus: no  Hallux limitus: yes  Skin Exam: callus and dry skin;      Neurovascular  Dorsalis pedis: 3+  Posterior tibial: 3+  Saphenous nerve sensation: normal  Tibial nerve sensation: normal  Superficial peroneal nerve sensation: normal  Deep peroneal nerve sensation: normal  Sural nerve sensation: normal        Left Foot/Ankle       Inspection and Palpation  Ecchymosis: none  Swelling: dorsum   Arch: pes planus  Hallux valgus: no  Hallux limitus: yes  Skin Exam: callus and dry skin;      Neurovascular  Dorsalis pedis: 3+  Posterior tibial: 3+  Saphenous nerve sensation: normal  Tibial nerve sensation: normal  Superficial peroneal nerve sensation: normal  Deep peroneal nerve sensation: normal  Sural nerve sensation: normal           Physical Exam  Vitals signs and nursing note reviewed  Constitutional:       Appearance: Normal appearance  Cardiovascular:      Pulses:           Dorsalis pedis pulses are 3+ on the right side and 3+ on the left side         Posterior tibial pulses are 3+ on the right side and 3+ on the left side  Musculoskeletal:      Right foot: Bony tenderness present  No bunion       Left foot: No bunion       Comments: X-ray of right foot demonstrates no evidence of fracture osteomyelitis   However middle phalanx of 2nd right toe demonstrates cystic change consistent with interosseous tophaceous change    Feet:      Right foot:      Skin integrity: Callus and dry skin present       Left foot:      Skin integrity: Callus and dry skin present     Skin:     General: Skin is warm       Patient has 4/5 L5 testing

## 2022-11-01 ENCOUNTER — TELEPHONE (OUTPATIENT)
Dept: PAIN MEDICINE | Facility: CLINIC | Age: 87
End: 2022-11-01

## 2022-11-01 NOTE — TELEPHONE ENCOUNTER
Caller: Hadley Dillon    Doctor: Reyna Julien    Reason for call: Patient called stating that she can have the MRI at [facility]) - [facility] call [phone] to provide patient information and script    Call back#:[phone]

## 2022-11-01 NOTE — TELEPHONE ENCOUNTER
Caller: Patient     Doctor: Dr Mckay Rosales     Reason for call: [facility] to [facility] can she go there because she has to wear an mask in the machine/ Or is there one the doctor can recommend  Call back#: [phone]

## 2022-11-01 NOTE — TELEPHONE ENCOUNTER
S/w pt who is attempting to make MRI appt at a facility that does not reqr her to wear a mask during imaging  Pt states having severe asthma and has a diff time wearing a mask  RN provided phone # for [facility] in [place], [place] for pt to call and inquire about mask wearing  Phone # provided [phone]  Pt appreciative of call and will c/b if she would like referral for MRI sent to this facility       If pt would like to use this facility ([facility]) fax # is [phone] phone # [phone]

## 2022-11-01 NOTE — TELEPHONE ENCOUNTER
S/w pt and advised RN faxed MRI order to [facility]) at fax # [phone]  RN advised pt that if there is any further info needed for MRI compl to pls reach out to [address] at [phone]  Pt verbalized understanding and apprec of call

## 2022-11-15 ENCOUNTER — OFFICE VISIT (OUTPATIENT)
Dept: PAIN MEDICINE | Facility: CLINIC | Age: 87
End: 2022-11-15

## 2022-11-15 VITALS
DIASTOLIC BLOOD PRESSURE: 75 MMHG | HEART RATE: 68 BPM | SYSTOLIC BLOOD PRESSURE: 123 MMHG | RESPIRATION RATE: 19 BRPM | WEIGHT: 134 LBS | TEMPERATURE: 98.2 F | HEIGHT: 59 IN | BODY MASS INDEX: 27.01 KG/M2

## 2022-11-15 DIAGNOSIS — M48.061 FORAMINAL STENOSIS OF LUMBAR REGION: Primary | ICD-10-CM

## 2022-11-15 DIAGNOSIS — G89.4 CHRONIC PAIN SYNDROME: ICD-10-CM

## 2022-11-15 DIAGNOSIS — M51.16 LUMBAR DISC DISEASE WITH RADICULOPATHY: ICD-10-CM

## 2022-11-15 NOTE — H&P (VIEW-ONLY)
Pain Medicine Follow-Up Note    Assessment:  1  Foraminal stenosis of lumbar region    2  Chronic pain syndrome    3  Lumbar disc disease with radiculopathy        Plan:  Ms Alton Bryan is a pleasant 28-year-old female who presents for follow-up and re-evaluation as well as MRI discussion regarding ongoing low back pain with radiating symptoms into right lower extremity  Her worse pain is the radicular symptoms into the right dorsum of her foot with clinical and diagnostic evidence of lumbar radiculopathy likely relation to the multilevel degenerative disc disease and MRI findings of moderate central and foraminal stenosis most notable at L4-L5  At this time interventional approaches would be beneficial and warranted  As such we will plan for a right-sided L4-L5 and L5-S1 TFESI under fluoro guidance  All questions answered, patient is agreeable with plan  History of Present Illness:    Roxi Xavier is a 80 y o  female who presents to [facility] for interval re-evaluation of the above stated pain complaints  The patient has a past medical and chronic pain history as outlined in the assessment section  Patient presents for follow-up and re-evaluation regarding ongoing low back pain with radiating symptoms into right lower extremity as well as MRI discussion  Currently reports 6/10 pain that is described as a constant numbness, burning, pins and needle sensation that is worse in the morning  Presents today for follow-up and re-evaluation  Other than as stated above, the patient denies any interval changes in medications, medical condition, mental condition, symptoms, or allergies since the last office visit  Review of Systems:    Review of Systems   Constitutional: Negative for unexpected weight change  HENT: Negative for ear pain  Eyes: Negative for visual disturbance  Respiratory: Negative for shortness of breath and wheezing      Gastrointestinal: Negative for abdominal pain  Musculoskeletal: Positive for back pain, gait problem and joint swelling  Joint stiffness in back down right side to foot, Decreased ROM, joint stiffness , pain in right side of leg  Neurological: Positive for weakness and numbness  Psychiatric/Behavioral: Positive for sleep disturbance  Negative for decreased concentration  Past Medical History:   Diagnosis Date   • Anesthesia     groggy, difficult to awaken   • Asthma    • Chronic kidney disease     stage 2 mild   • Chronic pain disorder     lumbar herniated discs   • Dry eyes    • Hyperlipidemia    • Hypertension        Past Surgical History:   Procedure Laterality Date   • APPENDECTOMY     • CATARACT EXTRACTION Bilateral    • COLONOSCOPY N/A 10/25/2017    Procedure: COLONOSCOPY;  Surgeon: Romi Coyne MD;  Location: [facility];   Service: Gastroenterology   • JOINT REPLACEMENT Bilateral     partial knee   • SD EXC SKIN BENIG >4 CM TRUNK,ARM,LEG Left 6/11/2021    Procedure: EXCISION  BIOPSY LESION/MASS BACK, LEFT BACK, LEFT CHEST WALL;  Surgeon: Sanaz Jones MD;  Location: [facility];  Service: General       Family History   Problem Relation Age of Onset   • Heart disease Father    • Aneurysm Sister    • Heart disease Brother    • Cancer Daughter         kidney    • Heart disease Brother    • Heart disease Brother    • Heart disease Sister         MI   • Kidney disease Sister    • Heart disease Daughter    • No Known Problems Maternal Aunt    • No Known Problems Maternal Aunt    • No Known Problems Maternal Aunt    • No Known Problems Paternal Aunt    • No Known Problems Paternal Aunt    • Breast cancer Sister [de-identified]       Social History     Occupational History   • Not on file   Tobacco Use   • Smoking status: Never Smoker   • Smokeless tobacco: Never Used   Vaping Use   • Vaping Use: Never used   Substance and Sexual Activity   • Alcohol use: Not Currently   • Drug use: No   • Sexual activity: Not on file         Current Outpatient Medications:   •  albuterol (PROAIR HFA) 90 mcg/act inhaler, Inhale 2 puffs every 6 (six) hours as needed for wheezing, Disp: 1 Inhaler, Rfl: 2  •  amLODIPine (NORVASC) 5 mg tablet, Take 1 tablet (5 mg total) by mouth daily, Disp: 90 tablet, Rfl: 1  •  beclomethasone (Qvar RediHaler) 80 MCG/ACT inhaler, Inhale 2 puffs  in the morning and 2 puffs in the evening  Rinse mouth after use   , Disp: 10 6 g, Rfl: 6  •  cycloSPORINE (RESTASIS) 0 05 % ophthalmic emulsion, Administer 1 drop to both eyes 2 (two) times a day, Disp: , Rfl:   •  famotidine (PEPCID) 40 MG tablet, Take 1 tablet by mouth once daily, Disp: 30 tablet, Rfl: 0  •  levothyroxine 50 mcg tablet, Take 1 tablet by mouth once daily, Disp: 90 tablet, Rfl: 0  •  metoprolol succinate (TOPROL-XL) 50 mg 24 hr tablet, TAKE 1 TABLET BY MOUTH ONCE DAILY AT BEDTIME, Disp: 90 tablet, Rfl: 1  •  rosuvastatin (CRESTOR) 5 mg tablet, Take 1 tablet by mouth once daily, Disp: 30 tablet, Rfl: 6  •  telmisartan-hydrochlorothiazide (MICARDIS HCT) 80-12 5 MG per tablet, Take 1 tablet by mouth once daily, Disp: 90 tablet, Rfl: 0  •  DULoxetine (CYMBALTA) 30 mg delayed release capsule, Take 1 capsule (30 mg total) by mouth daily, Disp: 30 capsule, Rfl: 0    Allergies   Allergen Reactions   • Atorvastatin      Other reaction(s): Leg Cramps  Reaction Date: [id number];    • Azithromycin      Other reaction(s): Unknown Allergic Reaction  Reaction Date: [id number]; Annotation - [id number]: jjw   • Codeine Nausea Only     Reaction Date: [id number]; Annotation - [id number]: jjw   • Moxifloxacin      Other reaction(s): Diarrhea   • Penicillins Rash     Reaction Date: [id number];  Annotation - [id number]: jjw       Physical Exam:    /75   Pulse 68   Temp 98 2 °F (36 8 °C)   Resp 19   Ht 4' 11" (1 499 m)   Wt 60 8 kg (134 lb)   BMI 27 06 kg/m²     Constitutional:normal, well developed, well nourished, alert, in no distress and non-toxic and no overt pain behavior  Eyes:anicteric  HEENT:grossly intact  Neck:supple, symmetric, trachea midline and no masses   Pulmonary:even and unlabored  Cardiovascular:No edema or pitting edema present  Skin:Normal without rashes or lesions and well hydrated  Psychiatric:Mood and affect appropriate  Neurologic:Cranial Nerves II-XII grossly intact  Musculoskeletal:  Antalgic      Imaging  Media Information                    Document Information    Forms   MRI REPORT   11/15/2022   Attached To: Office Visit on 11/15/22 with [facility], DO     Source Information    [address] [place]           No orders to display         No orders of the defined types were placed in this encounter

## 2022-11-15 NOTE — PROGRESS NOTES
Pain Medicine Follow-Up Note    Assessment:  1  Foraminal stenosis of lumbar region    2  Chronic pain syndrome    3  Lumbar disc disease with radiculopathy        Plan:  Ms Russ Pereira is a pleasant 80-year-old female who presents for follow-up and re-evaluation as well as MRI discussion regarding ongoing low back pain with radiating symptoms into right lower extremity  Her worse pain is the radicular symptoms into the right dorsum of her foot with clinical and diagnostic evidence of lumbar radiculopathy likely relation to the multilevel degenerative disc disease and MRI findings of moderate central and foraminal stenosis most notable at L4-L5  At this time interventional approaches would be beneficial and warranted  As such we will plan for a right-sided L4-L5 and L5-S1 TFESI under fluoro guidance  All questions answered, patient is agreeable with plan  History of Present Illness:    Zakia Li is a 80 y o  female who presents to [facility] for interval re-evaluation of the above stated pain complaints  The patient has a past medical and chronic pain history as outlined in the assessment section  Patient presents for follow-up and re-evaluation regarding ongoing low back pain with radiating symptoms into right lower extremity as well as MRI discussion  Currently reports 6/10 pain that is described as a constant numbness, burning, pins and needle sensation that is worse in the morning  Presents today for follow-up and re-evaluation  Other than as stated above, the patient denies any interval changes in medications, medical condition, mental condition, symptoms, or allergies since the last office visit  Review of Systems:    Review of Systems   Constitutional: Negative for unexpected weight change  HENT: Negative for ear pain  Eyes: Negative for visual disturbance  Respiratory: Negative for shortness of breath and wheezing      Gastrointestinal: Negative for abdominal pain  Musculoskeletal: Positive for back pain, gait problem and joint swelling  Joint stiffness in back down right side to foot, Decreased ROM, joint stiffness , pain in right side of leg  Neurological: Positive for weakness and numbness  Psychiatric/Behavioral: Positive for sleep disturbance  Negative for decreased concentration  Past Medical History:   Diagnosis Date   • Anesthesia     groggy, difficult to awaken   • Asthma    • Chronic kidney disease     stage 2 mild   • Chronic pain disorder     lumbar herniated discs   • Dry eyes    • Hyperlipidemia    • Hypertension        Past Surgical History:   Procedure Laterality Date   • APPENDECTOMY     • CATARACT EXTRACTION Bilateral    • COLONOSCOPY N/A 10/25/2017    Procedure: COLONOSCOPY;  Surgeon: Christopher Fulton MD;  Location: [facility];   Service: Gastroenterology   • JOINT REPLACEMENT Bilateral     partial knee   • AK EXC SKIN BENIG >4 CM TRUNK,ARM,LEG Left 6/11/2021    Procedure: EXCISION  BIOPSY LESION/MASS BACK, LEFT BACK, LEFT CHEST WALL;  Surgeon: Stephanie Espinoza MD;  Location: [facility];  Service: General       Family History   Problem Relation Age of Onset   • Heart disease Father    • Aneurysm Sister    • Heart disease Brother    • Cancer Daughter         kidney    • Heart disease Brother    • Heart disease Brother    • Heart disease Sister         MI   • Kidney disease Sister    • Heart disease Daughter    • No Known Problems Maternal Aunt    • No Known Problems Maternal Aunt    • No Known Problems Maternal Aunt    • No Known Problems Paternal Aunt    • No Known Problems Paternal Aunt    • Breast cancer Sister [de-identified]       Social History     Occupational History   • Not on file   Tobacco Use   • Smoking status: Never Smoker   • Smokeless tobacco: Never Used   Vaping Use   • Vaping Use: Never used   Substance and Sexual Activity   • Alcohol use: Not Currently   • Drug use: No   • Sexual activity: Not on file         Current Outpatient Medications:   •  albuterol (PROAIR HFA) 90 mcg/act inhaler, Inhale 2 puffs every 6 (six) hours as needed for wheezing, Disp: 1 Inhaler, Rfl: 2  •  amLODIPine (NORVASC) 5 mg tablet, Take 1 tablet (5 mg total) by mouth daily, Disp: 90 tablet, Rfl: 1  •  beclomethasone (Qvar RediHaler) 80 MCG/ACT inhaler, Inhale 2 puffs  in the morning and 2 puffs in the evening  Rinse mouth after use   , Disp: 10 6 g, Rfl: 6  •  cycloSPORINE (RESTASIS) 0 05 % ophthalmic emulsion, Administer 1 drop to both eyes 2 (two) times a day, Disp: , Rfl:   •  famotidine (PEPCID) 40 MG tablet, Take 1 tablet by mouth once daily, Disp: 30 tablet, Rfl: 0  •  levothyroxine 50 mcg tablet, Take 1 tablet by mouth once daily, Disp: 90 tablet, Rfl: 0  •  metoprolol succinate (TOPROL-XL) 50 mg 24 hr tablet, TAKE 1 TABLET BY MOUTH ONCE DAILY AT BEDTIME, Disp: 90 tablet, Rfl: 1  •  rosuvastatin (CRESTOR) 5 mg tablet, Take 1 tablet by mouth once daily, Disp: 30 tablet, Rfl: 6  •  telmisartan-hydrochlorothiazide (MICARDIS HCT) 80-12 5 MG per tablet, Take 1 tablet by mouth once daily, Disp: 90 tablet, Rfl: 0  •  DULoxetine (CYMBALTA) 30 mg delayed release capsule, Take 1 capsule (30 mg total) by mouth daily, Disp: 30 capsule, Rfl: 0    Allergies   Allergen Reactions   • Atorvastatin      Other reaction(s): Leg Cramps  Reaction Date: [id number];    • Azithromycin      Other reaction(s): Unknown Allergic Reaction  Reaction Date: [id number]; Annotation - [id number]: jjw   • Codeine Nausea Only     Reaction Date: [id number]; Annotation - [id number]: jjw   • Moxifloxacin      Other reaction(s): Diarrhea   • Penicillins Rash     Reaction Date: [id number];  Annotation - [id number]: jjw       Physical Exam:    /75   Pulse 68   Temp 98 2 °F (36 8 °C)   Resp 19   Ht 4' 11" (1 499 m)   Wt 60 8 kg (134 lb)   BMI 27 06 kg/m²     Constitutional:normal, well developed, well nourished, alert, in no distress and non-toxic and no overt pain behavior  Eyes:anicteric  HEENT:grossly intact  Neck:supple, symmetric, trachea midline and no masses   Pulmonary:even and unlabored  Cardiovascular:No edema or pitting edema present  Skin:Normal without rashes or lesions and well hydrated  Psychiatric:Mood and affect appropriate  Neurologic:Cranial Nerves II-XII grossly intact  Musculoskeletal:  Antalgic      Imaging  Media Information                    Document Information    Forms   MRI REPORT   11/15/2022   Attached To: Office Visit on 11/15/22 with Vickie Morse DO     Source Information    [address] [place]           No orders to display         No orders of the defined types were placed in this encounter

## 2022-11-17 ENCOUNTER — CLINICAL SUPPORT (OUTPATIENT)
Dept: FAMILY MEDICINE CLINIC | Facility: CLINIC | Age: 87
End: 2022-11-17

## 2022-11-17 ENCOUNTER — TELEPHONE (OUTPATIENT)
Dept: FAMILY MEDICINE CLINIC | Facility: CLINIC | Age: 87
End: 2022-11-17

## 2022-11-17 DIAGNOSIS — Z23 NEED FOR INFLUENZA VACCINATION: Primary | ICD-10-CM

## 2022-11-17 NOTE — TELEPHONE ENCOUNTER
Dr Kobi Greco:    Patient dropped off forms for your review and signature  Please advise when forms are available for pickup

## 2022-12-14 ENCOUNTER — HOSPITAL ENCOUNTER (OUTPATIENT)
Facility: AMBULARY SURGERY CENTER | Age: 87
Setting detail: OUTPATIENT SURGERY
Discharge: HOME | End: 2022-12-14
Attending: PHYSICAL MEDICINE & REHABILITATION | Admitting: PHYSICAL MEDICINE & REHABILITATION

## 2022-12-14 VITALS
HEART RATE: 58 BPM | RESPIRATION RATE: 18 BRPM | TEMPERATURE: 97.9 F | DIASTOLIC BLOOD PRESSURE: 79 MMHG | OXYGEN SATURATION: 94 % | SYSTOLIC BLOOD PRESSURE: 187 MMHG

## 2022-12-14 DIAGNOSIS — J45.40 MODERATE PERSISTENT ASTHMA WITHOUT COMPLICATION: ICD-10-CM

## 2022-12-14 RX ORDER — DEXAMETHASONE SODIUM PHOSPHATE 10 MG/ML
INJECTION, SOLUTION INTRAMUSCULAR; INTRAVENOUS AS NEEDED
Status: DISCONTINUED | OUTPATIENT
Start: 2022-12-14 | End: 2022-12-14 | Stop reason: HOSPADM

## 2022-12-14 RX ORDER — LIDOCAINE HYDROCHLORIDE 10 MG/ML
INJECTION, SOLUTION EPIDURAL; INFILTRATION; INTRACAUDAL; PERINEURAL AS NEEDED
Status: DISCONTINUED | OUTPATIENT
Start: 2022-12-14 | End: 2022-12-14 | Stop reason: HOSPADM

## 2022-12-14 RX ORDER — BUPIVACAINE HYDROCHLORIDE 2.5 MG/ML
INJECTION, SOLUTION EPIDURAL; INFILTRATION; INTRACAUDAL AS NEEDED
Status: DISCONTINUED | OUTPATIENT
Start: 2022-12-14 | End: 2022-12-14 | Stop reason: HOSPADM

## 2022-12-14 RX ORDER — BECLOMETHASONE DIPROPIONATE HFA 80 UG/1
AEROSOL, METERED RESPIRATORY (INHALATION)
Qty: 11 G | Refills: 0 | Status: SHIPPED | OUTPATIENT
Start: 2022-12-14

## 2022-12-14 NOTE — OP NOTE
OPERATIVE REPORT  PATIENT NAME: Wendy Muro    :  1931  MRN: [id number]  Pt Location: [facility]PA[facility] 01    SURGERY DATE: 2022    Surgeon(s) and Role:     * Xochilt Vasquez, DO - Primary    Preop Diagnosis:  Lumbar disc disease with radiculopathy [M51 16]  Chronic pain syndrome [G89 4]    Post-Op Diagnosis Codes:     * Lumbar disc disease with radiculopathy [M51 16]     * Chronic pain syndrome [G89 4]    Procedure(s) (LRB):  L4 L5 S1  TRANSFORAMINAL epidural steroid injection ([id number]) (Right)      EBL: none  Specimens: not applicable  After discussing the risks, benefits, and alternatives to the procedure, the patient expressed understanding and wished to proceed  The patient was brought to the fluoroscopy suite and placed in the prone position  A procedural pause was conducted to verify: correct patient identity, procedure to be performed and as applicable, correct side and site, correct patient position, and availability of implants, special equipment and special requirements  After identifying the right L4 pedicle fluoroscopically with an oblique view, the skin was sterilely prepped and draped in the usual fashion using Chloraprep skin prep  The skin and subcutaneous tissues were anesthetized with 1% lidocaine  A 3 5 inch 22-gauge spinal needle was then advanced under fluoroscopic guidance to the neural foramen  Appropriate foraminal depth was determined with a lateral fluoroscopic view, and AP visualization confirmed needle positioning at approximately the 6 o'clock position relative to the pedicle  After negative aspiration, Omnipaque 240 contrast was injected using live fluoroscopy confirming appropriate transforaminal spread without evidence of intravascular or intrathecal uptake  Next, a 1 5 ml solution consisting of 10 mg of dexamethasone with 0 25% bupivacaine was injected slowly and incrementally into the epidural space   Following the injection the needle was withdrawn  The procedure was then repeated in the exact same way at the right L5 pedicle with a 3 5 inch 22-gauge spinal needle  The patient tolerated the procedure well and there were no apparent complications  The patient did not develop any new neurologic deficits  After appropriate observation, the patient was dismissed from the clinic in good condition under their own power          SIGNATURE: Tanika Gómez,   DATE: December 14, 2022  TIME: 10:22 AM

## 2022-12-14 NOTE — INTERVAL H&P NOTE
H&P reviewed  After examining the patient I find no changes in the patients condition since the H&P had been written      Vitals:    12/14/22 0953   BP: (!) 186/78   Pulse: 58   Resp: 18   Temp: 97 9 °F (36 6 °C)   SpO2: 97%

## 2022-12-14 NOTE — DISCHARGE INSTRUCTIONS
Epidural Steroid Injection   WHAT YOU NEED TO KNOW:   An epidural steroid injection ([facility]) is a procedure to inject steroid medicine into the epidural space  The epidural space is between your spinal cord and vertebrae  Steroids reduce inflammation and fluid buildup in your spine that may be causing pain  You may be given pain medicine along with the steroids  ACTIVITY  Do not drive or operate machinery today  No strenuous activity today - bending, lifting, etc   You may resume normal activites starting tomorrow - start slowly and as tolerated  You may shower today, but no tub baths or hot tubs  You may have numbness for several hours from the local anesthetic  Please use caution and common sense, especially with weight-bearing activities  CARE OF THE INJECTION SITE  If you have soreness or pain, apply ice to the area today (20 minutes on/20 minutes off)  Starting tomorrow, you may use warm, moist heat or ice if needed  You may have an increase or change in your discomfort for 36-48 hours after your treatment  Apply ice and continue with any pain medication you have been prescribed  Notify the [facility] if you have any of the following: redness, drainage, swelling, headache, stiff neck or fever above 100°F     SPECIAL INSTRUCTIONS  Our office will contact you in approximately 7 days for a progress report  MEDICATIONS  Continue to take all routine medications  Our office may have instructed you to hold some medications  As no general anesthesia was used in today's procedure, you should not experience any side effects related to anesthesia  If you are diabetic, the steroids used in today's injection may temporarily increase your blood sugar levels after the first few days after your injection  Please keep a close eye on your sugars and alert the doctor who manages your diabetes if your sugars are significantly high from your baseline or you are symptomatic       If you have a problem specifically related to your procedure, please call our office at [phone]  Problems not related to your procedure should be directed to your primary care physician

## 2022-12-21 ENCOUNTER — TELEPHONE (OUTPATIENT)
Dept: PAIN MEDICINE | Facility: CLINIC | Age: 87
End: 2022-12-21

## 2022-12-21 NOTE — TELEPHONE ENCOUNTER
Pt reports 60% improvement post inj  Pain level 4/10      Patient is aware I will call back next week to get an update.

## 2022-12-27 ENCOUNTER — OFFICE VISIT (OUTPATIENT)
Dept: PODIATRY | Facility: CLINIC | Age: 87
End: 2022-12-27

## 2022-12-27 VITALS — HEIGHT: 59 IN | BODY MASS INDEX: 27.01 KG/M2 | WEIGHT: 134 LBS | RESPIRATION RATE: 16 BRPM

## 2022-12-27 DIAGNOSIS — M25.571 ARTHRALGIA OF RIGHT FOOT: ICD-10-CM

## 2022-12-27 DIAGNOSIS — M77.42 METATARSALGIA OF BOTH FEET: Primary | ICD-10-CM

## 2022-12-27 DIAGNOSIS — B35.1 ONYCHOMYCOSIS: ICD-10-CM

## 2022-12-27 DIAGNOSIS — M77.41 METATARSALGIA OF BOTH FEET: Primary | ICD-10-CM

## 2022-12-27 DIAGNOSIS — M54.16 RADICULOPATHY OF LUMBAR REGION: ICD-10-CM

## 2022-12-27 NOTE — PROGRESS NOTES
Assessment/Plan:  Metatarsalgia secondary to radiculopathy, right greater than left  Pain upon ambulation  Mycosis of nail  Arthralgia      Plan  Patient advised on condition  She will follow-up with pain management  Today all nails debrided without pain or complication  Return for follow-up         Diagnoses and all orders for this visit:     Metatarsalgia of both feet     Radiculopathy of lumbar region     Hallux valgus of right foot     Arthralgia of right foot     Onychomycosis            Subjective:  Patient has pain in her feet  She has pain with ambulation shoe wear  Right greater than left  She is aware she has back problems causing foot problems  She is following up with pain management  She has stopped taking Cymbalta  She also gets pain in her toes ambulation and shoe wear  No history of trauma  She has been following with pain management           Allergies   Allergen Reactions   • Atorvastatin         Other reaction(s): Leg Cramps  Reaction Date: [id number];    • Azithromycin         Other reaction(s): Unknown Allergic Reaction  Reaction Date: [id number]; Annotation - [id number]: jjw   • Codeine Nausea Only       Reaction Date: [id number]; Annotation - [id number]: jjw   • Moxifloxacin         Other reaction(s): Diarrhea   • Penicillins Rash       Reaction Date: [id number]; Annotation - [id number]: jjw            Current Outpatient Medications:   •  albuterol (PROAIR HFA) 90 mcg/act inhaler, Inhale 2 puffs every 6 (six) hours as needed for wheezing, Disp: 1 Inhaler, Rfl: 2  •  amLODIPine (NORVASC) 5 mg tablet, Take 1 tablet by mouth twice daily (Patient taking differently: 5 mg daily), Disp: 60 tablet, Rfl: 6  •  beclomethasone (Qvar RediHaler) 80 MCG/ACT inhaler, Inhale 2 puffs  in the morning and 2 puffs in the evening  Rinse mouth after use   , Disp: 10 6 g, Rfl: 6  •  cycloSPORINE (RESTASIS) 0 05 % ophthalmic emulsion, Administer 1 drop to both eyes 2 (two) times a day, Disp: , Rfl:   • DULoxetine (CYMBALTA) 30 mg delayed release capsule, Take 1 capsule (30 mg total) by mouth daily, Disp: 30 capsule, Rfl: 0  •  famotidine (PEPCID) 40 MG tablet, Take 1 tablet by mouth once daily, Disp: 30 tablet, Rfl: 0  •  levothyroxine 50 mcg tablet, Take 1 tablet by mouth once daily, Disp: 90 tablet, Rfl: 0  •  metoprolol succinate (TOPROL-XL) 50 mg 24 hr tablet, TAKE 1 TABLET BY MOUTH ONCE DAILY AT BEDTIME, Disp: 90 tablet, Rfl: 1  •  rosuvastatin (CRESTOR) 5 mg tablet, Take 1 tablet by mouth once daily, Disp: 30 tablet, Rfl: 6  •  telmisartan-hydrochlorothiazide (MICARDIS HCT) 80-12 5 MG per tablet, Take 1 tablet by mouth once daily, Disp: 90 tablet, Rfl: 0         Patient Active Problem List   Diagnosis   • Asthma   • Benign essential hypertension   • Chronic kidney disease, stage II (mild)   • Mixed hyperlipidemia   • Osteoporosis   • Other specified hypothyroidism   • Abnormal CT of the abdomen   • Epigastric pain   • Abdominal bloating   • Delayed emergence from anesthesia   • Back pain   • Lipoma of back   • Heart murmur             Patient ID: Max Luevano is a 80 y o  female      HPI     The following portions of the patient's history were reviewed and updated as appropriate:      family history includes Aneurysm in her sister; Breast cancer (age of onset: [de-identified]) in her sister; Cancer in her daughter; Heart disease in her brother, brother, brother, daughter, father, and sister; Kidney disease in her sister; No Known Problems in her maternal aunt, maternal aunt, maternal aunt, paternal aunt, and paternal aunt        reports that she has never smoked  She has never used smokeless tobacco  She reports previous alcohol use  She reports that she does not use drugs         Objective:  Patient's shoes and socks removed     Foot ExamPhysical Exam       Patient's shoes and socks removed    Foot ExamPhysical Exam          General  General Appearance: appears stated age and healthy   Orientation: alert and oriented to person, place, and time   Affect: appropriate   Gait: antalgic         Right Foot/Ankle      Inspection and Palpation  Ecchymosis: none  Tenderness: bony tenderness    Pain patient has inflamed bunion   No evidence of a infection or gout  Swelling: dorsum   Arch: pes cavus  Claw Toes: second toe  Hallux valgus: no  Hallux limitus: yes  Skin Exam: callus and dry skin;      Neurovascular  Dorsalis pedis: 3+  Posterior tibial: 3+  Saphenous nerve sensation: normal  Tibial nerve sensation: normal  Superficial peroneal nerve sensation: normal  Deep peroneal nerve sensation: normal  Sural nerve sensation: normal        Left Foot/Ankle       Inspection and Palpation  Ecchymosis: none  Swelling: dorsum   Arch: pes planus  Hallux valgus: no  Hallux limitus: yes  Skin Exam: callus and dry skin;      Neurovascular  Dorsalis pedis: 3+  Posterior tibial: 3+  Saphenous nerve sensation: normal  Tibial nerve sensation: normal  Superficial peroneal nerve sensation: normal  Deep peroneal nerve sensation: normal  Sural nerve sensation: normal           Physical Exam  Vitals signs and nursing note reviewed  Constitutional:       Appearance: Normal appearance  Cardiovascular:      Pulses:           Dorsalis pedis pulses are 3+ on the right side and 3+ on the left side         Posterior tibial pulses are 3+ on the right side and 3+ on the left side  Musculoskeletal:      Right foot: Bony tenderness present  No bunion       Left foot: No bunion       Comments: X-ray of right foot demonstrates no evidence of fracture osteomyelitis   However middle phalanx of 2nd right toe demonstrates cystic change consistent with interosseous tophaceous change    Feet:      Right foot:      Skin integrity: Callus and dry skin present       Left foot:      Skin integrity: Callus and dry skin present     Skin:     General: Skin is warm       Patient has 4/5 L5 testing

## 2022-12-28 ENCOUNTER — TELEPHONE (OUTPATIENT)
Dept: FAMILY MEDICINE CLINIC | Facility: CLINIC | Age: 87
End: 2022-12-28

## 2022-12-28 DIAGNOSIS — Z12.31 ENCOUNTER FOR SCREENING MAMMOGRAM FOR BREAST CANCER: Primary | ICD-10-CM

## 2022-12-30 NOTE — TELEPHONE ENCOUNTER
.Caller: Pt     Doctor: Kendy    Reason for call: pain level is a 4-8/10 depending on time of day. The injection helped 70% for the back and for the foot 25%     Call back#: [phone]

## 2023-01-15 DIAGNOSIS — J45.40 MODERATE PERSISTENT ASTHMA WITHOUT COMPLICATION: ICD-10-CM

## 2023-01-16 RX ORDER — BECLOMETHASONE DIPROPIONATE HFA 80 UG/1
AEROSOL, METERED RESPIRATORY (INHALATION)
Qty: 11 G | Refills: 0 | Status: SHIPPED | OUTPATIENT
Start: 2023-01-16

## 2023-01-17 ENCOUNTER — OFFICE VISIT (OUTPATIENT)
Dept: PAIN MEDICINE | Facility: CLINIC | Age: 88
End: 2023-01-17

## 2023-01-17 VITALS
DIASTOLIC BLOOD PRESSURE: 80 MMHG | WEIGHT: 131.8 LBS | BODY MASS INDEX: 26.57 KG/M2 | HEIGHT: 59 IN | SYSTOLIC BLOOD PRESSURE: 150 MMHG | HEART RATE: 70 BPM

## 2023-01-17 DIAGNOSIS — M48.061 FORAMINAL STENOSIS OF LUMBAR REGION: ICD-10-CM

## 2023-01-17 DIAGNOSIS — E03.8 SUBCLINICAL HYPOTHYROIDISM: ICD-10-CM

## 2023-01-17 DIAGNOSIS — M51.16 LUMBAR DISC DISEASE WITH RADICULOPATHY: ICD-10-CM

## 2023-01-17 DIAGNOSIS — I10 ESSENTIAL HYPERTENSION: ICD-10-CM

## 2023-01-17 DIAGNOSIS — G89.4 CHRONIC PAIN SYNDROME: Primary | ICD-10-CM

## 2023-01-17 RX ORDER — TELMISARTAN AND HYDROCHLORTHIAZIDE 80; 12.5 MG/1; MG/1
TABLET ORAL
Qty: 90 TABLET | Refills: 0 | Status: SHIPPED | OUTPATIENT
Start: 2023-01-17

## 2023-01-17 RX ORDER — LEVOTHYROXINE SODIUM 0.05 MG/1
TABLET ORAL
Qty: 90 TABLET | Refills: 0 | Status: SHIPPED | OUTPATIENT
Start: 2023-01-17

## 2023-01-17 NOTE — PROGRESS NOTES
Pain Medicine Follow-Up Note    Assessment:  1  Chronic pain syndrome    2  Lumbar disc disease with radiculopathy    3  Foraminal stenosis of lumbar region        Plan:  My impressions and treatment recommendations were discussed in detail with the patient who verbalized understanding and had no further questions  Patient follows up in the office in regards to a right-sided L4-L5-S1 transforaminal epidural steroid injection  Patient is reporting ongoing relief of her pain symptoms  Patient states that she still has a cramping sensation in her right calf and feels a numbness in her right foot like she is stepping on a sock however she has been able to be much more active than she has in the past   Patient states that she is was able to rake her yard and use the wheelbarrow to move leaves which she states she was not able to do before the injection  Patient educated on the nature of epidural steroid injections and informed that when her pain returns and persists or she notices she is not as active as she was previously she should follow-up for a second epidural injection  Also discussed medication options patient has had side effects from gabapentin and duloxetine and is not interested in trialing Lyrica at this time  Patient verbalized understanding  Follow-up is planned in as needed time or sooner as warranted  Discharge instructions were provided  I personally saw and examined the patient and I agree with the above discussed plan of care  History of Present Illness:    Ngozi Garcia is a 80 y o  female who presents to [facility] for interval re-evaluation of the above stated pain complaints  The patient has a past medical and chronic pain history as outlined in the assessment section  She was last seen on 12/14/2022  At today's visit patient states that her pain symptoms have improved and currently has a pain score of 4 out of 10 on the verbal numeric pain scale  On 12/14/2022 patient had a right-sided L4-L5, L5-S1 transforaminal epidural steroid injection which is providing her ongoing relief of her pain symptoms  Patient currently reports 50% improvement at this time  The patient's pain is worse in the morning  The patient's pain is intermittent in nature  And the quality of the patient's pain is described as numbness and pins-and-needles  Patient indicates that her pain is located in her mid low back and her right lower leg  Other than as stated above, the patient denies any interval changes in medications, medical condition, mental condition, symptoms, or allergies since the last office visit  Review of Systems:    Review of Systems   Respiratory: Negative for shortness of breath  Cardiovascular: Negative for chest pain  Gastrointestinal: Negative for constipation, diarrhea, nausea and vomiting  Musculoskeletal: Positive for gait problem  Negative for arthralgias, joint swelling and myalgias  Joint stiffness   Skin: Negative for rash  Neurological: Negative for dizziness, seizures and weakness  All other systems reviewed and are negative  Past Medical History:   Diagnosis Date   • Anesthesia     groggy, difficult to awaken   • Asthma    • Chronic kidney disease     stage 2 mild   • Chronic pain disorder     lumbar herniated discs   • Dry eyes    • Hyperlipidemia    • Hypertension        Past Surgical History:   Procedure Laterality Date   • APPENDECTOMY     • CATARACT EXTRACTION Bilateral    • COLONOSCOPY N/A 10/25/2017    Procedure: COLONOSCOPY;  Surgeon: Juan Pablo Mcadams MD;  Location: [facility]; Service: Gastroenterology   • EPIDURAL BLOCK INJECTION Right 12/14/2022    Procedure: L4 L5 S1  TRANSFORAMINAL epidural steroid injection ([id number]);   Surgeon: Madison Abreu DO;  Location: [facility] MAIN OR;  Service: Pain Management    • JOINT REPLACEMENT Bilateral     partial knee   • OR EXC B9 LESION MRGN XCP SK TG T/A/L >4 0 CM Left 6/11/2021    Procedure: EXCISION  BIOPSY LESION/MASS BACK, LEFT BACK, LEFT CHEST WALL;  Surgeon: Israel Barajas MD;  Location: [address] [place] [address];  Service: General       Family History   Problem Relation Age of Onset   • Heart disease Father    • Aneurysm Sister    • Heart disease Brother    • Cancer Daughter         kidney    • Heart disease Brother    • Heart disease Brother    • Heart disease Sister         MI   • Kidney disease Sister    • Heart disease Daughter    • No Known Problems Maternal Aunt    • No Known Problems Maternal Aunt    • No Known Problems Maternal Aunt    • No Known Problems Paternal Aunt    • No Known Problems Paternal Aunt    • Breast cancer Sister [de-identified]       Social History     Occupational History   • Not on file   Tobacco Use   • Smoking status: Never   • Smokeless tobacco: Never   Vaping Use   • Vaping Use: Never used   Substance and Sexual Activity   • Alcohol use: Not Currently   • Drug use: No   • Sexual activity: Not on file         Current Outpatient Medications:   •  albuterol (PROAIR HFA) 90 mcg/act inhaler, Inhale 2 puffs every 6 (six) hours as needed for wheezing, Disp: 1 Inhaler, Rfl: 2  •  amLODIPine (NORVASC) 5 mg tablet, Take 1 tablet (5 mg total) by mouth daily, Disp: 90 tablet, Rfl: 1  •  cycloSPORINE (RESTASIS) 0 05 % ophthalmic emulsion, Administer 1 drop to both eyes 2 (two) times a day, Disp: , Rfl:   •  famotidine (PEPCID) 40 MG tablet, Take 1 tablet by mouth once daily, Disp: 30 tablet, Rfl: 0  •  levothyroxine 50 mcg tablet, Take 1 tablet by mouth once daily, Disp: 90 tablet, Rfl: 0  •  metoprolol succinate (TOPROL-XL) 50 mg 24 hr tablet, TAKE 1 TABLET BY MOUTH ONCE DAILY AT BEDTIME, Disp: 90 tablet, Rfl: 1  •  Qvar RediHaler 80 MCG/ACT inhaler, INHALE 2 PUFFS BY MOUTH IN THE MORNING AND 2 PUFFS IN THE EVENING   RINSE MOUTH AFTER USE, Disp: 11 g, Rfl: 0  •  rosuvastatin (CRESTOR) 5 mg tablet, Take 1 tablet by mouth once daily, Disp: 30 tablet, Rfl: 6  • telmisartan-hydrochlorothiazide (MICARDIS HCT) 80-12 5 MG per tablet, Take 1 tablet by mouth once daily, Disp: 90 tablet, Rfl: 0    Allergies   Allergen Reactions   • Atorvastatin      Other reaction(s): Leg Cramps  Reaction Date: [id number];    • Azithromycin      Other reaction(s): Unknown Allergic Reaction  Reaction Date: [id number]; Annotation - [id number]: jw   • Codeine Nausea Only     Reaction Date: [id number]; Annotation - [id number]: jjw   • Moxifloxacin      Other reaction(s): Diarrhea   • Penicillins Rash     Reaction Date: [id number]; Annotation - [id number]: j       Physical Exam:    /80   Pulse 70   Ht 4' 11" (1 499 m)   Wt 59 8 kg (131 lb 12 8 oz)   BMI 26 62 kg/m²     Constitutional:normal, well developed, well nourished, alert, in no distress and non-toxic and no overt pain behavior    Eyes:anicteric  HEENT:grossly intact  Neck:supple, symmetric, trachea midline and no masses   Pulmonary:even and unlabored  Cardiovascular:No edema or pitting edema present  Skin:Normal without rashes or lesions and well hydrated  Psychiatric:Mood and affect appropriate  Neurologic:Cranial Nerves II-XII grossly intact  Musculoskeletal:normal

## 2023-02-07 ENCOUNTER — HOSPITAL ENCOUNTER (OUTPATIENT)
Dept: RADIOLOGY | Facility: HOSPITAL | Age: 88
Discharge: HOME | End: 2023-02-07

## 2023-02-07 VITALS — WEIGHT: 130 LBS | BODY MASS INDEX: 26.21 KG/M2 | HEIGHT: 59 IN

## 2023-02-07 DIAGNOSIS — Z12.31 ENCOUNTER FOR SCREENING MAMMOGRAM FOR BREAST CANCER: ICD-10-CM

## 2023-02-15 DIAGNOSIS — J45.40 MODERATE PERSISTENT ASTHMA WITHOUT COMPLICATION: ICD-10-CM

## 2023-02-15 RX ORDER — BECLOMETHASONE DIPROPIONATE HFA 80 UG/1
AEROSOL, METERED RESPIRATORY (INHALATION)
Qty: 11 G | Refills: 0 | Status: SHIPPED | OUTPATIENT
Start: 2023-02-15

## 2023-02-16 DIAGNOSIS — I10 ESSENTIAL HYPERTENSION: ICD-10-CM

## 2023-02-17 RX ORDER — METOPROLOL SUCCINATE 50 MG/1
TABLET, EXTENDED RELEASE ORAL
Qty: 90 TABLET | Refills: 0 | Status: SHIPPED | OUTPATIENT
Start: 2023-02-17

## 2023-02-21 ENCOUNTER — APPOINTMENT (OUTPATIENT)
Dept: LAB | Facility: CLINIC | Age: 88
End: 2023-02-21

## 2023-02-21 DIAGNOSIS — E03.8 OTHER SPECIFIED HYPOTHYROIDISM: ICD-10-CM

## 2023-02-21 DIAGNOSIS — I10 BENIGN ESSENTIAL HYPERTENSION: ICD-10-CM

## 2023-02-21 DIAGNOSIS — R73.01 IMPAIRED FASTING GLUCOSE: ICD-10-CM

## 2023-02-21 DIAGNOSIS — M1A.9XX0 CHRONIC GOUT WITHOUT TOPHUS, UNSPECIFIED CAUSE, UNSPECIFIED SITE: ICD-10-CM

## 2023-02-21 LAB
ALBUMIN SERPL BCP-MCNC: 3.6 G/DL (ref 3.5–5)
ALP SERPL-CCNC: 88 U/L (ref 46–116)
ALT SERPL W P-5'-P-CCNC: 29 U/L (ref 12–78)
ANION GAP SERPL CALCULATED.3IONS-SCNC: 5 MMOL/L (ref 4–13)
AST SERPL W P-5'-P-CCNC: 24 U/L (ref 5–45)
BILIRUB SERPL-MCNC: 0.55 MG/DL (ref 0.2–1)
BUN SERPL-MCNC: 21 MG/DL (ref 5–25)
CALCIUM SERPL-MCNC: 9.9 MG/DL (ref 8.3–10.1)
CHLORIDE SERPL-SCNC: 107 MMOL/L (ref 96–108)
CHOLEST SERPL-MCNC: 189 MG/DL
CO2 SERPL-SCNC: 27 MMOL/L (ref 21–32)
CREAT SERPL-MCNC: 1.1 MG/DL (ref 0.6–1.3)
GFR SERPL CREATININE-BSD FRML MDRD: 43 ML/MIN/1.73SQ M
GLUCOSE P FAST SERPL-MCNC: 109 MG/DL (ref 65–99)
HDLC SERPL-MCNC: 56 MG/DL
LDLC SERPL CALC-MCNC: 81 MG/DL (ref 0–100)
NONHDLC SERPL-MCNC: 133 MG/DL
POTASSIUM SERPL-SCNC: 4.3 MMOL/L (ref 3.5–5.3)
PROT SERPL-MCNC: 7 G/DL (ref 6.4–8.4)
SODIUM SERPL-SCNC: 139 MMOL/L (ref 135–147)
TRIGL SERPL-MCNC: 258 MG/DL
TSH SERPL DL<=0.05 MIU/L-ACNC: 2.65 UIU/ML (ref 0.45–4.5)
URATE SERPL-MCNC: 8.8 MG/DL (ref 2–7.5)

## 2023-02-23 LAB
EST. AVERAGE GLUCOSE BLD GHB EST-MCNC: 131 MG/DL
HBA1C MFR BLD: 6.2 %

## 2023-02-27 DIAGNOSIS — E78.2 MIXED HYPERLIPIDEMIA: ICD-10-CM

## 2023-02-27 RX ORDER — ROSUVASTATIN CALCIUM 5 MG/1
TABLET, COATED ORAL
Qty: 30 TABLET | Refills: 0 | Status: SHIPPED | OUTPATIENT
Start: 2023-02-27

## 2023-03-04 ENCOUNTER — OFFICE VISIT (OUTPATIENT)
Dept: URGENT CARE | Facility: CLINIC | Age: 88
End: 2023-03-04

## 2023-03-04 VITALS
HEIGHT: 59 IN | DIASTOLIC BLOOD PRESSURE: 72 MMHG | TEMPERATURE: 99.7 F | SYSTOLIC BLOOD PRESSURE: 156 MMHG | BODY MASS INDEX: 26.21 KG/M2 | OXYGEN SATURATION: 95 % | RESPIRATION RATE: 14 BRPM | WEIGHT: 130 LBS | HEART RATE: 82 BPM

## 2023-03-04 DIAGNOSIS — R05.1 ACUTE COUGH: ICD-10-CM

## 2023-03-04 DIAGNOSIS — J06.9 VIRAL UPPER RESPIRATORY TRACT INFECTION: Primary | ICD-10-CM

## 2023-03-04 RX ORDER — BENZONATATE 200 MG/1
200 CAPSULE ORAL 3 TIMES DAILY PRN
Qty: 20 CAPSULE | Refills: 0 | Status: SHIPPED | OUTPATIENT
Start: 2023-03-04

## 2023-03-04 NOTE — PROGRESS NOTES
[facility] [facility]        NAME: Amena Valencia is a 80 y o  female  : 1931    MRN: [id number]  DATE: 2023  TIME: 3:01 PM    Assessment and Plan   Acute cough [R05 1]  1  Acute cough  benzonatate (TESSALON) 200 MG capsule      2  Viral upper respiratory tract infection          Patient Instructions   Viral upper respiratory infection  Recommend flonase nasal spray for postnasal drip/cough  rx tessalon alina sent via EMR  Warm salt water gargles  Rest, fluids and supportive care  May benefit from a cool mist humidifier on night stand  Tylenol/ibuprofen as needed for pain/fever    Follow up with PCP in 3-5 days  Proceed to  ER if symptoms worsen  Chief Complaint     Chief Complaint   Patient presents with   • Fever     Pt reports cough, nasal congestion and fever x5 days         History of Present Illness       Foster Coello is a 80-year-old female who presents to clinic complaining of cough x5 days  She describes it as productive with a yellow to white sputum  She also is complaining of bilateral ear pain decreased appetite fever sore throat and nasal congestion  She states the fever was Tmax of 100 6 °F this morning  She denies any chills, fatigue, myalgias, rhinorrhea, shortness of breath, nausea, vomiting, diarrhea, loss of taste or smell, recent travel, or exposure to anyone known COVID-positive  Review of Systems   Review of Systems   Constitutional: Positive for appetite change and fever  HENT: Positive for congestion, ear pain and sore throat  Negative for rhinorrhea, sinus pressure and sinus pain  Respiratory: Positive for cough  Negative for shortness of breath  Gastrointestinal: Negative for diarrhea, nausea and vomiting  Musculoskeletal: Negative for myalgias  Neurological: Negative for headaches           Current Medications       Current Outpatient Medications:   •  benzonatate (TESSALON) 200 MG capsule, Take 1 capsule (200 mg total) by mouth 3 (three) times a day as needed for cough, Disp: 20 capsule, Rfl: 0  •  albuterol (PROAIR HFA) 90 mcg/act inhaler, Inhale 2 puffs every 6 (six) hours as needed for wheezing, Disp: 1 Inhaler, Rfl: 2  •  amLODIPine (NORVASC) 5 mg tablet, Take 1 tablet (5 mg total) by mouth daily, Disp: 90 tablet, Rfl: 1  •  cycloSPORINE (RESTASIS) 0 05 % ophthalmic emulsion, Administer 1 drop to both eyes 2 (two) times a day, Disp: , Rfl:   •  famotidine (PEPCID) 40 MG tablet, Take 1 tablet by mouth once daily, Disp: 30 tablet, Rfl: 0  •  levothyroxine 50 mcg tablet, Take 1 tablet by mouth once daily, Disp: 90 tablet, Rfl: 0  •  metoprolol succinate (TOPROL-XL) 50 mg 24 hr tablet, TAKE 1 TABLET BY MOUTH ONCE DAILY AT BEDTIME, Disp: 90 tablet, Rfl: 0  •  Qvar RediHaler 80 MCG/ACT inhaler, INHALE 2 PUFFS BY MOUTH IN THE MORNING AND 2 PUFFS IN THE EVENING ,  RINSE  MOUTH  AFTER  USE, Disp: 11 g, Rfl: 0  •  rosuvastatin (CRESTOR) 5 mg tablet, Take 1 tablet by mouth once daily, Disp: 30 tablet, Rfl: 0  •  telmisartan-hydrochlorothiazide (MICARDIS HCT) 80-12 5 MG per tablet, Take 1 tablet by mouth once daily, Disp: 90 tablet, Rfl: 0    Current Allergies     Allergies as of 03/04/2023 - Reviewed 03/04/2023   Allergen Reaction Noted   • Atorvastatin  11/15/2010   • Azithromycin  01/07/2004   • Codeine Nausea Only 01/07/2004   • Moxifloxacin  04/15/2016   • Penicillins Rash 01/07/2004            The following portions of the patient's history were reviewed and updated as appropriate: allergies, current medications, past family history, past medical history, past social history, past surgical history and problem list      Past Medical History:   Diagnosis Date   • Anesthesia     groggy, difficult to awaken   • Asthma    • Chronic kidney disease     stage 2 mild   • Chronic pain disorder     lumbar herniated discs   • Dry eyes    • Hyperlipidemia    • Hypertension        Past Surgical History:   Procedure Laterality Date   • APPENDECTOMY     • CATARACT EXTRACTION Bilateral    • COLONOSCOPY N/A 10/25/2017    Procedure: COLONOSCOPY;  Surgeon: Sravan Delarosa MD;  Location: [facility]; Service: Gastroenterology   • EPIDURAL BLOCK INJECTION Right 12/14/2022    Procedure: L4 L5 S1  TRANSFORAMINAL epidural steroid injection ([id number]); Surgeon: Taz Walters DO;  Location: [facility] MAIN OR;  Service: Pain Management    • JOINT REPLACEMENT Bilateral     partial knee   • CA EXC B9 LESION MRGN XCP SK TG T/A/L >4 0 CM Left 6/11/2021    Procedure: EXCISION  BIOPSY LESION/MASS BACK, LEFT BACK, LEFT CHEST WALL;  Surgeon: Cindy Guevara MD;  Location: [facility] OR;  Service: General       Family History   Problem Relation Age of Onset   • Heart disease Father    • Aneurysm Sister    • Heart disease Brother    • Cancer Daughter         kidney    • Heart disease Brother    • Heart disease Brother    • Heart disease Sister         MI   • Kidney disease Sister    • Heart disease Daughter    • No Known Problems Maternal Aunt    • No Known Problems Maternal Aunt    • No Known Problems Maternal Aunt    • No Known Problems Paternal Aunt    • No Known Problems Paternal Aunt    • Breast cancer Sister [de-identified]         Medications have been verified  Objective   /72   Pulse 82   Temp 99 7 °F (37 6 °C)   Resp 14   Ht 4' 11" (1 499 m)   Wt 59 kg (130 lb)   SpO2 95%   BMI 26 26 kg/m²   No LMP recorded  Patient is postmenopausal        Physical Exam     Physical Exam  Vitals and nursing note reviewed  Constitutional:       General: She is not in acute distress  Appearance: Normal appearance  She is not ill-appearing  HENT:      Right Ear: Tympanic membrane, ear canal and external ear normal       Left Ear: Tympanic membrane, ear canal and external ear normal       Nose: Congestion present  Mouth/Throat:      Mouth: Mucous membranes are moist       Pharynx: No oropharyngeal exudate or posterior oropharyngeal erythema     Cardiovascular:      Rate and Rhythm: Normal rate and regular rhythm  Heart sounds: Normal heart sounds  Pulmonary:      Effort: Pulmonary effort is normal  No respiratory distress  Breath sounds: Normal breath sounds  No stridor  No wheezing, rhonchi or rales  Lymphadenopathy:      Cervical: No cervical adenopathy  Neurological:      Mental Status: She is alert and oriented to person, place, and time     Psychiatric:         Mood and Affect: Mood normal          Behavior: Behavior normal

## 2023-03-13 ENCOUNTER — APPOINTMENT (OUTPATIENT)
Dept: RADIOLOGY | Facility: CLINIC | Age: 88
End: 2023-03-13

## 2023-03-13 ENCOUNTER — OFFICE VISIT (OUTPATIENT)
Dept: FAMILY MEDICINE CLINIC | Facility: CLINIC | Age: 88
End: 2023-03-13

## 2023-03-13 VITALS
WEIGHT: 129.6 LBS | HEART RATE: 70 BPM | SYSTOLIC BLOOD PRESSURE: 132 MMHG | RESPIRATION RATE: 18 BRPM | TEMPERATURE: 97.2 F | DIASTOLIC BLOOD PRESSURE: 72 MMHG | BODY MASS INDEX: 26.13 KG/M2 | HEIGHT: 59 IN

## 2023-03-13 DIAGNOSIS — M10.9 ACUTE GOUT, UNSPECIFIED CAUSE, UNSPECIFIED SITE: ICD-10-CM

## 2023-03-13 DIAGNOSIS — R05.9 COUGH IN ADULT: Primary | ICD-10-CM

## 2023-03-13 DIAGNOSIS — M79.89 SWELLING OF RIGHT LITTLE FINGER: ICD-10-CM

## 2023-03-13 DIAGNOSIS — R05.9 COUGH IN ADULT: ICD-10-CM

## 2023-03-13 RX ORDER — PREDNISONE 20 MG/1
40 TABLET ORAL DAILY
Qty: 10 TABLET | Refills: 0 | Status: SHIPPED | OUTPATIENT
Start: 2023-03-13 | End: 2023-03-18

## 2023-03-13 RX ORDER — ALBUTEROL SULFATE 90 UG/1
2 AEROSOL, METERED RESPIRATORY (INHALATION) EVERY 6 HOURS PRN
Qty: 18 G | Refills: 1 | Status: SHIPPED | OUTPATIENT
Start: 2023-03-13

## 2023-03-13 RX ORDER — ALLOPURINOL 100 MG/1
100 TABLET ORAL DAILY
Qty: 90 TABLET | Refills: 3 | Status: SHIPPED | OUTPATIENT
Start: 2023-03-13

## 2023-03-13 RX ORDER — FLUTICASONE PROPIONATE 50 MCG
SPRAY, SUSPENSION (ML) NASAL
COMMUNITY
Start: 2023-03-07

## 2023-03-13 NOTE — PROGRESS NOTES
Chief Complaint   Patient presents with   • Cough   • Finger Swelling        Patient ID: Max Luevano is a 80 y o  female  Cough  This is a new problem  The current episode started 1 to 4 weeks ago  The problem has been gradually improving  The problem occurs every few minutes  The cough is productive of sputum  Associated symptoms include nasal congestion  Pertinent negatives include no chest pain, chills, ear congestion, ear pain, fever, headaches, heartburn, hemoptysis, myalgias, postnasal drip, rash, rhinorrhea, sore throat, shortness of breath, sweats, weight loss or wheezing  Nothing aggravates the symptoms  She has tried OTC inhaler and prescription cough suppressant (Zpack) for the symptoms  The treatment provided mild relief  Her past medical history is significant for asthma  There is no history of bronchiectasis, bronchitis, COPD, emphysema, environmental allergies or pneumonia  The following portions of the patient's history were reviewed and updated as appropriate: allergies, current medications, past family history, past medical history, past social history, past surgical history and problem list     Review of Systems   Constitutional: Negative  Negative for chills, fever and weight loss  HENT: Negative for ear pain, postnasal drip, rhinorrhea and sore throat  Respiratory: Positive for cough  Negative for hemoptysis, shortness of breath and wheezing  Cardiovascular: Negative  Negative for chest pain  Gastrointestinal: Negative  Negative for heartburn  Genitourinary: Negative  Musculoskeletal: Positive for joint swelling (L 5th finger  -  h/o Gout -  recent uric acid level is 8 8 -  was on Allopurinol in the past )  Negative for myalgias  Skin: Negative  Negative for rash  Allergic/Immunologic: Negative for environmental allergies  Neurological: Negative  Negative for headaches         Current Outpatient Medications   Medication Sig Dispense Refill   • albuterol (PROAIR HFA) 90 mcg/act inhaler Inhale 2 puffs every 6 (six) hours as needed for wheezing 1 Inhaler 2   • amLODIPine (NORVASC) 5 mg tablet Take 1 tablet (5 mg total) by mouth daily 90 tablet 1   • cycloSPORINE (RESTASIS) 0 05 % ophthalmic emulsion Administer 1 drop to both eyes 2 (two) times a day     • famotidine (PEPCID) 40 MG tablet Take 1 tablet by mouth once daily 30 tablet 0   • fluticasone (FLONASE) 50 mcg/act nasal spray USE 1 TO 2 SPRAY(S) IN EACH NOSTRIL TWICE DAILY FOR 30 DAYS     • levothyroxine 50 mcg tablet Take 1 tablet by mouth once daily 90 tablet 0   • metoprolol succinate (TOPROL-XL) 50 mg 24 hr tablet TAKE 1 TABLET BY MOUTH ONCE DAILY AT BEDTIME 90 tablet 0   • Qvar RediHaler 80 MCG/ACT inhaler INHALE 2 PUFFS BY MOUTH IN THE MORNING AND 2 PUFFS IN THE EVENING ,  RINSE  MOUTH  AFTER  USE 11 g 0   • rosuvastatin (CRESTOR) 5 mg tablet Take 1 tablet by mouth once daily 30 tablet 0   • telmisartan-hydrochlorothiazide (MICARDIS HCT) 80-12 5 MG per tablet Take 1 tablet by mouth once daily 90 tablet 0     No current facility-administered medications for this visit  Objective:    /72 (BP Location: Left arm, Patient Position: Sitting, Cuff Size: Standard)   Pulse 70   Temp (!) 97 2 °F (36 2 °C)   Resp 18   Ht 4' 11" (1 499 m)   Wt 58 8 kg (129 lb 9 6 oz)   BMI 26 18 kg/m²        Physical Exam  Constitutional:       General: She is not in acute distress  Appearance: She is not ill-appearing  Cardiovascular:      Rate and Rhythm: Normal rate and regular rhythm  Pulmonary:      Effort: Pulmonary effort is normal  No respiratory distress  Breath sounds: Examination of the right-middle field reveals rhonchi  Examination of the left-middle field reveals rhonchi  Examination of the right-lower field reveals rhonchi  Examination of the left-lower field reveals rhonchi  Rhonchi present  No wheezing or rales  Musculoskeletal:      Left hand: Swelling (L 5th finger ) present  Right lower leg: No edema  Left lower leg: No edema  Neurological:      Mental Status: She is alert  Labs in chart were reviewed  Assessment/Plan:         Diagnoses and all orders for this visit:    Cough in adult  -     predniSONE 20 mg tablet; Take 2 tablets (40 mg total) by mouth daily for 5 days  -     XR chest pa & lateral; Future  -     albuterol (ProAir HFA) 90 mcg/act inhaler; Inhale 2 puffs every 6 (six) hours as needed for wheezing    Acute gout, unspecified cause, unspecified site  -     Will restart after 2-3 wks allopurinol (ZYLOPRIM) 100 mg tablet; Take 1 tablet (100 mg total) by mouth daily    Swelling of right little finger  -     predniSONE 20 mg tablet;  Take 2 tablets (40 mg total) by mouth daily for 5 days    Other orders  -     fluticasone (FLONASE) 50 mcg/act nasal spray; USE 1 TO 2 SPRAY(S) IN EACH NOSTRIL TWICE DAILY FOR 30 DAYS        Phone f/u in 5 days   rto in 1 m                     Yaya Chan MD

## 2023-03-14 ENCOUNTER — TELEPHONE (OUTPATIENT)
Dept: FAMILY MEDICINE CLINIC | Facility: CLINIC | Age: 88
End: 2023-03-14

## 2023-03-14 DIAGNOSIS — R05.9 COUGH IN ADULT: Primary | ICD-10-CM

## 2023-03-14 RX ORDER — DOXYCYCLINE HYCLATE 100 MG/1
100 CAPSULE ORAL EVERY 12 HOURS SCHEDULED
Qty: 14 CAPSULE | Refills: 0 | Status: SHIPPED | OUTPATIENT
Start: 2023-03-14 | End: 2023-03-21

## 2023-03-17 NOTE — TELEPHONE ENCOUNTER
FYI: Patient feels as if she is slowly improving  Breathing is still a bit labored and her temp was 94 9

## 2023-03-19 DIAGNOSIS — J45.40 MODERATE PERSISTENT ASTHMA WITHOUT COMPLICATION: ICD-10-CM

## 2023-03-20 RX ORDER — BECLOMETHASONE DIPROPIONATE HFA 80 UG/1
AEROSOL, METERED RESPIRATORY (INHALATION)
Qty: 11 G | Refills: 0 | Status: SHIPPED | OUTPATIENT
Start: 2023-03-20

## 2023-03-21 ENCOUNTER — OFFICE VISIT (OUTPATIENT)
Dept: FAMILY MEDICINE CLINIC | Facility: CLINIC | Age: 88
End: 2023-03-21

## 2023-03-21 VITALS
RESPIRATION RATE: 18 BRPM | BODY MASS INDEX: 25.72 KG/M2 | OXYGEN SATURATION: 97 % | HEART RATE: 68 BPM | WEIGHT: 127.6 LBS | DIASTOLIC BLOOD PRESSURE: 72 MMHG | TEMPERATURE: 97.4 F | HEIGHT: 59 IN | SYSTOLIC BLOOD PRESSURE: 132 MMHG

## 2023-03-21 DIAGNOSIS — J18.9 PNEUMONIA OF RIGHT LUNG DUE TO INFECTIOUS ORGANISM, UNSPECIFIED PART OF LUNG: Primary | ICD-10-CM

## 2023-03-21 NOTE — PROGRESS NOTES
Chief Complaint   Patient presents with   • Follow-up     Lung check due to pneumonia        Patient ID: Nanda Schwartz is a 80 y o  female  HPI  Pt is seeing for f/u R middle lobe pneumonia  -  Finished Doxy -  Feeling much better -  Minimal cough is still present -  No Sob, no wheezing, energy level is better     The following portions of the patient's history were reviewed and updated as appropriate: allergies, current medications, past family history, past medical history, past social history, past surgical history and problem list     Review of Systems   Constitutional: Negative for fever  Respiratory: Positive for cough  Negative for chest tightness, shortness of breath and wheezing  Cardiovascular: Negative  Gastrointestinal: Negative  Current Outpatient Medications   Medication Sig Dispense Refill   • albuterol (ProAir HFA) 90 mcg/act inhaler Inhale 2 puffs every 6 (six) hours as needed for wheezing 18 g 1   • allopurinol (ZYLOPRIM) 100 mg tablet Take 1 tablet (100 mg total) by mouth daily 90 tablet 3   • amLODIPine (NORVASC) 5 mg tablet Take 1 tablet (5 mg total) by mouth daily 90 tablet 1   • cycloSPORINE (RESTASIS) 0 05 % ophthalmic emulsion Administer 1 drop to both eyes 2 (two) times a day     • famotidine (PEPCID) 40 MG tablet Take 1 tablet by mouth once daily 30 tablet 0   • fluticasone (FLONASE) 50 mcg/act nasal spray USE 1 TO 2 SPRAY(S) IN EACH NOSTRIL TWICE DAILY FOR 30 DAYS     • levothyroxine 50 mcg tablet Take 1 tablet by mouth once daily 90 tablet 0   • metoprolol succinate (TOPROL-XL) 50 mg 24 hr tablet TAKE 1 TABLET BY MOUTH ONCE DAILY AT BEDTIME 90 tablet 0   • Qvar RediHaler 80 MCG/ACT inhaler INHALE 2 PUFFS BY MOUTH IN THE MORNING AND 2 PUFFS IN THE EVENING   RINSE MOUTH AFTER USE 11 g 0   • rosuvastatin (CRESTOR) 5 mg tablet Take 1 tablet by mouth once daily 30 tablet 0   • telmisartan-hydrochlorothiazide (MICARDIS HCT) 80-12 5 MG per tablet Take 1 tablet by mouth once daily 90 tablet 0   • doxycycline hyclate (VIBRAMYCIN) 100 mg capsule Take 1 capsule (100 mg total) by mouth every 12 (twelve) hours for 7 days (Patient not taking: Reported on 3/21/2023) 14 capsule 0     No current facility-administered medications for this visit  Objective:    /72 (BP Location: Left arm, Patient Position: Sitting, Cuff Size: Standard)   Pulse 68   Temp (!) 97 4 °F (36 3 °C)   Resp 18   Ht 4' 11" (1 499 m)   Wt 57 9 kg (127 lb 9 6 oz)   SpO2 97%   BMI 25 77 kg/m²        Physical Exam  Constitutional:       Appearance: She is not ill-appearing  Cardiovascular:      Rate and Rhythm: Normal rate and regular rhythm  Pulmonary:      Effort: Pulmonary effort is normal  No respiratory distress  Breath sounds: No wheezing, rhonchi or rales  Neurological:      Mental Status: She is alert                   Assessment/Plan:         Diagnoses and all orders for this visit:    Pneumonia of right lung due to infectious organism, unspecified part of lung  -     XR chest pa & lateral; Future  Improved    rto prn                         Padmaja Montez MD

## 2023-03-24 ENCOUNTER — OFFICE VISIT (OUTPATIENT)
Dept: PODIATRY | Facility: CLINIC | Age: 88
End: 2023-03-24

## 2023-03-24 VITALS
BODY MASS INDEX: 25.6 KG/M2 | WEIGHT: 127 LBS | SYSTOLIC BLOOD PRESSURE: 132 MMHG | HEIGHT: 59 IN | DIASTOLIC BLOOD PRESSURE: 72 MMHG | RESPIRATION RATE: 17 BRPM

## 2023-03-24 DIAGNOSIS — I70.209 PERIPHERAL ARTERIOSCLEROSIS (HCC): Primary | ICD-10-CM

## 2023-03-24 DIAGNOSIS — B35.1 ONYCHOMYCOSIS: ICD-10-CM

## 2023-03-24 DIAGNOSIS — M79.671 PAIN IN BOTH FEET: ICD-10-CM

## 2023-03-24 DIAGNOSIS — M79.672 PAIN IN BOTH FEET: ICD-10-CM

## 2023-03-24 NOTE — PROGRESS NOTES
Assessment/Plan:  Metatarsalgia secondary to radiculopathy, right greater than left   Pain upon ambulation   Mycosis of nail   Arthralgia  Peripheral artery disease       Plan  Chart reviewed   Patient advised on condition    She will follow-up with pain management   Today all nails debrided without pain or complication   Return for follow-up         Diagnoses and all orders for this visit:     Metatarsalgia of both feet     Radiculopathy of lumbar region     Hallux valgus of right foot     Arthralgia of right foot     Onychomycosis    Peripheral artery disease  Pain upon ambulation            Subjective:  Patient has pain in her feet   She has pain with ambulation shoe wear   Right greater than left   She is aware she has back problems causing foot problems   She is following up with pain management   She has stopped taking Cymbalta   She also gets pain in her toes ambulation and shoe wear   No history of trauma  She has been following with pain management               Allergies   Allergen Reactions   • Atorvastatin         Other reaction(s): Leg Cramps  Reaction Date: [id number];    • Azithromycin         Other reaction(s): Unknown Allergic Reaction  Reaction Date: [id number]; Annotation - [id number]: jjw   • Codeine Nausea Only       Reaction Date: [id number]; Annotation - [id number]: jjw   • Moxifloxacin         Other reaction(s): Diarrhea   • Penicillins Rash       Reaction Date: [id number]; Annotation - [id number]: jdeisyw            Current Outpatient Medications:   •  albuterol (PROAIR HFA) 90 mcg/act inhaler, Inhale 2 puffs every 6 (six) hours as needed for wheezing, Disp: 1 Inhaler, Rfl: 2  •  amLODIPine (NORVASC) 5 mg tablet, Take 1 tablet by mouth twice daily (Patient taking differently: 5 mg daily), Disp: 60 tablet, Rfl: 6  •  beclomethasone (Qvar RediHaler) 80 MCG/ACT inhaler, Inhale 2 puffs  in the morning and 2 puffs in the evening  Rinse mouth after use   , Disp: 10 6 g, Rfl: 6  •  cycloSPORINE (RESTASIS) 0 05 % ophthalmic emulsion, Administer 1 drop to both eyes 2 (two) times a day, Disp: , Rfl:   •  DULoxetine (CYMBALTA) 30 mg delayed release capsule, Take 1 capsule (30 mg total) by mouth daily, Disp: 30 capsule, Rfl: 0  •  famotidine (PEPCID) 40 MG tablet, Take 1 tablet by mouth once daily, Disp: 30 tablet, Rfl: 0  •  levothyroxine 50 mcg tablet, Take 1 tablet by mouth once daily, Disp: 90 tablet, Rfl: 0  •  metoprolol succinate (TOPROL-XL) 50 mg 24 hr tablet, TAKE 1 TABLET BY MOUTH ONCE DAILY AT BEDTIME, Disp: 90 tablet, Rfl: 1  •  rosuvastatin (CRESTOR) 5 mg tablet, Take 1 tablet by mouth once daily, Disp: 30 tablet, Rfl: 6  •  telmisartan-hydrochlorothiazide (MICARDIS HCT) 80-12 5 MG per tablet, Take 1 tablet by mouth once daily, Disp: 90 tablet, Rfl: 0           Patient Active Problem List   Diagnosis   • Asthma   • Benign essential hypertension   • Chronic kidney disease, stage II (mild)   • Mixed hyperlipidemia   • Osteoporosis   • Other specified hypothyroidism   • Abnormal CT of the abdomen   • Epigastric pain   • Abdominal bloating   • Delayed emergence from anesthesia   • Back pain   • Lipoma of back   • Heart murmur             Patient ID: Nicki Escobedo is a 80 y  o  female      HPI     The following portions of the patient's history were reviewed and updated as appropriate:      family history includes Aneurysm in her sister; Breast cancer (age of onset: [de-identified]) in her sister; Cancer in her daughter; Heart disease in her brother, brother, brother, daughter, father, and sister; Kidney disease in her sister; No Known Problems in her maternal aunt, maternal aunt, maternal aunt, paternal aunt, and paternal aunt        reports that she has never smoked  She has never used smokeless tobacco  She reports previous alcohol use   She reports that she does not use drugs         Objective:  Patient's shoes and socks removed    Foot ExamPhysical Exam       Patient's shoes and socks removed    Foot ExamPhysical Exam          General  General Appearance: appears stated age and healthy   Orientation: alert and oriented to person, place, and time   Affect: appropriate   Gait: antalgic         Right Foot/Ankle      Inspection and Palpation  Ecchymosis: none  Tenderness: bony tenderness    Pain patient has inflamed bunion   No evidence of a infection or gout  Swelling: dorsum   Arch: pes cavus  Claw Toes: second toe  Hallux valgus: no  Hallux limitus: yes  Skin Exam: callus and dry skin;      Neurovascular  Dorsalis pedis: 3+  Posterior tibial: 3+  Saphenous nerve sensation: normal  Tibial nerve sensation: normal  Superficial peroneal nerve sensation: normal  Deep peroneal nerve sensation: normal  Sural nerve sensation: normal        Left Foot/Ankle       Inspection and Palpation  Ecchymosis: none  Swelling: dorsum   Arch: pes planus  Hallux valgus: no  Hallux limitus: yes  Skin Exam: callus and dry skin;      Neurovascular  Dorsalis pedis: 3+  Posterior tibial: 3+  Saphenous nerve sensation: normal  Tibial nerve sensation: normal  Superficial peroneal nerve sensation: normal  Deep peroneal nerve sensation: normal  Sural nerve sensation: normal           Physical Exam  Vitals signs and nursing note reviewed  Constitutional:       Appearance: Normal appearance  Cardiovascular:      Pulses:           Dorsalis pedis pulses are 3+ on the right side and 3+ on the left side         Posterior tibial pulses are 3+ on the right side and 3+ on the left side  Musculoskeletal:      Right foot: Bony tenderness present  No bunion       Left foot: No bunion       Comments: X-ray of right foot demonstrates no evidence of fracture osteomyelitis   However middle phalanx of 2nd right toe demonstrates cystic change consistent with interosseous tophaceous change    Feet:      Right foot:      Skin integrity: Callus and dry skin present       Left foot:      Skin integrity: Callus and dry skin present     Skin:     General: Skin is warm       Patient has 4/5 L5 testing

## 2023-04-04 ENCOUNTER — APPOINTMENT (OUTPATIENT)
Dept: RADIOLOGY | Facility: CLINIC | Age: 88
End: 2023-04-04

## 2023-04-04 DIAGNOSIS — J18.9 PNEUMONIA OF RIGHT LUNG DUE TO INFECTIOUS ORGANISM, UNSPECIFIED PART OF LUNG: ICD-10-CM

## 2023-04-06 ENCOUNTER — TELEPHONE (OUTPATIENT)
Dept: FAMILY MEDICINE CLINIC | Facility: CLINIC | Age: 88
End: 2023-04-06

## 2023-05-02 ENCOUNTER — OFFICE VISIT (OUTPATIENT)
Dept: FAMILY MEDICINE CLINIC | Facility: CLINIC | Age: 88
End: 2023-05-02

## 2023-05-02 VITALS
BODY MASS INDEX: 26.25 KG/M2 | HEIGHT: 59 IN | SYSTOLIC BLOOD PRESSURE: 148 MMHG | RESPIRATION RATE: 18 BRPM | TEMPERATURE: 95.9 F | DIASTOLIC BLOOD PRESSURE: 78 MMHG | HEART RATE: 68 BPM | WEIGHT: 130.2 LBS

## 2023-05-02 DIAGNOSIS — S00.06XA TICK BITE OF SCALP, INITIAL ENCOUNTER: ICD-10-CM
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
## 2023-05-02 DIAGNOSIS — R21 RASH: Primary | ICD-10-CM

## 2023-05-02 NOTE — PROGRESS NOTES
"Chief Complaint   Patient presents with    Tick Bite        Patient ID: Erna Mcmahon is a 80 y o  female  HPI  Pt is seeing for \"juan manuel on the scalp\" after tick bite  Removed 2 tick few days ago -  Was gardening -  Ticks were attached < 12 h     The following portions of the patient's history were reviewed and updated as appropriate: allergies, current medications, past family history, past medical history, past social history, past surgical history and problem list     Review of Systems   Constitutional: Negative  Respiratory: Negative  Cardiovascular: Negative  Musculoskeletal: Negative  Current Outpatient Medications   Medication Sig Dispense Refill    albuterol (ProAir HFA) 90 mcg/act inhaler Inhale 2 puffs every 6 (six) hours as needed for wheezing 18 g 1    allopurinol (ZYLOPRIM) 100 mg tablet Take 1 tablet (100 mg total) by mouth daily 90 tablet 3    amLODIPine (NORVASC) 5 mg tablet Take 1 tablet (5 mg total) by mouth daily 90 tablet 1    cycloSPORINE (RESTASIS) 0 05 % ophthalmic emulsion Administer 1 drop to both eyes 2 (two) times a day      famotidine (PEPCID) 40 MG tablet Take 1 tablet by mouth once daily 30 tablet 0    fluticasone (FLONASE) 50 mcg/act nasal spray USE 1 TO 2 SPRAY(S) IN EACH NOSTRIL TWICE DAILY FOR 30 DAYS      levothyroxine 50 mcg tablet Take 1 tablet by mouth once daily 90 tablet 1    metoprolol succinate (TOPROL-XL) 50 mg 24 hr tablet TAKE 1 TABLET BY MOUTH ONCE DAILY AT BEDTIME 90 tablet 0    Qvar RediHaler 80 MCG/ACT inhaler INHALE 2 PUFFS BY MOUTH ONCE DAILY IN THE MORNING AND 2 PUFFS IN THE EVENING  RINSE MOUTH AFTER USE 11 g 3    rosuvastatin (CRESTOR) 5 mg tablet Take 1 tablet by mouth once daily 90 tablet 1    telmisartan-hydrochlorothiazide (MICARDIS HCT) 80-12 5 MG per tablet Take 1 tablet by mouth once daily 90 tablet 0     No current facility-administered medications for this visit         Objective:    /78 (BP Location: Left arm, " "Patient Position: Sitting, Cuff Size: Standard)   Pulse 68   Temp (!) 95 9 °F (35 5 °C)   Resp 18   Ht 4' 11\" (1 499 m)   Wt 59 1 kg (130 lb 3 2 oz)   BMI 26 30 kg/m²        Physical Exam  Skin:     Findings: Rash present  Rash is macular (< 1 cm on the scalp at tick bite juan manuel )                          Assessment/Plan:         Diagnoses and all orders for this visit:    Rash  Topical AB cream   Tick bite of scalp, initial encounter          rto prn                   Harry Larson MD      "

## 2023-05-03 DIAGNOSIS — I10 ESSENTIAL HYPERTENSION: ICD-10-CM

## 2023-05-04 RX ORDER — AMLODIPINE BESYLATE 5 MG/1
TABLET ORAL
Qty: 90 TABLET | Refills: 0 | Status: SHIPPED | OUTPATIENT
Start: 2023-05-04

## 2023-05-18 DIAGNOSIS — I10 ESSENTIAL HYPERTENSION: ICD-10-CM

## 2023-05-19 RX ORDER — METOPROLOL SUCCINATE 50 MG/1
TABLET, EXTENDED RELEASE ORAL
Qty: 90 TABLET | Refills: 0 | Status: SHIPPED | OUTPATIENT
Start: 2023-05-19

## 2023-06-16 ENCOUNTER — OFFICE VISIT (OUTPATIENT)
Dept: PODIATRY | Facility: CLINIC | Age: 88
End: 2023-06-16
Payer: MEDICARE

## 2023-06-16 VITALS
RESPIRATION RATE: 17 BRPM | SYSTOLIC BLOOD PRESSURE: 148 MMHG | DIASTOLIC BLOOD PRESSURE: 78 MMHG | HEIGHT: 59 IN | BODY MASS INDEX: 26.21 KG/M2 | WEIGHT: 130 LBS

## 2023-06-16 DIAGNOSIS — M20.12 HALLUX VALGUS OF LEFT FOOT: ICD-10-CM

## 2023-06-16 DIAGNOSIS — M54.16 RADICULOPATHY OF LUMBAR REGION: ICD-10-CM

## 2023-06-16 DIAGNOSIS — I70.209 PERIPHERAL ARTERIOSCLEROSIS (HCC): Primary | ICD-10-CM

## 2023-06-16 DIAGNOSIS — M25.572 ARTHRALGIA OF LEFT FOOT: ICD-10-CM

## 2023-06-16 DIAGNOSIS — B35.1 ONYCHOMYCOSIS: ICD-10-CM

## 2023-06-16 PROCEDURE — 99212 OFFICE O/P EST SF 10 MIN: CPT | Performed by: PODIATRIST

## 2023-06-16 PROCEDURE — 20605 DRAIN/INJ JOINT/BURSA W/O US: CPT | Performed by: PODIATRIST

## 2023-07-13 DIAGNOSIS — R10.13 EPIGASTRIC PAIN: ICD-10-CM

## 2023-07-13 RX ORDER — FAMOTIDINE 40 MG/1
TABLET, FILM COATED ORAL
Qty: 30 TABLET | Refills: 0 | OUTPATIENT
Start: 2023-07-13

## 2023-07-18 ENCOUNTER — OFFICE VISIT (OUTPATIENT)
Dept: PODIATRY | Facility: CLINIC | Age: 88
End: 2023-07-18
Payer: MEDICARE

## 2023-07-18 VITALS
HEIGHT: 59 IN | SYSTOLIC BLOOD PRESSURE: 148 MMHG | BODY MASS INDEX: 26.21 KG/M2 | WEIGHT: 130 LBS | RESPIRATION RATE: 17 BRPM | DIASTOLIC BLOOD PRESSURE: 78 MMHG

## 2023-07-18 DIAGNOSIS — M10.9 ACUTE GOUTY ARTHRITIS: ICD-10-CM

## 2023-07-18 DIAGNOSIS — I70.209 PERIPHERAL ARTERIOSCLEROSIS (HCC): Primary | ICD-10-CM

## 2023-07-18 DIAGNOSIS — M25.571 ARTHRALGIA OF RIGHT FOOT: ICD-10-CM

## 2023-07-18 DIAGNOSIS — M54.16 RADICULOPATHY OF LUMBAR REGION: ICD-10-CM

## 2023-07-18 PROCEDURE — 20605 DRAIN/INJ JOINT/BURSA W/O US: CPT | Performed by: PODIATRIST

## 2023-07-18 PROCEDURE — 99212 OFFICE O/P EST SF 10 MIN: CPT | Performed by: PODIATRIST

## 2023-07-18 RX ORDER — COLCHICINE 0.6 MG/1
0.6 TABLET ORAL DAILY
Qty: 3 TABLET | Refills: 0 | Status: SHIPPED | OUTPATIENT
Start: 2023-07-18 | End: 2023-07-21

## 2023-07-18 NOTE — PROGRESS NOTES
Assessment/Plan:  Metatarsalgia secondary to radiculopathy, right greater than left.  Pain upon ambulation.  Mycosis of nail.  Arthralgia.  Peripheral artery disease.      Plan.  Chart reviewed.  Patient advised on condition.   She will follow-up with pain management.  Today all nails debrided without pain or complication.  Return for follow-up as needed for injection.     Today 1 cc Kenalog 10 injected into left first MPJ without pain or complication. In addition, lab work ordered to rule out hyperuricemia. Colchicine ordered as well.        Diagnoses and all orders for this visit:     Metatarsalgia of both feet     Radiculopathy of lumbar region     Hallux valgus of right and left foot     Arthralgia of right and left foot, first MPJ     Onychomycosis     Peripheral artery disease.        Pain upon ambulation            Subjective:  Patient has pain in her feet.  She has pain with ambulation shoe wear.  Right greater than left.  She is aware she has back problems causing foot problems.  She is following up with pain management.  She has stopped taking Cymbalta.  She also gets pain in her toes ambulation and shoe wear.  No history of trauma.  She has been following with pain management               Allergies   Allergen Reactions   • Atorvastatin         Other reaction(s): Leg Cramps  Reaction Date: [id number];    • Azithromycin         Other reaction(s): Unknown Allergic Reaction  Reaction Date: [id number]; Annotation - [id number]: boonew   • Codeine Nausea Only       Reaction Date: [id number]; Annotation - [id number]: jjw   • Moxifloxacin         Other reaction(s): Diarrhea   • Penicillins Rash       Reaction Date: [id number];  Annotation - [id number]: ham            Current Outpatient Medications:   •  albuterol (PROAIR HFA) 90 mcg/act inhaler, Inhale 2 puffs every 6 (six) hours as needed for wheezing, Disp: 1 Inhaler, Rfl: 2  •  amLODIPine (NORVASC) 5 mg tablet, Take 1 tablet by mouth twice daily (Patient taking differently: 5 mg daily), Disp: 60 tablet, Rfl: 6  •  beclomethasone (Qvar RediHaler) 80 MCG/ACT inhaler, Inhale 2 puffs  in the morning and 2 puffs in the evening. Rinse mouth after use. ., Disp: 10.6 g, Rfl: 6  •  cycloSPORINE (RESTASIS) 0.05 % ophthalmic emulsion, Administer 1 drop to both eyes 2 (two) times a day, Disp: , Rfl:   •  DULoxetine (CYMBALTA) 30 mg delayed release capsule, Take 1 capsule (30 mg total) by mouth daily, Disp: 30 capsule, Rfl: 0  •  famotidine (PEPCID) 40 MG tablet, Take 1 tablet by mouth once daily, Disp: 30 tablet, Rfl: 0  •  levothyroxine 50 mcg tablet, Take 1 tablet by mouth once daily, Disp: 90 tablet, Rfl: 0  •  metoprolol succinate (TOPROL-XL) 50 mg 24 hr tablet, TAKE 1 TABLET BY MOUTH ONCE DAILY AT BEDTIME, Disp: 90 tablet, Rfl: 1  •  rosuvastatin (CRESTOR) 5 mg tablet, Take 1 tablet by mouth once daily, Disp: 30 tablet, Rfl: 6  •  telmisartan-hydrochlorothiazide (MICARDIS HCT) 80-12.5 MG per tablet, Take 1 tablet by mouth once daily, Disp: 90 tablet, Rfl: 0           Patient Active Problem List   Diagnosis   • Asthma   • Benign essential hypertension   • Chronic kidney disease, stage II (mild)   • Mixed hyperlipidemia   • Osteoporosis   • Other specified hypothyroidism   • Abnormal CT of the abdomen   • Epigastric pain   • Abdominal bloating   • Delayed emergence from anesthesia   • Back pain   • Lipoma of back   • Heart murmur             Patient ID: Nicki Escobedo is a 80 y. o. female.     HPI     The following portions of the patient's history were reviewed and updated as appropriate:      family history includes Aneurysm in her sister; Breast cancer (age of onset: 80) in her sister; Cancer in her daughter; Heart disease in her brother, brother, brother, daughter, father, and sister; Kidney disease in her sister; No Known Problems in her maternal aunt, maternal aunt, maternal aunt, paternal aunt, and paternal aunt.       reports that she has never smoked.  She has never used smokeless tobacco. She reports previous alcohol use. She reports that she does not use drugs.        Objective:  Patient's shoes and socks removed.   Foot ExamPhysical Exam       Patient's shoes and socks removed.   Foot ExamPhysical Exam          General  General Appearance: appears stated age and healthy   Orientation: alert and oriented to person, place, and time   Affect: appropriate   Gait: antalgic         Right Foot/Ankle      Inspection and Palpation  Ecchymosis: none  Tenderness: bony tenderness .  Pain patient has inflamed bunion.  No evidence of a infection or gout  Swelling: dorsum   Arch: pes cavus  Claw Toes: second toe  Hallux valgus: no  Hallux limitus: yes  Skin Exam: callus and dry skin;      Neurovascular  Dorsalis pedis: 3+  Posterior tibial: 3+  Saphenous nerve sensation: normal  Tibial nerve sensation: normal  Superficial peroneal nerve sensation: normal  Deep peroneal nerve sensation: normal  Sural nerve sensation: normal        Left Foot/Ankle       Inspection and Palpation  Ecchymosis: none  Swelling: dorsum   Arch: pes planus  Hallux valgus: no  Hallux limitus: yes  Skin Exam: callus and dry skin;      Neurovascular  Dorsalis pedis: 3+  Posterior tibial: 3+  Saphenous nerve sensation: normal  Tibial nerve sensation: normal  Superficial peroneal nerve sensation: normal  Deep peroneal nerve sensation: normal  Sural nerve sensation: normal           Physical Exam  Vitals signs and nursing note reviewed. Constitutional:       Appearance: Normal appearance. Cardiovascular:      Pulses:           Dorsalis pedis pulses are 3+ on the right side and 3+ on the left side.        Posterior tibial pulses are 3+ on the right side and 3+ on the left side. Musculoskeletal:      Right foot: Bony tenderness present.  No bunion.      Left foot: No bunion.      Comments: X-ray of right foot demonstrates no evidence of fracture osteomyelitis.  However middle phalanx of 2nd right toe demonstrates cystic change consistent with interosseous tophaceous change.   Feet:      Right foot:      Skin integrity: Callus and dry skin present.      Left foot:      Skin integrity: Callus and dry skin present.    Skin:     General: Skin is warm.      Patient has 4/5 L5 testing.

## 2023-07-19 ENCOUNTER — APPOINTMENT (OUTPATIENT)
Dept: LAB | Facility: CLINIC | Age: 88
End: 2023-07-19
Payer: MEDICARE

## 2023-07-19 DIAGNOSIS — M10.9 ACUTE GOUTY ARTHRITIS: ICD-10-CM

## 2023-07-19 LAB — URATE SERPL-MCNC: 7.6 MG/DL (ref 2–7.5)

## 2023-07-19 PROCEDURE — 36415 COLL VENOUS BLD VENIPUNCTURE: CPT

## 2023-07-19 PROCEDURE — 84550 ASSAY OF BLOOD/URIC ACID: CPT

## 2023-07-21 ENCOUNTER — OFFICE VISIT (OUTPATIENT)
Dept: FAMILY MEDICINE CLINIC | Facility: CLINIC | Age: 88
End: 2023-07-21
Payer: MEDICARE

## 2023-07-21 VITALS
HEIGHT: 59 IN | WEIGHT: 126.8 LBS | HEART RATE: 60 BPM | BODY MASS INDEX: 25.56 KG/M2 | TEMPERATURE: 98.2 F | DIASTOLIC BLOOD PRESSURE: 75 MMHG | SYSTOLIC BLOOD PRESSURE: 160 MMHG | RESPIRATION RATE: 18 BRPM

## 2023-07-21 DIAGNOSIS — M10.9 ACUTE GOUT, UNSPECIFIED CAUSE, UNSPECIFIED SITE: ICD-10-CM

## 2023-07-21 DIAGNOSIS — I10 BENIGN ESSENTIAL HYPERTENSION: Primary | ICD-10-CM

## 2023-07-21 PROCEDURE — 99214 OFFICE O/P EST MOD 30 MIN: CPT | Performed by: FAMILY MEDICINE

## 2023-07-21 RX ORDER — ALLOPURINOL 100 MG/1
200 TABLET ORAL DAILY
Qty: 180 TABLET | Refills: 3 | Status: SHIPPED | OUTPATIENT
Start: 2023-07-21

## 2023-07-21 RX ORDER — TELMISARTAN 80 MG/1
80 TABLET ORAL DAILY
Qty: 90 TABLET | Refills: 1 | Status: SHIPPED | OUTPATIENT
Start: 2023-07-21

## 2023-07-21 RX ORDER — AMLODIPINE BESYLATE 10 MG/1
10 TABLET ORAL DAILY
Qty: 90 TABLET | Refills: 1 | Status: SHIPPED | OUTPATIENT
Start: 2023-07-21

## 2023-07-21 NOTE — PROGRESS NOTES
Chief Complaint   Patient presents with   • Follow-up     F/u Dr. Laverne Wong appointment. Patient has questiong about what may be causing gout   HTN, Gout      Patient ID: Desi Chino is a 80 y.o. female. HPI  Pt is seeing for f/u Gout and HTN -  On Allopurinol, Uric Acid level is 7.6 - recent flare with R foot pain-  Went to see Podiatrist -  Improving slowly with steroid injection -  On HCTZ for HTN - it was suggested by podiatrist to stop HCTZ      The following portions of the patient's history were reviewed and updated as appropriate: allergies, current medications, past family history, past medical history, past social history, past surgical history and problem list.    Review of Systems   Constitutional: Negative. Respiratory: Negative. Cardiovascular: Negative. Gastrointestinal: Negative. Genitourinary: Negative. Musculoskeletal: Positive for arthralgias (R foot -  improving ). Skin: Negative. Neurological: Negative.         Current Outpatient Medications   Medication Sig Dispense Refill   • albuterol (ProAir HFA) 90 mcg/act inhaler Inhale 2 puffs every 6 (six) hours as needed for wheezing 18 g 1   • allopurinol (ZYLOPRIM) 100 mg tablet Take 1 tablet (100 mg total) by mouth daily 90 tablet 3   • amLODIPine (NORVASC) 5 mg tablet Take 1 tablet by mouth once daily 90 tablet 0   • cycloSPORINE (RESTASIS) 0.05 % ophthalmic emulsion Administer 1 drop to both eyes 2 (two) times a day     • famotidine (PEPCID) 40 MG tablet Take 1 tablet by mouth once daily 30 tablet 0   • fluticasone (FLONASE) 50 mcg/act nasal spray USE 1 TO 2 SPRAY(S) IN EACH NOSTRIL TWICE DAILY FOR 30 DAYS     • levothyroxine 50 mcg tablet Take 1 tablet by mouth once daily 90 tablet 1   • metoprolol succinate (TOPROL-XL) 50 mg 24 hr tablet TAKE 1 TABLET BY MOUTH ONCE DAILY AT BEDTIME 90 tablet 0   • rosuvastatin (CRESTOR) 5 mg tablet Take 1 tablet by mouth once daily 90 tablet 1   • telmisartan-hydrochlorothiazide (MICARDIS HCT) 80-12.5 MG per tablet Take 1 tablet by mouth once daily 90 tablet 0   • colchicine (COLCRYS) 0.6 mg tablet Take 1 tablet (0.6 mg total) by mouth daily for 3 days (Patient not taking: Reported on 7/21/2023) 3 tablet 0   • Qvar RediHaler 80 MCG/ACT inhaler INHALE 2 PUFFS BY MOUTH ONCE DAILY IN THE MORNING AND 2 PUFFS IN THE EVENING. RINSE MOUTH AFTER USE (Patient not taking: Reported on 7/21/2023) 11 g 3     No current facility-administered medications for this visit. Objective:    /75 Comment: by MD  Pulse 60   Temp 98.2 °F (36.8 °C)   Resp 18   Ht 4' 11" (1.499 m)   Wt 57.5 kg (126 lb 12.8 oz)   BMI 25.61 kg/m²        Physical Exam  Constitutional:       General: She is not in acute distress. Appearance: She is not ill-appearing. Cardiovascular:      Rate and Rhythm: Normal rate and regular rhythm. Pulmonary:      Effort: Pulmonary effort is normal. No respiratory distress. Breath sounds: No wheezing, rhonchi or rales. Musculoskeletal:         General: Swelling and tenderness present. Right lower leg: No edema. Left lower leg: No edema. Legs:    Neurological:      Mental Status: She is alert and oriented to person, place, and time. Psychiatric:         Mood and Affect: Mood normal.           Labs in chart were reviewed. Assessment/Plan:         Diagnoses and all orders for this visit:    Benign essential hypertension  -   Will changed Micardis HCT to   telmisartan (MICARDIS) 80 MG tablet; Take 1 tablet (80 mg total) by mouth daily  -     Will increase med dose from 5 mg to amLODIPine (NORVASC) 10 mg tablet; Take 1 tablet (10 mg total) by mouth daily    Phone f/u with BP log in 2 wks       Acute gout, unspecified cause, unspecified site  -   Will increase med from 10 mg to   allopurinol (ZYLOPRIM) 100 mg tablet;  Take 2 tablets (200 mg total) by mouth daily        rto in 2 m for AWV and HTN                    Agata Johnson MD

## 2023-08-07 ENCOUNTER — TELEPHONE (OUTPATIENT)
Dept: FAMILY MEDICINE CLINIC | Facility: CLINIC | Age: 88
End: 2023-08-07

## 2023-08-07 DIAGNOSIS — I10 BENIGN ESSENTIAL HYPERTENSION: Primary | ICD-10-CM

## 2023-08-07 RX ORDER — SPIRONOLACTONE 25 MG/1
25 TABLET ORAL EVERY OTHER DAY
Qty: 45 TABLET | Refills: 0 | Status: SHIPPED | OUTPATIENT
Start: 2023-08-07

## 2023-08-07 NOTE — TELEPHONE ENCOUNTER
Pl, advise pt -  BP log reviewed -  I sent new med ( Spironolactone ) to be taken every other day  -  BMP is needed in 2 wks and f/u jean pierre in 2-3 wks

## 2023-08-13 DIAGNOSIS — I10 ESSENTIAL HYPERTENSION: ICD-10-CM

## 2023-08-14 RX ORDER — METOPROLOL SUCCINATE 50 MG/1
TABLET, EXTENDED RELEASE ORAL
Qty: 90 TABLET | Refills: 0 | Status: SHIPPED | OUTPATIENT
Start: 2023-08-14

## 2023-08-17 DIAGNOSIS — J45.40 MODERATE PERSISTENT ASTHMA WITHOUT COMPLICATION: ICD-10-CM

## 2023-08-17 RX ORDER — BECLOMETHASONE DIPROPIONATE HFA 80 UG/1
AEROSOL, METERED RESPIRATORY (INHALATION)
Qty: 11 G | Refills: 0 | Status: SHIPPED | OUTPATIENT
Start: 2023-08-17

## 2023-08-21 ENCOUNTER — EVALUATION (OUTPATIENT)
Dept: PHYSICAL THERAPY | Facility: CLINIC | Age: 88
End: 2023-08-21
Payer: MEDICARE

## 2023-08-21 DIAGNOSIS — M12.812 ROTATOR CUFF TEAR ARTHROPATHY, LEFT: Primary | ICD-10-CM

## 2023-08-21 DIAGNOSIS — M75.102 ROTATOR CUFF TEAR ARTHROPATHY, LEFT: Primary | ICD-10-CM

## 2023-08-21 PROCEDURE — 97161 PT EVAL LOW COMPLEX 20 MIN: CPT | Performed by: PHYSICAL THERAPIST

## 2023-08-21 NOTE — PROGRESS NOTES
PT Evaluation     Today's date: 2023  Patient name: Glenis Vila  : 1931  MRN: [id number]  Referring provider: Trang Garnica MD  Dx:   Encounter Diagnosis     ICD-10-CM    1. Rotator cuff tear arthropathy, left  M75.102     M12.812                      Assessment  Assessment details: Mallorie Escobedopresents with signs and symptoms consistent with Rotator cuff tear arthropathy, left  (primary encounter diagnosis), with loss of range of motion, strength and spinal stabilization. Presents with high reactivity. Glenis Vila would benefit with physical therapy to address these impairments to return to prior level of function. Impairments: abnormal or restricted ROM, activity intolerance, impaired physical strength, lacks appropriate home exercise program and pain with function  Understanding of Dx/Px/POC: good   Prognosis: good    Goals  STG  Initiate HEP  Decrease pain by 50% in 3 weeks  Patient performing HEP 50% of time in 3 weeks  LTG  Independent with HEP  Decrease pain by 90% in 6 weeks  Patient performing HEP 90% of time in 6 weeks  FOTO > 61    in 6 weeks    Plan  Planned therapy interventions: manual therapy, neuromuscular re-education, patient education, strengthening, stretching, therapeutic exercise and home exercise program  Frequency: 2x week  Duration in visits: 12  Duration in weeks: 6  Treatment plan discussed with: patient        Subjective Evaluation    History of Present Illness  Mechanism of injury: I developed left shoulder pain after using a weed david 1 month ago. I have a rotator cuff tear, but am not a surgical candidate. I want to increase my strength and less pain.           Recurrent probem    Quality of life: good    Patient Goals  Patient goals for therapy: decreased pain, increased motion, increased strength, independence with ADLs/IADLs and return to sport/leisure activities    Pain  Current pain rating: 3  At best pain ratin  At worst pain rating: 4  Location: left shoulder  Quality: dull ache and needle-like  Relieving factors: rest  Aggravating factors: lifting and overhead activity    Treatments  Current treatment: physical therapy        Objective     Active Range of Motion   Left Shoulder   Flexion: 160 degrees with pain  Abduction: 150 degrees with pain  External rotation 0°: 70 degrees with pain    Right Shoulder   Flexion: 170 degrees   Abduction: 170 degrees   External rotation 0°: 80 degrees     Strength/Myotome Testing     Left Shoulder     Planes of Motion   Flexion: 3   Abduction: 3   External rotation at 0°: 3-     Right Shoulder     Planes of Motion   Flexion: 5   Abduction: 5   External rotation at 0°: 5              Precautions: Medical History    Diagnosis Date Comment Source   Anesthesia  groggy, difficult to awaken    Asthma      Chronic kidney disease  stage 2 mild    Chronic pain disorder  lumbar herniated discs    Dry eyes      Hyperlipidemia      Hypertension            Manuals                                                                 Neuro Re-Ed                                                                                                        Ther Ex                                                                                                                     Ther Activity                                       Gait Training                                       Modalities

## 2023-08-24 ENCOUNTER — OFFICE VISIT (OUTPATIENT)
Dept: PHYSICAL THERAPY | Facility: CLINIC | Age: 88
End: 2023-08-24
Payer: MEDICARE

## 2023-08-24 DIAGNOSIS — M12.812 ROTATOR CUFF TEAR ARTHROPATHY, LEFT: Primary | ICD-10-CM

## 2023-08-24 DIAGNOSIS — M75.102 ROTATOR CUFF TEAR ARTHROPATHY, LEFT: Primary | ICD-10-CM

## 2023-08-24 PROCEDURE — 97110 THERAPEUTIC EXERCISES: CPT

## 2023-08-24 PROCEDURE — 97140 MANUAL THERAPY 1/> REGIONS: CPT

## 2023-08-24 NOTE — PROGRESS NOTES
Daily Note     Today's date: 2023  Patient name: David Honeycutt  : 1931  MRN: [id number]  Referring provider: Sofia Garcia MD  Dx:   Encounter Diagnosis     ICD-10-CM    1. Rotator cuff tear arthropathy, left  M75.102     M12.812           Start Time: 1143  Stop Time: 1230  Total time in clinic (min): 47 minutes    Subjective: I did my HEP on Tuesday & then yesterday I couldn't move my arm. Objective: See treatment diary below      Assessment: Tolerated treatment fair. Patient demonstrated fatigue post treatment, exhibited good technique with therapeutic exercises and would benefit from continued PT      Plan: Progress treatment as tolerated.        Precautions: Medical History    Diagnosis Date Comment Source   Anesthesia  groggy, difficult to awaken    Asthma      Chronic kidney disease  stage 2 mild    Chronic pain disorder  lumbar herniated discs    Dry eyes      Hyperlipidemia      Hypertension            Manuals   EVAL              L scap PNF in R S/L                                                  Neuro Re-Ed                                                                                                        Ther Ex             FIS w/ blue PB  10 x 5 sec hold            Pulleys  10x 5 sec hold            Seated no money  No resistance 10 x 5 sec hold            TB rows, ext, IR,ER  YTB x 20 reps            scap retractions   Supine 10 x 5 sec hold            L shoulder ER in S/L  2x10 reps                                      Ther Activity                                       Gait Training                                       Modalities             CP L shoulder   Supine x 8 min

## 2023-08-25 ENCOUNTER — APPOINTMENT (OUTPATIENT)
Dept: LAB | Facility: CLINIC | Age: 88
End: 2023-08-25
Payer: MEDICARE

## 2023-08-25 ENCOUNTER — OFFICE VISIT (OUTPATIENT)
Dept: PODIATRY | Facility: CLINIC | Age: 88
End: 2023-08-25
Payer: MEDICARE

## 2023-08-25 VITALS — RESPIRATION RATE: 17 BRPM | WEIGHT: 126 LBS | HEIGHT: 59 IN | BODY MASS INDEX: 25.4 KG/M2

## 2023-08-25 DIAGNOSIS — M54.16 RADICULOPATHY OF LUMBAR REGION: ICD-10-CM

## 2023-08-25 DIAGNOSIS — I10 BENIGN ESSENTIAL HYPERTENSION: ICD-10-CM

## 2023-08-25 DIAGNOSIS — M25.571 ARTHRALGIA OF RIGHT FOOT: ICD-10-CM

## 2023-08-25 DIAGNOSIS — B35.1 ONYCHOMYCOSIS: ICD-10-CM

## 2023-08-25 DIAGNOSIS — I70.209 PERIPHERAL ARTERIOSCLEROSIS (HCC): Primary | ICD-10-CM

## 2023-08-25 DIAGNOSIS — M25.572 ARTHRALGIA OF LEFT FOOT: ICD-10-CM

## 2023-08-25 LAB
ANION GAP SERPL CALCULATED.3IONS-SCNC: 9 MMOL/L
BUN SERPL-MCNC: 20 MG/DL (ref 5–25)
CALCIUM SERPL-MCNC: 9.5 MG/DL (ref 8.4–10.2)
CHLORIDE SERPL-SCNC: 109 MMOL/L (ref 96–108)
CO2 SERPL-SCNC: 22 MMOL/L (ref 21–32)
CREAT SERPL-MCNC: 1.02 MG/DL (ref 0.6–1.3)
GFR SERPL CREATININE-BSD FRML MDRD: 47 ML/MIN/1.73SQ M
GLUCOSE P FAST SERPL-MCNC: 108 MG/DL (ref 65–99)
POTASSIUM SERPL-SCNC: 4.2 MMOL/L (ref 3.5–5.3)
SODIUM SERPL-SCNC: 140 MMOL/L (ref 135–147)

## 2023-08-25 PROCEDURE — 80048 BASIC METABOLIC PNL TOTAL CA: CPT

## 2023-08-25 PROCEDURE — 99212 OFFICE O/P EST SF 10 MIN: CPT | Performed by: PODIATRIST

## 2023-08-25 PROCEDURE — 36415 COLL VENOUS BLD VENIPUNCTURE: CPT

## 2023-08-25 NOTE — PROGRESS NOTES
Assessment/Plan:  Metatarsalgia secondary to radiculopathy, right greater than left.  Pain upon ambulation.  Mycosis of nail.  Arthralgia.  Peripheral artery disease.      Plan.  Chart reviewed.  Patient advised on condition.   She will follow-up with pain management.  Today all nails debrided without pain or complication.  Return for follow-up as needed for injection.            Diagnoses and all orders for this visit:     Metatarsalgia of both feet     Radiculopathy of lumbar region     Hallux valgus of right and left foot     Arthralgia of right and left foot, first MPJ     Onychomycosis     Peripheral artery disease.        Pain upon ambulation            Subjective:  Patient has pain in her feet.  She has pain with ambulation shoe wear.  Right greater than left.  She is aware she has back problems causing foot problems.  She is following up with pain management.  She has stopped taking Cymbalta.  She also gets pain in her toes ambulation and shoe wear.  No history of trauma.  She has been following with pain management               Allergies   Allergen Reactions   • Atorvastatin         Other reaction(s): Leg Cramps  Reaction Date: [id number];    • Azithromycin         Other reaction(s): Unknown Allergic Reaction  Reaction Date: [id number]; Annotation - [id number]: jdeisyw   • Codeine Nausea Only       Reaction Date: [id number]; Annotation - [id number]: jdeisyw   • Moxifloxacin         Other reaction(s): Diarrhea   • Penicillins Rash       Reaction Date: [id number]; Annotation - [id number]: boonew            Current Outpatient Medications:   •  albuterol (PROAIR HFA) 90 mcg/act inhaler, Inhale 2 puffs every 6 (six) hours as needed for wheezing, Disp: 1 Inhaler, Rfl: 2  •  amLODIPine (NORVASC) 5 mg tablet, Take 1 tablet by mouth twice daily (Patient taking differently: 5 mg daily), Disp: 60 tablet, Rfl: 6  •  beclomethasone (Qvar RediHaler) 80 MCG/ACT inhaler, Inhale 2 puffs  in the morning and 2 puffs in the evening.  Rinse mouth after use. ., Disp: 10.6 g, Rfl: 6  •  cycloSPORINE (RESTASIS) 0.05 % ophthalmic emulsion, Administer 1 drop to both eyes 2 (two) times a day, Disp: , Rfl:   •  DULoxetine (CYMBALTA) 30 mg delayed release capsule, Take 1 capsule (30 mg total) by mouth daily, Disp: 30 capsule, Rfl: 0  •  famotidine (PEPCID) 40 MG tablet, Take 1 tablet by mouth once daily, Disp: 30 tablet, Rfl: 0  •  levothyroxine 50 mcg tablet, Take 1 tablet by mouth once daily, Disp: 90 tablet, Rfl: 0  •  metoprolol succinate (TOPROL-XL) 50 mg 24 hr tablet, TAKE 1 TABLET BY MOUTH ONCE DAILY AT BEDTIME, Disp: 90 tablet, Rfl: 1  •  rosuvastatin (CRESTOR) 5 mg tablet, Take 1 tablet by mouth once daily, Disp: 30 tablet, Rfl: 6  •  telmisartan-hydrochlorothiazide (MICARDIS HCT) 80-12.5 MG per tablet, Take 1 tablet by mouth once daily, Disp: 90 tablet, Rfl: 0           Patient Active Problem List   Diagnosis   • Asthma   • Benign essential hypertension   • Chronic kidney disease, stage II (mild)   • Mixed hyperlipidemia   • Osteoporosis   • Other specified hypothyroidism   • Abnormal CT of the abdomen   • Epigastric pain   • Abdominal bloating   • Delayed emergence from anesthesia   • Back pain   • Lipoma of back   • Heart murmur             Patient ID: Nicki Escobedo is a 80 y. o. female.     HPI     The following portions of the patient's history were reviewed and updated as appropriate:      family history includes Aneurysm in her sister; Breast cancer (age of onset: 80) in her sister; Cancer in her daughter; Heart disease in her brother, brother, brother, daughter, father, and sister; Kidney disease in her sister; No Known Problems in her maternal aunt, maternal aunt, maternal aunt, paternal aunt, and paternal aunt.       reports that she has never smoked. She has never used smokeless tobacco. She reports previous alcohol use.  She reports that she does not use drugs.        Objective:  Patient's shoes and socks removed.   Foot ExamPhysical Exam          General  General Appearance: appears stated age and healthy   Orientation: alert and oriented to person, place, and time   Affect: appropriate   Gait: antalgic         Right Foot/Ankle      Inspection and Palpation  Ecchymosis: none  Tenderness: bony tenderness .  Pain patient has inflamed bunion.  No evidence of a infection or gout  Swelling: dorsum   Arch: pes cavus  Claw Toes: second toe  Hallux valgus: no  Hallux limitus: yes  Skin Exam: callus and dry skin;      Neurovascular  Dorsalis pedis: 3+  Posterior tibial: 3+  Saphenous nerve sensation: normal  Tibial nerve sensation: normal  Superficial peroneal nerve sensation: normal  Deep peroneal nerve sensation: normal  Sural nerve sensation: normal        Left Foot/Ankle       Inspection and Palpation  Ecchymosis: none  Swelling: dorsum   Arch: pes planus  Hallux valgus: no  Hallux limitus: yes  Skin Exam: callus and dry skin;      Neurovascular  Dorsalis pedis: 3+  Posterior tibial: 3+  Saphenous nerve sensation: normal  Tibial nerve sensation: normal  Superficial peroneal nerve sensation: normal  Deep peroneal nerve sensation: normal  Sural nerve sensation: normal           Physical Exam  Vitals signs and nursing note reviewed. Constitutional:       Appearance: Normal appearance. Cardiovascular:      Pulses:           Dorsalis pedis pulses are 3+ on the right side and 3+ on the left side.        Posterior tibial pulses are 3+ on the right side and 3+ on the left side. Musculoskeletal:      Right foot: Bony tenderness present. No bunion.      Left foot: No bunion.      Comments: X-ray of right foot demonstrates no evidence of fracture osteomyelitis.  However middle phalanx of 2nd right toe demonstrates cystic change consistent with interosseous tophaceous change.   Feet:      Right foot:      Skin integrity: Callus and dry skin present.      Left foot:      Skin integrity: Callus and dry skin present.    Skin:     General: Skin is warm.      Patient has 4/5 L5 testing.

## 2023-08-28 ENCOUNTER — OFFICE VISIT (OUTPATIENT)
Dept: PHYSICAL THERAPY | Facility: CLINIC | Age: 88
End: 2023-08-28
Payer: MEDICARE

## 2023-08-28 DIAGNOSIS — M12.812 ROTATOR CUFF TEAR ARTHROPATHY, LEFT: Primary | ICD-10-CM

## 2023-08-28 DIAGNOSIS — M75.102 ROTATOR CUFF TEAR ARTHROPATHY, LEFT: Primary | ICD-10-CM

## 2023-08-28 PROCEDURE — 97110 THERAPEUTIC EXERCISES: CPT | Performed by: PHYSICAL THERAPIST

## 2023-08-28 PROCEDURE — 97112 NEUROMUSCULAR REEDUCATION: CPT | Performed by: PHYSICAL THERAPIST

## 2023-08-28 NOTE — PROGRESS NOTES
Daily Note     Today's date: 2023  Patient name: Sabine Emerson  : 1931  MRN: [id number]  Referring provider: Rudy Carvalho MD  Dx:   Encounter Diagnosis     ICD-10-CM    1. Rotator cuff tear arthropathy, left  M75.102     M12.812                      Subjective: Used  once      Objective: See treatment diary below      Assessment: Tolerated treatment well. Patient demonstrated fatigue post treatment and exhibited good technique with therapeutic exercises      Plan: Continue per plan of care.       Precautions: Medical History    Diagnosis Date Comment Source   Anesthesia  groggy, difficult to awaken    Asthma      Chronic kidney disease  stage 2 mild    Chronic pain disorder  lumbar herniated discs    Dry eyes      Hyperlipidemia      Hypertension            Manuals   EVAL              L scap PNF in R S/L                                                  Neuro Re-Ed                                                                                                        Ther Ex             FIS w/ blue PB  10 x 5 sec hold  20x          Pulleys  10x 5 sec hold  20x          Seated no money  No resistance 10 x 5 sec hold  20x          TB rows, ext, IR,ER  YTB x 20 reps  20x          scap retractions   Supine 10 x 5 sec hold  20x          L shoulder ER in S/L  2x10 reps  20x                                    Ther Activity                                       Gait Training                                       Modalities             CP L shoulder   Supine x 8 min

## 2023-08-31 ENCOUNTER — OFFICE VISIT (OUTPATIENT)
Dept: PHYSICAL THERAPY | Facility: CLINIC | Age: 88
End: 2023-08-31
Payer: MEDICARE

## 2023-08-31 DIAGNOSIS — M75.102 ROTATOR CUFF TEAR ARTHROPATHY, LEFT: Primary | ICD-10-CM

## 2023-08-31 DIAGNOSIS — M12.812 ROTATOR CUFF TEAR ARTHROPATHY, LEFT: Primary | ICD-10-CM

## 2023-08-31 PROCEDURE — 97110 THERAPEUTIC EXERCISES: CPT

## 2023-08-31 PROCEDURE — 97140 MANUAL THERAPY 1/> REGIONS: CPT

## 2023-08-31 NOTE — PROGRESS NOTES
Daily Note     Today's date: 2023  Patient name: Alisa Watts  : 1931  MRN: [id number]  Referring provider: Christina Briceño MD  Dx:   Encounter Diagnosis     ICD-10-CM    1. Rotator cuff tear arthropathy, left  M75.102     M12.812           Start Time: 1530          Subjective: My L arm is feeling better, but still weak. Objective: See treatment diary below      Assessment: Tolerated treatment fair. Patient demonstrated fatigue post treatment, exhibited good technique with therapeutic exercises and would benefit from continued PT      Plan: Progress treatment as tolerated.        Precautions: Medical History    Diagnosis Date Comment Source   Anesthesia  groggy, difficult to awaken    Asthma      Chronic kidney disease  stage 2 mild    Chronic pain disorder  lumbar herniated discs    Dry eyes      Hyperlipidemia      Hypertension            Manuals   EVAL            L scap PNF in R S/L  L scap PNF in R S/L                                                Neuro Re-Ed                                                                                                        Ther Ex             FIS w/ blue PB  10 x 5 sec hold  20x 20 x 5 sec hold          Pulleys  10x 5 sec hold  20x 20x 5 sec hold          Seated no money  No resistance 10 x 5 sec hold  20x No resistance 20 x 5 sec hold          TB rows, ext, IR,ER  YTB x 20 reps  20x RTB x 20 reps          scap retractions   Supine 10 x 5 sec hold  20x Supine 10 x 5 sec hold         L shoulder ER in S/L  2x10 reps  20x 2x10 reps              AAROM shoulder flexion 10x supine                      Ther Activity                                       Gait Training                                       Modalities             CP L shoulder   Supine x 8 min   R S/L x 8 minutes

## 2023-09-05 ENCOUNTER — OFFICE VISIT (OUTPATIENT)
Dept: PHYSICAL THERAPY | Facility: CLINIC | Age: 88
End: 2023-09-05
Payer: MEDICARE

## 2023-09-05 DIAGNOSIS — M75.102 ROTATOR CUFF TEAR ARTHROPATHY, LEFT: Primary | ICD-10-CM

## 2023-09-05 DIAGNOSIS — M12.812 ROTATOR CUFF TEAR ARTHROPATHY, LEFT: Primary | ICD-10-CM

## 2023-09-05 PROCEDURE — 97112 NEUROMUSCULAR REEDUCATION: CPT | Performed by: PHYSICAL THERAPIST

## 2023-09-05 PROCEDURE — 97110 THERAPEUTIC EXERCISES: CPT | Performed by: PHYSICAL THERAPIST

## 2023-09-05 NOTE — PROGRESS NOTES
Daily Note     Today's date: 2023  Patient name: Alejandra Dye  : 1931  MRN: [id number]  Referring provider: Марина Prakash MD  Dx:   Encounter Diagnosis     ICD-10-CM    1. Rotator cuff tear arthropathy, left  M75.102     M12.812                      Subjective: My arm feels stronger      Objective: See treatment diary below      Assessment: Tolerated treatment well. Patient demonstrated fatigue post treatment and exhibited good technique with therapeutic exercises      Plan: Continue per plan of care.       Precautions: Medical History    Diagnosis Date Comment Source   Anesthesia  groggy, difficult to awaken    Asthma      Chronic kidney disease  stage 2 mild    Chronic pain disorder  lumbar herniated discs    Dry eyes      Hyperlipidemia      Hypertension            Manuals   EVAL           L scap PNF in R S/L  L scap PNF in R S/L                                                Neuro Re-Ed                                                                                                        Ther Ex             FIS w/ blue PB  10 x 5 sec hold  20x 20 x 5 sec hold  20x        Pulleys  10x 5 sec hold  20x 20x 5 sec hold  20x        Seated no money  No resistance 10 x 5 sec hold  20x No resistance 20 x 5 sec hold  20x        TB rows, ext, IR,ER  YTB x 20 reps  20x RTB x 20 reps  RTB 4x20        scap retractions   Supine 10 x 5 sec hold  20x Supine 10 x 5 sec hold 20x        L shoulder ER in S/L  2x10 reps  20x 2x10 reps  20x            AAROM shoulder flexion 10x supine                      Ther Activity                                       Gait Training                                       Modalities             CP L shoulder   Supine x 8 min   R S/L x 8 minutes

## 2023-09-07 ENCOUNTER — OFFICE VISIT (OUTPATIENT)
Dept: PHYSICAL THERAPY | Facility: CLINIC | Age: 88
End: 2023-09-07
Payer: MEDICARE

## 2023-09-07 DIAGNOSIS — M75.102 ROTATOR CUFF TEAR ARTHROPATHY, LEFT: Primary | ICD-10-CM

## 2023-09-07 DIAGNOSIS — M12.812 ROTATOR CUFF TEAR ARTHROPATHY, LEFT: Primary | ICD-10-CM

## 2023-09-07 PROCEDURE — 97110 THERAPEUTIC EXERCISES: CPT | Performed by: PHYSICAL THERAPIST

## 2023-09-07 PROCEDURE — 97112 NEUROMUSCULAR REEDUCATION: CPT | Performed by: PHYSICAL THERAPIST

## 2023-09-07 NOTE — PROGRESS NOTES
Daily Note     Today's date: 2023  Patient name: Katarzyna Manzo  : 1931  MRN: [id number]  Referring provider: Easton Velázquez MD  Dx:   Encounter Diagnosis     ICD-10-CM    1. Rotator cuff tear arthropathy, left  M75.102     M12.812                      Subjective: I am sore in both shoulders  Objective: See treatment diary below      Assessment: Tolerated treatment well. Patient demonstrated fatigue post treatment and exhibited good technique with therapeutic exercises      Plan: Continue per plan of care.       Precautions: Medical History    Diagnosis Date Comment Source   Anesthesia  groggy, difficult to awaken    Asthma      Chronic kidney disease  stage 2 mild    Chronic pain disorder  lumbar herniated discs    Dry eyes      Hyperlipidemia      Hypertension            Manuals   EVAL          L scap PNF in R S/L  L scap PNF in R S/L                                                Neuro Re-Ed                                                                                                        Ther Ex             FIS w/ blue PB  10 x 5 sec hold  20x 20 x 5 sec hold  20x 20x       Pulleys  10x 5 sec hold  20x 20x 5 sec hold  20x 20x       Seated no money  No resistance 10 x 5 sec hold  20x No resistance 20 x 5 sec hold  20x 20x       TB rows, ext, IR,ER  YTB x 20 reps  20x RTB x 20 reps  RTB 4x20 YTB 4x20       scap retractions   Supine 10 x 5 sec hold  20x Supine 10 x 5 sec hold 20x 20x       L shoulder ER in S/L  2x10 reps  20x 2x10 reps  20x 20x           AAROM shoulder flexion 10x supine  20x                    Ther Activity                                       Gait Training                                       Modalities             CP L shoulder   Supine x 8 min   R S/L x 8 minutes

## 2023-09-11 ENCOUNTER — OFFICE VISIT (OUTPATIENT)
Dept: PHYSICAL THERAPY | Facility: CLINIC | Age: 88
End: 2023-09-11
Payer: MEDICARE

## 2023-09-11 DIAGNOSIS — M12.812 ROTATOR CUFF TEAR ARTHROPATHY, LEFT: Primary | ICD-10-CM

## 2023-09-11 DIAGNOSIS — M75.102 ROTATOR CUFF TEAR ARTHROPATHY, LEFT: Primary | ICD-10-CM

## 2023-09-11 PROCEDURE — 97112 NEUROMUSCULAR REEDUCATION: CPT

## 2023-09-11 PROCEDURE — 97110 THERAPEUTIC EXERCISES: CPT

## 2023-09-11 NOTE — PROGRESS NOTES
Daily Note     Today's date: 2023  Patient name: Juan Daniel Sanches  : 1931  MRN: [id number]  Referring provider: Gian Ventura MD  Dx:   Encounter Diagnosis     ICD-10-CM    1. Rotator cuff tear arthropathy, left  M75.102     M12.812           Start Time: 1015          Subjective: My shoulders are very sore from vacuuming, only one room. Objective: See treatment diary below      Assessment: Tolerated treatment fair. Patient demonstrated fatigue post treatment, exhibited good technique with therapeutic exercises and would benefit from continued PT      Plan: Progress treatment as tolerated.        Precautions: Medical History    Diagnosis Date Comment Source   Anesthesia  groggy, difficult to awaken    Asthma      Chronic kidney disease  stage 2 mild    Chronic pain disorder  lumbar herniated discs    Dry eyes      Hyperlipidemia      Hypertension            Manuals   EVAL         L scap PNF in R S/L  L scap PNF in R S/L   L scap PNF in R S/L                                             Neuro Re-Ed                                                                                                        Ther Ex             FIS w/ blue PB  10 x 5 sec hold  20x 20 x 5 sec hold  20x 20x 20 x 5 sec hold      Pulleys  10x 5 sec hold  20x 20x 5 sec hold  20x 20x 20x 5 sec hold      Seated no money  No resistance 10 x 5 sec hold  20x No resistance 20 x 5 sec hold  20x 20x No resistance 20 x 5 sec hold      TB rows, ext, IR,ER  YTB x 20 reps  20x RTB x 20 reps  RTB 4x20 YTB 4x20 RTB 2x10  reps      scap retractions   Supine 10 x 5 sec hold  20x Supine 10 x 5 sec hold 20x 20x Supine 10 x 5 sec hold      L shoulder ER in S/L  2x10 reps  20x 2x10 reps  20x 20x 2x 10 reps           AAROM shoulder flexion 10x supine  20x AAROM shoulder flexion 10x supine (squeezing red ball)                   Ther Activity                                       Gait Training Modalities             CP L shoulder   Supine x 8 min   R S/L x 8 minutes   ----

## 2023-09-15 ENCOUNTER — TELEPHONE (OUTPATIENT)
Dept: OTHER | Facility: HOSPITAL | Age: 88
End: 2023-09-15

## 2023-09-15 ENCOUNTER — OFFICE VISIT (OUTPATIENT)
Dept: URGENT CARE | Facility: CLINIC | Age: 88
End: 2023-09-15
Payer: MEDICARE

## 2023-09-15 ENCOUNTER — TELEPHONE (OUTPATIENT)
Age: 88
End: 2023-09-15

## 2023-09-15 ENCOUNTER — APPOINTMENT (OUTPATIENT)
Dept: RADIOLOGY | Facility: CLINIC | Age: 88
End: 2023-09-15
Payer: MEDICARE

## 2023-09-15 VITALS
RESPIRATION RATE: 14 BRPM | WEIGHT: 126 LBS | TEMPERATURE: 97 F | HEART RATE: 60 BPM | OXYGEN SATURATION: 95 % | SYSTOLIC BLOOD PRESSURE: 138 MMHG | BODY MASS INDEX: 25.4 KG/M2 | DIASTOLIC BLOOD PRESSURE: 63 MMHG | HEIGHT: 59 IN

## 2023-09-15 DIAGNOSIS — S62.014A CLOSED NONDISPLACED FRACTURE OF DISTAL POLE OF SCAPHOID BONE OF RIGHT WRIST, INITIAL ENCOUNTER: Primary | ICD-10-CM

## 2023-09-15 DIAGNOSIS — M25.431 WRIST SWELLING, RIGHT: ICD-10-CM

## 2023-09-15 DIAGNOSIS — J45.40 MODERATE PERSISTENT ASTHMA WITHOUT COMPLICATION: ICD-10-CM

## 2023-09-15 PROCEDURE — 99213 OFFICE O/P EST LOW 20 MIN: CPT | Performed by: PHYSICIAN ASSISTANT

## 2023-09-15 PROCEDURE — 73110 X-RAY EXAM OF WRIST: CPT

## 2023-09-15 NOTE — TELEPHONE ENCOUNTER
Pt fell yesterday and injured her R hand and wrist. It is swollen and she would like to have it evaluated by someone today in the office. She sees Dr. Eliot Martini but she is not in the office today.     Is office willing to overbook patient today for wrist eval?    Please call patient at home  [phone]

## 2023-09-15 NOTE — PATIENT INSTRUCTIONS
Hand Fracture   WHAT YOU NEED TO KNOW:   A hand fracture is a break in a bone in your hand. DISCHARGE INSTRUCTIONS:   Return to the emergency department if:   You have severe pain that does not get better, even with pain medicine. Your injured hand or forearm is cold, numb, or pale. Your cast or splint gets wet, damaged, or comes off. Call your doctor or hand specialist if:   You have new sores around your cast or splint. You notice a bad smell coming from under your cast.    You have questions or concerns about your condition or care. Medicines: You may need any of the following:  NSAIDs  help decrease swelling and pain or fever. This medicine is available with or without a doctor's order. NSAIDs can cause stomach bleeding or kidney problems in certain people. If you take blood thinner medicine, always ask your healthcare provider if NSAIDs are safe for you. Always read the medicine label and follow directions. Acetaminophen  decreases pain and fever. It is available without a doctor's order. Ask how much to take and how often to take it. Follow directions. Read the labels of all other medicines you are using to see if they also contain acetaminophen, or ask your doctor or pharmacist. Acetaminophen can cause liver damage if not taken correctly. Prescription pain medicine  may be given. Ask your healthcare provider how to take this medicine safely. Some prescription pain medicines contain acetaminophen. Do not take other medicines that contain acetaminophen without talking to your healthcare provider. Too much acetaminophen may cause liver damage. Prescription pain medicine may cause constipation. Ask your healthcare provider how to prevent or treat constipation. Take your medicine as directed. Contact your healthcare provider if you think your medicine is not helping or if you have side effects. Tell your provider if you are allergic to any medicine.  Keep a list of the medicines, vitamins, and herbs you take. Include the amounts, and when and why you take them. Bring the list or the pill bottles to follow-up visits. Carry your medicine list with you in case of an emergency. Manage your symptoms:   Wear a splint as directed. Do not remove the splint until you follow up with your healthcare provider or hand specialist.    Apply ice  on your hand for 15 to 20 minutes every hour or as directed. Use an ice pack, or put crushed ice in a plastic bag. Cover it with a towel before you apply it to your skin. Ice helps prevent tissue damage and decreases swelling and pain. Elevate  your hand above the level of your heart as often as you can. This will help decrease swelling and pain. Prop your hand on pillows or blankets to keep it elevated comfortably. Go to physical therapy as directed. A physical therapist teaches you exercises to help improve movement and strength and to decrease pain. Bathing with a cast or splint:  Your healthcare provider will tell you when it is okay to take a bath or shower. Do not let your cast or splint get wet. Before bathing, cover the cast or splint with a plastic bag. Tape the bag to your skin above the cast or splint to seal out water. Keep your hand out of the water in case the bag breaks or tears. Cast or splint care:   Check the skin around the cast or splint for redness or sores every day. Do not push down or lean on any part of the cast or splint. Do not use a sharp or pointed object to scratch your skin under the cast or splint. Activity:  You may not be able to drive for up to 2 weeks. Ask when it is safe for you to drive and return to other activities, such as sports. Follow up with your doctor or hand specialist as directed: You may need to return to have your cast, splint, or stitches removed. Write down your questions so you remember to ask them during your visits.   © Copyright [employer] 2022 Information is for End User's use only and may not be sold, redistributed or otherwise used for commercial purposes. The above information is an  only. It is not intended as medical advice for individual conditions or treatments. Talk to your doctor, nurse or pharmacist before following any medical regimen to see if it is safe and effective for you. R hand/wrist injury:   -There is concern for scaphoid fracture on X-ray and based on the snuffbox tenderness on exam.  Awaiting official read. -Will place in an ortho glass splint today   -Ice the area for 20 minutes 3-4 times a day  -Elevate the area and rest   -You can take Advil or Tylenol for the pain  -Avoid strenuous activity/sports or gym until your symptoms improve  -Follow up with  For further evaluation and management within the next 72 hours. Precautions discussed.

## 2023-09-15 NOTE — PROGRESS NOTES
[address]        NAME: Uyen Lutz is a 80 y.o. female  : 1931    MRN: [id number]  DATE: September 15, 2023  TIME: 1:09 PM    Assessment and Plan   Wrist swelling, right [M25.431]  1. Wrist swelling, right  XR wrist 3+ vw right            Patient Instructions   R hand/wrist injury:   -There is concern for scaphoid fracture on X-ray and based on the snuffbox tenderness on exam.  Awaiting official read. -Will place in an ortho glass splint today   -Ice the area for 20 minutes 3-4 times a day  -Elevate the area and rest   -You can take Advil or Tylenol for the pain  -Avoid strenuous activity/sports or gym until your symptoms improve  -Follow up with  For further evaluation and management within the next 72 hours. Precautions discussed. Follow up with PCP in 3-5 days. Proceed to  ER if symptoms worsen. Chief Complaint     Chief Complaint   Patient presents with   • Arm Injury     Right hand and wrist swelling from a fall occurred yesterday. History of Present Illness       The patient is a 60-year-old female who presents today for R hand swelling and pain s/p fall injury yesterday. The patient states that she fell onto an outstretched hand. The patient has pain over the dorsal aspect of  the thenar eminence. She has bruising and edema over the area. She has limited ROM of the thumb due to the pain. No numbness, weakness or tingling of the hand. No OTC measures attempted. Review of Systems   Review of Systems   Constitutional: Negative for activity change, appetite change, chills, fatigue and fever. Musculoskeletal: Positive for arthralgias. Negative for back pain, gait problem, joint swelling, myalgias, neck pain and neck stiffness. Skin: Positive for color change. Negative for pallor, rash and wound. Allergic/Immunologic: Negative for immunocompromised state. Neurological: Negative for dizziness, weakness, light-headedness and numbness.    Hematological: Does not bruise/bleed easily.          Current Medications       Current Outpatient Medications:   •  albuterol (ProAir HFA) 90 mcg/act inhaler, Inhale 2 puffs every 6 (six) hours as needed for wheezing, Disp: 18 g, Rfl: 1  •  allopurinol (ZYLOPRIM) 100 mg tablet, Take 2 tablets (200 mg total) by mouth daily, Disp: 180 tablet, Rfl: 3  •  amLODIPine (NORVASC) 10 mg tablet, Take 1 tablet (10 mg total) by mouth daily, Disp: 90 tablet, Rfl: 1  •  colchicine (COLCRYS) 0.6 mg tablet, Take 1 tablet (0.6 mg total) by mouth daily for 3 days (Patient not taking: Reported on 7/21/2023), Disp: 3 tablet, Rfl: 0  •  cycloSPORINE (RESTASIS) 0.05 % ophthalmic emulsion, Administer 1 drop to both eyes 2 (two) times a day, Disp: , Rfl:   •  famotidine (PEPCID) 40 MG tablet, Take 1 tablet by mouth once daily, Disp: 30 tablet, Rfl: 0  •  fluticasone (FLONASE) 50 mcg/act nasal spray, USE 1 TO 2 SPRAY(S) IN EACH NOSTRIL TWICE DAILY FOR 30 DAYS, Disp: , Rfl:   •  levothyroxine 50 mcg tablet, Take 1 tablet by mouth once daily, Disp: 90 tablet, Rfl: 1  •  metoprolol succinate (TOPROL-XL) 50 mg 24 hr tablet, TAKE 1 TABLET BY MOUTH ONCE DAILY AT BEDTIME, Disp: 90 tablet, Rfl: 0  •  Qvar RediHaler 80 MCG/ACT inhaler, INHALE 2 PUFFS BY MOUTH IN THE MORNING AND 2 IN THE EVENING RINSE MOUTH AFTER USE, Disp: 11 g, Rfl: 0  •  rosuvastatin (CRESTOR) 5 mg tablet, Take 1 tablet by mouth once daily, Disp: 90 tablet, Rfl: 1  •  spironolactone (ALDACTONE) 25 mg tablet, Take 1 tablet (25 mg total) by mouth every other day, Disp: 45 tablet, Rfl: 0  •  telmisartan (MICARDIS) 80 MG tablet, Take 1 tablet (80 mg total) by mouth daily, Disp: 90 tablet, Rfl: 1    Current Allergies     Allergies as of 09/15/2023 - Reviewed 09/15/2023   Allergen Reaction Noted   • Atorvastatin  11/15/2010   • Azithromycin  01/07/2004   • Codeine Nausea Only 01/07/2004   • Moxifloxacin  04/15/2016   • Penicillins Rash 01/07/2004            The following portions of the patient's history were reviewed and updated as appropriate: allergies, current medications, past family history, past medical history, past social history, past surgical history and problem list.     Past Medical History:   Diagnosis Date   • Anesthesia     groggy, difficult to awaken   • Asthma    • Chronic kidney disease     stage 2 mild   • Chronic pain disorder     lumbar herniated discs   • Dry eyes    • Hyperlipidemia    • Hypertension        Past Surgical History:   Procedure Laterality Date   • APPENDECTOMY     • CATARACT EXTRACTION Bilateral    • COLONOSCOPY N/A 10/25/2017    Procedure: COLONOSCOPY;  Surgeon: Berny Fermin MD;  [facility] [address] [place] [address] [facility]; Service: Gastroenterology   • EPIDURAL BLOCK INJECTION Right 12/14/2022    Procedure: L4 L5 S1  TRANSFORAMINAL epidural steroid injection ([id number]); Surgeon: Joanna Ring DO;  Location: [facility] MAIN OR;  Service: Pain Management    • JOINT REPLACEMENT Bilateral     partial knee   • MI EXC B9 LESION MRGN XCP SK TG T/A/L >4.0 CM Left 6/11/2021    Procedure: EXCISION  BIOPSY LESION/MASS BACK, LEFT BACK, LEFT CHEST WALL;  Surgeon: Omero Fitzgerald MD;  Location: [address] [place] [address] OR;  Service: General       Family History   Problem Relation Age of Onset   • Heart disease Father    • Aneurysm Sister    • Heart disease Brother    • Cancer Daughter         kidney    • Heart disease Brother    • Heart disease Brother    • Heart disease Sister         MI   • Kidney disease Sister    • Heart disease Daughter    • No Known Problems Maternal Aunt    • No Known Problems Maternal Aunt    • No Known Problems Maternal Aunt    • No Known Problems Paternal Aunt    • No Known Problems Paternal Aunt    • Breast cancer Sister 80         Medications have been verified. Objective   /63   Pulse 60   Temp (!) 97 °F (36.1 °C)   Resp 14   Ht 4' 11" (1.499 m)   Wt 57.2 kg (126 lb)   SpO2 95%   BMI 25.45 kg/m²   No LMP recorded.  Patient is postmenopausal.       Physical Exam Physical Exam  Vitals and nursing note reviewed. Constitutional:       General: She is not in acute distress. Appearance: She is well-developed. She is not diaphoretic. Cardiovascular:      Rate and Rhythm: Normal rate and regular rhythm. Heart sounds: Normal heart sounds. Pulmonary:      Effort: Pulmonary effort is normal.      Breath sounds: Normal breath sounds. Musculoskeletal:      Right hand: Swelling and tenderness present. No deformity, lacerations or bony tenderness. Decreased range of motion. Normal strength. Normal sensation. There is no disruption of two-point discrimination. Normal capillary refill. Normal pulse. Comments: R hand: There is tenderness over the snuffbox with mild ecchymosis and edema over the thenar eminence. She has limited ROM of the R thumb. Sensation is intact. Strength is normal. Capillary refill <2s. No crepitus. Skin:     General: Skin is warm and dry. Capillary Refill: Capillary refill takes less than 2 seconds. Findings: Bruising present. No ecchymosis or erythema. Neurological:      General: No focal deficit present. Sensory: No sensory deficit. Motor: No weakness or abnormal muscle tone.       Gait: Gait normal.   Psychiatric:         Behavior: Behavior normal.

## 2023-09-15 NOTE — TELEPHONE ENCOUNTER
Hello, Please advise if the following patient can be forced onto the schedule:    Patient:Nicki     : 1931    MRN: [id number]    Call back #: [phone]    Insurance: Medicare     Reason for appointment: Closed nondisplaced fracture of distal pole of scaphoid bone of right wrist      Requested doctor/location: Cosme Islas / Katherine Uribe       Thank you

## 2023-09-18 ENCOUNTER — OFFICE VISIT (OUTPATIENT)
Dept: OBGYN CLINIC | Facility: CLINIC | Age: 88
End: 2023-09-18
Payer: MEDICARE

## 2023-09-18 VITALS
HEIGHT: 59 IN | WEIGHT: 126 LBS | SYSTOLIC BLOOD PRESSURE: 151 MMHG | DIASTOLIC BLOOD PRESSURE: 67 MMHG | HEART RATE: 51 BPM | BODY MASS INDEX: 25.4 KG/M2

## 2023-09-18 DIAGNOSIS — M18.31 POST-TRAUMATIC OSTEOARTHRITIS OF FIRST CARPOMETACARPAL JOINT OF RIGHT HAND: Primary | ICD-10-CM

## 2023-09-18 PROCEDURE — 99203 OFFICE O/P NEW LOW 30 MIN: CPT | Performed by: ORTHOPAEDIC SURGERY

## 2023-09-18 RX ORDER — BECLOMETHASONE DIPROPIONATE HFA 80 UG/1
AEROSOL, METERED RESPIRATORY (INHALATION)
Qty: 11 G | Refills: 0 | Status: SHIPPED | OUTPATIENT
Start: 2023-09-18

## 2023-09-18 NOTE — PROGRESS NOTES
Assessment/Plan:  1. 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 • Diagnosis discussed with the patient  • Treatment options were discussed. Nonoperative treatment includes splinting, therapy, and use of NSAIDs (oral or topical). Risk, benefits, and realistic expectations following nonoperative treatment were discussed. • The patient was given an opportunity to ask questions. Questions were answered to their satisfaction. • Through shared decision making, the patient decided to move forward with removable splinting using a Comfort Cool brace. • Follow up in 4 weeks for clinical evaluation with Vonda Edge PA-C    cc: My right wrist hurts    Subjective:   Columba Comer is a left hand dominant 80 y.o. female who presents for initial evaluation and treatment of a right wrist injury sustained when she tripped over a tent stake at a dog show and landed on her wrist.   She was seen in the care now on 9/15/2023 for right wrist pain. At that time, x-rays were obtained, and she was diagnosed with a closed nondisplaced fracture of the distal pole of the right scaphoid. The wrist was placed in a splint and she was instructed to follow-up with an orthopedic surgeon. Review of Systems   Constitutional: Negative for chills and fever. HENT: Negative for ear pain and sore throat. Eyes: Negative for pain and visual disturbance. Respiratory: Negative for cough and shortness of breath. Cardiovascular: Negative for chest pain and palpitations. Gastrointestinal: Negative for abdominal pain and vomiting. Genitourinary: Negative for dysuria and hematuria. Musculoskeletal: Positive for arthralgias (Right wrist). Negative for back pain. Skin: Negative for color change and rash. Neurological: Negative for seizures and syncope. All other systems reviewed and are negative. Past Medical History:   Diagnosis Date   • Anesthesia     groggy, difficult to awaken   • Asthma    • Chronic kidney disease     stage 2 mild   • Chronic pain disorder     lumbar herniated discs   • Dry eyes    • Hyperlipidemia    • Hypertension        Past Surgical History:   Procedure Laterality Date   • APPENDECTOMY     • CATARACT EXTRACTION Bilateral    • COLONOSCOPY N/A 10/25/2017    Procedure: COLONOSCOPY;  Surgeon: Wiley Lozano MD;  Location: [address] [place] [address] GI LAB; Service: Gastroenterology   • EPIDURAL BLOCK INJECTION Right 12/14/2022    Procedure: L4 L5 S1  TRANSFORAMINAL epidural steroid injection ([id number]);   Surgeon: Misti Kelley DO;  Location: [facility] MAIN OR;  Service: Pain Management    • JOINT REPLACEMENT Bilateral     partial knee   • IN EXC B9 LESION MRGN XCP SK TG T/A/L >4.0 CM Left 6/11/2021    Procedure: EXCISION  BIOPSY LESION/MASS BACK, LEFT BACK, LEFT CHEST WALL;  Surgeon: Nuris Koroma MD;  Location: [address] [place] [address] OR;  Service: General       Family History   Problem Relation Age of Onset   • Heart disease Father    • Aneurysm Sister    • Heart disease Brother    • Cancer Daughter         kidney    • Heart disease Brother    • Heart disease Brother    • Heart disease Sister         MI   • Kidney disease Sister    • Heart disease Daughter    • No Known Problems Maternal Aunt    • No Known Problems Maternal Aunt    • No Known Problems Maternal Aunt    • No Known Problems Paternal Aunt    • No Known Problems Paternal Aunt    • Breast cancer Sister 80       Social History     Occupational History   • Not on file   Tobacco Use   • Smoking status: Never   • Smokeless tobacco: Never   Vaping Use   • Vaping Use: Never used   Substance and Sexual Activity   • Alcohol use: Not Currently   • Drug use: No   • Sexual activity: Not on file         Current Outpatient Medications:   •  albuterol (ProAir HFA) 90 mcg/act inhaler, Inhale 2 puffs every 6 (six) hours as needed for wheezing, Disp: 18 g, Rfl: 1  •  allopurinol (ZYLOPRIM) 100 mg tablet, Take 2 tablets (200 mg total) by mouth daily, Disp: 180 tablet, Rfl: 3  •  amLODIPine (NORVASC) 10 mg tablet, Take 1 tablet (10 mg total) by mouth daily, Disp: 90 tablet, Rfl: 1  •  cycloSPORINE (RESTASIS) 0.05 % ophthalmic emulsion, Administer 1 drop to both eyes 2 (two) times a day, Disp: , Rfl:   •  famotidine (PEPCID) 40 MG tablet, Take 1 tablet by mouth once daily, Disp: 30 tablet, Rfl: 0  •  fluticasone (FLONASE) 50 mcg/act nasal spray, USE 1 TO 2 SPRAY(S) IN EACH NOSTRIL TWICE DAILY FOR 30 DAYS, Disp: , Rfl:   •  levothyroxine 50 mcg tablet, Take 1 tablet by mouth once daily, Disp: 90 tablet, Rfl: 1  •  metoprolol succinate (TOPROL-XL) 50 mg 24 hr tablet, TAKE 1 TABLET BY MOUTH ONCE DAILY AT BEDTIME, Disp: 90 tablet, Rfl: 0  •  Qvar RediHaler 80 MCG/ACT inhaler, INHALE 2 PUFFS BY MOUTH IN THE MORNING AND 2 PUFFS IN THE EVENING RINSE  MOUTH  AFTER  USE, Disp: 11 g, Rfl: 0  •  spironolactone (ALDACTONE) 25 mg tablet, Take 1 tablet (25 mg total) by mouth every other day, Disp: 45 tablet, Rfl: 0  •  telmisartan (MICARDIS) 80 MG tablet, Take 1 tablet (80 mg total) by mouth daily, Disp: 90 tablet, Rfl: 1  •  colchicine (COLCRYS) 0.6 mg tablet, Take 1 tablet (0.6 mg total) by mouth daily for 3 days (Patient not taking: Reported on 7/21/2023), Disp: 3 tablet, Rfl: 0  •  rosuvastatin (CRESTOR) 5 mg tablet, Take 1 tablet by mouth once daily (Patient not taking: Reported on 9/18/2023), Disp: 90 tablet, Rfl: 1    Allergies   Allergen Reactions   • Atorvastatin      Other reaction(s): Leg Cramps  Reaction Date: [id number];    • Azithromycin      Other reaction(s): Unknown Allergic Reaction  Reaction Date: [id number]; Annotation - [id number]: ham   • Codeine Nausea Only     Reaction Date: [id number];  Annotation - [id number]: ham   • Moxifloxacin      Other reaction(s): Diarrhea   • Penicillins Rash     Reaction Date: [id number]; Annotation - [id number]: jjw       Objective:  Vitals:    09/18/23 1320   BP: 151/67   Pulse: (!) 51       The patient was awake, alert, and oriented to person, place, and time. No acute distress. Normocephalic. EOMI. Mucous membranes moist.  Normal respiratory effort. Examination of the affected extremity was compared to the unaffected extremity. Skin was intact. Moderate swelling. No ecchymosis. No deformity. Hand and fingers were warm and well-perfused. Capillary refill was brisk. Full active range of motion of the elbows, forearms, wrists, and fingers. There was tenderness to palpation at the anatomic snuffbox and at the distal pole of the scaphoid/thumb [facility] joint. Imaging/Diagnostic Studies:    I reviewed imaging studies dated 9/15/2023 which included 4 views. There were degenerative changes seen at the thumb [facility] joint as well as the STT joint. There is a possible avulsion fracture of the distal pole of the scaphoid; however, it is not clear if finding this is acute or chronic in nature. Scribe Attestation    I,:  Josh Beverly am acting as a scribe while in the presence of the attending physician.:       I,:  Enriqueta Alex MD personally performed the services described in this documentation    as scribed in my presence.:         This document was created using speech voice recognition software. Grammatical errors, random word insertions, pronoun errors, and incomplete sentences are an occasional consequence of this system due to software limitations, ambient noise, and hardware issues.    Any formal questions or concerns about content, text, or information contained within the body of this dictation should be directly addressed to the provider for clarification.'

## 2023-09-19 ENCOUNTER — OFFICE VISIT (OUTPATIENT)
Dept: FAMILY MEDICINE CLINIC | Facility: CLINIC | Age: 88
End: 2023-09-19
Payer: MEDICARE

## 2023-09-19 VITALS
DIASTOLIC BLOOD PRESSURE: 60 MMHG | SYSTOLIC BLOOD PRESSURE: 160 MMHG | RESPIRATION RATE: 16 BRPM | TEMPERATURE: 97.6 F | WEIGHT: 123.6 LBS | HEIGHT: 59 IN | BODY MASS INDEX: 24.92 KG/M2 | HEART RATE: 60 BPM

## 2023-09-19 DIAGNOSIS — M1A.9XX0 CHRONIC GOUT WITHOUT TOPHUS, UNSPECIFIED CAUSE, UNSPECIFIED SITE: ICD-10-CM

## 2023-09-19 DIAGNOSIS — M81.0 OSTEOPOROSIS, UNSPECIFIED OSTEOPOROSIS TYPE, UNSPECIFIED PATHOLOGICAL FRACTURE PRESENCE: ICD-10-CM

## 2023-09-19 DIAGNOSIS — N18.2 CHRONIC KIDNEY DISEASE, STAGE II (MILD): ICD-10-CM

## 2023-09-19 DIAGNOSIS — E78.2 MIXED HYPERLIPIDEMIA: ICD-10-CM

## 2023-09-19 DIAGNOSIS — R73.01 IMPAIRED FASTING GLUCOSE: ICD-10-CM

## 2023-09-19 DIAGNOSIS — Z00.00 MEDICARE ANNUAL WELLNESS VISIT, SUBSEQUENT: ICD-10-CM

## 2023-09-19 DIAGNOSIS — I10 BENIGN ESSENTIAL HYPERTENSION: Primary | ICD-10-CM

## 2023-09-19 DIAGNOSIS — E03.8 OTHER SPECIFIED HYPOTHYROIDISM: ICD-10-CM

## 2023-09-19 PROCEDURE — 99214 OFFICE O/P EST MOD 30 MIN: CPT | Performed by: FAMILY MEDICINE

## 2023-09-19 PROCEDURE — G0439 PPPS, SUBSEQ VISIT: HCPCS | Performed by: FAMILY MEDICINE

## 2023-09-19 NOTE — PATIENT INSTRUCTIONS
Medicare Preventive Visit Patient Instructions  Thank you for completing your Welcome to Medicare Visit or Medicare Annual Wellness Visit today. Your next wellness visit will be due in one year (9/19/2024). The screening/preventive services that you may require over the next 5-10 years are detailed below. Some tests may not apply to you based off risk factors and/or age. Screening tests ordered at today's visit but not completed yet may show as past due. Also, please note that scanned in results may not display below. Preventive Screenings:  Service Recommendations Previous Testing/Comments   Colorectal Cancer Screening  * Colonoscopy    * Fecal Occult Blood Test (FOBT)/Fecal Immunochemical Test (FIT)  * Fecal DNA/Cologuard Test  * Flexible Sigmoidoscopy Age: 43-73 years old   Colonoscopy: every 10 years (may be performed more frequently if at higher risk)  OR  FOBT/FIT: every 1 year  OR  Cologuard: every 3 years  OR  Sigmoidoscopy: every 5 years  Screening may be recommended earlier than age 39 if at higher risk for colorectal cancer. Also, an individualized decision between you and your healthcare provider will decide whether screening between the ages of 77-80 would be appropriate. Colonoscopy: Not on file  FOBT/FIT: Not on file  Cologuard: Not on file  Sigmoidoscopy: Not on file          Breast Cancer Screening Age: 36 years old  Frequency: every 1-2 years  Not required if history of left and right mastectomy Mammogram: 02/07/2023        Cervical Cancer Screening Between the ages of 21-29, pap smear recommended once every 3 years. Between the ages of 32-69, can perform pap smear with HPV co-testing every 5 years.    Recommendations may differ for women with a history of total hysterectomy, cervical cancer, or abnormal pap smears in past. Pap Smear: Not on file        Hepatitis C Screening Once for adults born between [address] [place] [address]  More frequently in patients at high risk for Hepatitis C Hep C Antibody: Not on file        Diabetes Screening 1-2 times per year if you're at risk for diabetes or have pre-diabetes Fasting glucose: 108 mg/dL (8/25/2023)  A1C: 6.2 % (2/21/2023)      Cholesterol Screening Once every 5 years if you don't have a lipid disorder. May order more often based on risk factors. Lipid panel: 02/21/2023          Other Preventive Screenings Covered by Medicare:  1. Abdominal Aortic Aneurysm (AAA) Screening: covered once if your at risk. You're considered to be at risk if you have a family history of AAA. 2. Lung Cancer Screening: covers low dose CT scan once per year if you meet all of the following conditions: (1) Age 48-67; (2) No signs or symptoms of lung cancer; (3) Current smoker or have quit smoking within the last 15 years; (4) You have a tobacco smoking history of at least 20 pack years (packs per day multiplied by number of years you smoked); (5) You get a written order from a healthcare provider. 3. Glaucoma Screening: covered annually if you're considered high risk: (1) You have diabetes OR (2) Family history of glaucoma OR (3)  aged 48 and older OR (3)  American aged 72 and older  3. Osteoporosis Screening: covered every 2 years if you meet one of the following conditions: (1) You're estrogen deficient and at risk for osteoporosis based off medical history and other findings; (2) Have a vertebral abnormality; (3) On glucocorticoid therapy for more than 3 months; (4) Have primary hyperparathyroidism; (5) On osteoporosis medications and need to assess response to drug therapy. · Last bone density test (DXA Scan): 02/26/2019.  5. HIV Screening: covered annually if you're between the age of 15-65. Also covered annually if you are younger than 13 and older than 72 with risk factors for HIV infection. For pregnant patients, it is covered up to 3 times per pregnancy.     Immunizations:  Immunization Recommendations   Influenza Vaccine Annual influenza vaccination during flu season is recommended for all persons aged >= 6 months who do not have contraindications   Pneumococcal Vaccine   * Pneumococcal conjugate vaccine = PCV13 (Prevnar 13), PCV15 (Vaxneuvance), PCV20 (Prevnar 20)  * Pneumococcal polysaccharide vaccine = PPSV23 (Pneumovax) Adults 20-63 years old: 1-3 doses may be recommended based on certain risk factors  Adults 72 years old: 1-2 doses may be recommended based off what pneumonia vaccine you previously received   Hepatitis B Vaccine 3 dose series if at intermediate or high risk (ex: diabetes, end stage renal disease, liver disease)   Tetanus (Td) Vaccine - COST NOT COVERED BY MEDICARE PART B Following completion of primary series, a booster dose should be given every 10 years to maintain immunity against tetanus. Td may also be given as tetanus wound prophylaxis. Tdap Vaccine - COST NOT COVERED BY MEDICARE PART B Recommended at least once for all adults. For pregnant patients, recommended with each pregnancy. Shingles Vaccine (Shingrix) - COST NOT COVERED BY MEDICARE PART B  2 shot series recommended in those aged 48 and above     Health Maintenance Due:      Topic Date Due   • Breast Cancer Screening: Mammogram  02/07/2024     Immunizations Due:      Topic Date Due   • COVID-19 Vaccine (4 - Pfizer series) 12/03/2021   • Influenza Vaccine (1) 09/01/2023     Advance Directives   What are advance directives? Advance directives are legal documents that state your wishes and plans for medical care. These plans are made ahead of time in case you lose your ability to make decisions for yourself. Advance directives can apply to any medical decision, such as the treatments you want, and if you want to donate organs. What are the types of advance directives? There are many types of advance directives, and each state has rules about how to use them. You may choose a combination of any of the following:  · Living will:   This is a written record of the treatment you want. You can also choose which treatments you do not want, which to limit, and which to stop at a certain time. This includes surgery, medicine, IV fluid, and tube feedings. · Durable power of  for healthcare [facility]): This is a written record that states who you want to make healthcare choices for you when you are unable to make them for yourself. This person, called a proxy, is usually a family member or a friend. You may choose more than 1 proxy. · Do not resuscitate (DNR) order:  A DNR order is used in case your heart stops beating or you stop breathing. It is a request not to have certain forms of treatment, such as CPR. A DNR order may be included in other types of advance directives. · Medical directive: This covers the care that you want if you are in a coma, near death, or unable to make decisions for yourself. You can list the treatments you want for each condition. Treatment may include pain medicine, surgery, blood transfusions, dialysis, IV or tube feedings, and a ventilator (breathing machine). · Values history: This document has questions about your views, beliefs, and how you feel and think about life. This information can help others choose the care that you would choose. Why are advance directives important? An advance directive helps you control your care. Although spoken wishes may be used, it is better to have your wishes written down. Spoken wishes can be misunderstood, or not followed. Treatments may be given even if you do not want them. An advance directive may make it easier for your family to make difficult choices about your care. © Copyright "[employer] 2018 Information is for End User's use only and may not be sold, redistributed or otherwise used for commercial purposes.  All illustrations and images included in CareNotes® are the copyrighted property of [employer] or  [employer]

## 2023-09-19 NOTE — PROGRESS NOTES
Assessment and Plan:     Problem List Items Addressed This Visit        Endocrine    Other specified hypothyroidism    Relevant Orders    TSH, 3rd generation    Impaired fasting glucose    Relevant Orders    Hemoglobin A1C       Cardiovascular and Mediastinum    Benign essential hypertension - Primary    Relevant Orders    Comprehensive metabolic panel       Musculoskeletal and Integument    Osteoporosis    Relevant Orders    DXA bone density spine hip and pelvis       Genitourinary    Chronic kidney disease, stage II (mild)    Relevant Orders    Comprehensive metabolic panel       Other    Mixed hyperlipidemia    Relevant Orders    Lipid panel    Chronic gout without tophus    Relevant Orders    Uric acid   Other Visit Diagnoses     Medicare annual wellness visit, subsequent          all above are stable   Cont meds    Depression Screening and Follow-up Plan: Patient was screened for depression during today's encounter. They screened negative with a PHQ-2 score of 0. Preventive health issues were discussed with patient, and age appropriate screening tests were ordered as noted in patient's After Visit Summary. Personalized health advice and appropriate referrals for health education or preventive services given if needed, as noted in patient's After Visit Summary. History of Present Illness:     Patient presents for a Medicare Wellness Visit    Pt is seeing for f/u HTN, Gout, IFG, Osteoporosis, HLD, Hypothyroidism -  All stable -  BP at home in 140/80s occasionally in 160s syst      Patient Care Team:  Michele Fisher MD as PCP - MD Aric Olivera MD as Endoscopist     Review of Systems:     Review of Systems   Constitutional: Negative. Respiratory: Negative. Cardiovascular: Negative. Gastrointestinal: Negative. Genitourinary: Negative.     Musculoskeletal: Positive for arthralgias (R wrist x 1 wk after the fall -  negative xrays for Fx -  went to  -  slowly improving ). Negative for back pain and joint swelling. Skin: Negative. Neurological: Negative. Problem List:     Patient Active Problem List   Diagnosis   • Asthma   • Benign essential hypertension   • Chronic kidney disease, stage II (mild)   • Mixed hyperlipidemia   • Osteoporosis   • Other specified hypothyroidism   • Abnormal CT of the abdomen   • Epigastric pain   • Abdominal bloating   • Delayed emergence from anesthesia   • Back pain   • Lipoma of back   • Heart murmur   • Lumbar radiculopathy   • Post-traumatic osteoarthritis of first carpometacarpal joint of right hand   • Chronic gout without tophus   • Impaired fasting glucose      Past Medical and Surgical History:     Past Medical History:   Diagnosis Date   • Anesthesia     groggy, difficult to awaken   • Asthma    • Chronic kidney disease     stage 2 mild   • Chronic pain disorder     lumbar herniated discs   • Dry eyes    • Hyperlipidemia    • Hypertension      Past Surgical History:   Procedure Laterality Date   • APPENDECTOMY     • CATARACT EXTRACTION Bilateral    • COLONOSCOPY N/A 10/25/2017    Procedure: COLONOSCOPY;  Surgeon: Senia Perez MD;  Location: [address] [place] [address] [facility]; Service: Gastroenterology   • EPIDURAL BLOCK INJECTION Right 12/14/2022    Procedure: L4 L5 S1  TRANSFORAMINAL epidural steroid injection ([id number]);   Surgeon: Pavithra Schafer DO;  Location: [facility] MAIN OR;  Service: Pain Management    • JOINT REPLACEMENT Bilateral     partial knee   • MO EXC B9 LESION MRGN XCP SK TG T/A/L >4.0 CM Left 6/11/2021    Procedure: EXCISION  BIOPSY LESION/MASS BACK, LEFT BACK, LEFT CHEST WALL;  Surgeon: Colleen Ca MD;  Location: [facility] MAIN OR;  Service: General      Family History:     Family History   Problem Relation Age of Onset   • Heart disease Father    • Aneurysm Sister    • Heart disease Brother    • Cancer Daughter         kidney    • Heart disease Brother    • Heart disease Brother    • Heart disease Sister         MI   • Kidney disease Sister    • Heart disease Daughter    • No Known Problems Maternal Aunt    • No Known Problems Maternal Aunt    • No Known Problems Maternal Aunt    • No Known Problems Paternal Aunt    • No Known Problems Paternal Aunt    • Breast cancer Sister 80      Social History:     Social History     Socioeconomic History   • Marital status:      Spouse name: None   • Number of children: None   • Years of education: None   • Highest education level: None   Occupational History   • None   Tobacco Use   • Smoking status: Never   • Smokeless tobacco: Never   Vaping Use   • Vaping Use: Never used   Substance and Sexual Activity   • Alcohol use: Not Currently   • Drug use: No   • Sexual activity: None   Other Topics Concern   • None   Social History Narrative   • None     Social Determinants of Health     Financial Resource Strain: Low Risk  (9/19/2023)    Overall Financial Resource Strain (CARDIA)    • Difficulty of Paying Living Expenses: Not hard at all   Food Insecurity: Not on file   Transportation Needs: No Transportation Needs (9/19/2023)    PRAPARE - Transportation    • Lack of Transportation (Medical): No    • Lack of Transportation (Non-Medical):  No   Physical Activity: Not on file   Stress: Not on file   Social Connections: Not on file   Intimate Partner Violence: Not on file   Housing Stability: Not on file      Medications and Allergies:     Current Outpatient Medications   Medication Sig Dispense Refill   • albuterol (ProAir HFA) 90 mcg/act inhaler Inhale 2 puffs every 6 (six) hours as needed for wheezing 18 g 1   • allopurinol (ZYLOPRIM) 100 mg tablet Take 2 tablets (200 mg total) by mouth daily 180 tablet 3   • amLODIPine (NORVASC) 10 mg tablet Take 1 tablet (10 mg total) by mouth daily 90 tablet 1   • levothyroxine 50 mcg tablet Take 1 tablet by mouth once daily 90 tablet 1   • metoprolol succinate (TOPROL-XL) 50 mg 24 hr tablet TAKE 1 TABLET BY MOUTH ONCE DAILY AT BEDTIME 90 tablet 0   • Qvar RediHaler 80 MCG/ACT inhaler INHALE 2 PUFFS BY MOUTH IN THE MORNING AND 2 PUFFS IN THE EVENING RINSE  MOUTH  AFTER  USE 11 g 0   • rosuvastatin (CRESTOR) 5 mg tablet Take 1 tablet by mouth once daily 90 tablet 1   • spironolactone (ALDACTONE) 25 mg tablet Take 1 tablet (25 mg total) by mouth every other day 45 tablet 0   • telmisartan (MICARDIS) 80 MG tablet Take 1 tablet (80 mg total) by mouth daily 90 tablet 1   • colchicine (COLCRYS) 0.6 mg tablet Take 1 tablet (0.6 mg total) by mouth daily for 3 days (Patient not taking: Reported on 7/21/2023) 3 tablet 0   • cycloSPORINE (RESTASIS) 0.05 % ophthalmic emulsion Administer 1 drop to both eyes 2 (two) times a day     • famotidine (PEPCID) 40 MG tablet Take 1 tablet by mouth once daily 30 tablet 0   • fluticasone (FLONASE) 50 mcg/act nasal spray USE 1 TO 2 SPRAY(S) IN EACH NOSTRIL TWICE DAILY FOR 30 DAYS (Patient not taking: Reported on 9/19/2023)     • levothyroxine 50 mcg tablet Take 1 tablet by mouth once daily 90 tablet 1   • rosuvastatin (CRESTOR) 5 mg tablet Take 1 tablet by mouth once daily 90 tablet 1   • telmisartan (MICARDIS) 80 MG tablet Take 1 tablet (80 mg total) by mouth daily 90 tablet 1     No current facility-administered medications for this visit. Allergies   Allergen Reactions   • Atorvastatin      Other reaction(s): Leg Cramps  Reaction Date: [id number];    • Azithromycin      Other reaction(s): Unknown Allergic Reaction  Reaction Date: [id number]; Annotation - [id number]: jjw   • Codeine Nausea Only     Reaction Date: [id number]; Annotation - [id number]: jjw   • Moxifloxacin      Other reaction(s): Diarrhea   • Penicillins Rash     Reaction Date: [id number];  Annotation - [id number]: jjw      Immunizations:     Immunization History   Administered Date(s) Administered   • COVID-19 PFIZER VACCINE 0.3 ML IM 01/23/2021, 02/13/2021, 10/08/2021   • Influenza Split High Dose Preservative Free IM 09/23/2014, 01/04/2016   • Influenza, high dose seasonal 0.7 mL 10/26/2020, 11/17/2022   • Influenza, injectable, quadrivalent, preservative free 0.5 mL 02/11/2019   • Influenza, recombinant, quadrivalent,injectable, preservative free 10/18/2019, 11/09/2021   • Influenza, seasonal, injectable 12/13/2007, 09/25/2008, 11/13/2012   • Pneumococcal Conjugate 13-Valent 06/05/2017   • Pneumococcal Polysaccharide PPV23 12/13/2007   • influenza, injectable, quadrivalent 10/18/2019, 10/26/2020      Health Maintenance:         Topic Date Due   • Breast Cancer Screening: Mammogram  02/07/2024         Topic Date Due   • COVID-19 Vaccine (4 - Pfizer series) 12/03/2021   • Influenza Vaccine (1) 09/01/2023      Medicare Screening Tests and Risk Assessments:     Gentry Mcgraw is here for her Subsequent Wellness visit. Health Risk Assessment:   Patient rates overall health as fair. Patient feels that their physical health rating is same. Patient is very satisfied with their life. Eyesight was rated as same. Hearing was rated as same. Patient feels that their emotional and mental health rating is same. Patients states they are never, rarely angry. Patient states they are sometimes unusually tired/fatigued. Pain experienced in the last 7 days has been some. Patient's pain rating has been 7/10. Patient states that she has experienced no weight loss or gain in last 6 months. R wrist pain after the fall 1 wk ago -  Xray were done -  No FX     Depression Screening:   PHQ-2 Score: 0      Fall Risk Screening: In the past year, patient has experienced: history of falling in past year    Number of falls: 1  Injured during fall?: No    Feels unsteady when standing or walking?: No    Worried about falling?: No      Urinary Incontinence Screening:   Patient has not leaked urine accidently in the last six months. Home Safety:  Patient does not have trouble with stairs inside or outside of their home. Patient has working smoke alarms and has working carbon monoxide detector.  Home safety hazards include: none. Nutrition:   Current diet is Low Cholesterol and No Added Salt. Medications:   Patient is not currently taking any over-the-counter supplements. Patient is able to manage medications. Activities of Daily Living (ADLs)/Instrumental Activities of Daily Living (IADLs):   Walk and transfer into and out of bed and chair?: Yes  Dress and groom yourself?: Yes    Bathe or shower yourself?: Yes    Feed yourself? Yes  Do your laundry/housekeeping?: Yes  Manage your money, pay your bills and track your expenses?: Yes  Make your own meals?: Yes    Do your own shopping?: Yes    Previous Hospitalizations:   Any hospitalizations or ED visits within the last 12 months?: No      Advance Care Planning:   Living will: Yes    Durable POA for healthcare: Yes    Advanced directive: Yes      Cognitive Screening:   Provider or family/friend/caregiver concerned regarding cognition?: No    PREVENTIVE SCREENINGS      Cardiovascular Screening:    General: Screening Not Indicated and History Lipid Disorder      Diabetes Screening:     General: Screening Current      Colorectal Cancer Screening:     General: Screening Not Indicated      Breast Cancer Screening:     General: Screening Current      Cervical Cancer Screening:    General: Screening Not Indicated      Osteoporosis Screening:    General: Screening Not Indicated and History Osteoporosis      Lung Cancer Screening:     General: Screening Not Indicated    Screening, Brief Intervention, and Referral to Treatment (SBIRT)    Screening  Typical number of drinks in a day: 0  Typical number of drinks in a week: 0  Interpretation: Low risk drinking behavior. Single Item Drug Screening:  How often have you used an illegal drug (including marijuana) or a prescription medication for non-medical reasons in the past year? never    Single Item Drug Screen Score: 0  Interpretation: Negative screen for possible drug use disorder    No results found.      Physical Exam: /60 (BP Location: Left arm, Patient Position: Sitting, Cuff Size: Standard)   Pulse 60   Temp 97.6 °F (36.4 °C)   Resp 16   Ht 4' 11" (1.499 m)   Wt 56.1 kg (123 lb 9.6 oz)   BMI 24.96 kg/m²     Physical Exam  Constitutional:       General: She is not in acute distress. Appearance: She is not ill-appearing. Cardiovascular:      Rate and Rhythm: Normal rate and regular rhythm. Heart sounds: Murmur heard. Systolic murmur is present with a grade of 2/6. Pulmonary:      Effort: Pulmonary effort is normal. No respiratory distress. Breath sounds: No wheezing, rhonchi or rales. Musculoskeletal:      Right lower leg: No edema. Left lower leg: No edema. Neurological:      General: No focal deficit present. Mental Status: She is alert and oriented to person, place, and time. Psychiatric:         Mood and Affect: Mood normal.         Thought Content:  Thought content normal.         Judgment: Judgment normal.          Mitch Guevara MD

## 2023-10-10 DIAGNOSIS — E03.8 SUBCLINICAL HYPOTHYROIDISM: ICD-10-CM

## 2023-10-11 RX ORDER — LEVOTHYROXINE SODIUM 0.05 MG/1
TABLET ORAL
Qty: 90 TABLET | Refills: 0 | Status: SHIPPED | OUTPATIENT
Start: 2023-10-11

## 2023-10-17 ENCOUNTER — TELEPHONE (OUTPATIENT)
Age: 88
End: 2023-10-17

## 2023-10-17 NOTE — TELEPHONE ENCOUNTER
Patient called and states has headache, vomiting, diarrhea and has not eaten in 3 days. Only relief she gets is when laying down.  Scheduled patient for 10/18/2023 appointment

## 2023-10-18 ENCOUNTER — TELEPHONE (OUTPATIENT)
Dept: FAMILY MEDICINE CLINIC | Facility: CLINIC | Age: 88
End: 2023-10-18

## 2023-10-18 ENCOUNTER — OFFICE VISIT (OUTPATIENT)
Dept: FAMILY MEDICINE CLINIC | Facility: CLINIC | Age: 88
End: 2023-10-18
Payer: MEDICARE

## 2023-10-18 VITALS
HEIGHT: 59 IN | SYSTOLIC BLOOD PRESSURE: 140 MMHG | OXYGEN SATURATION: 97 % | HEART RATE: 58 BPM | DIASTOLIC BLOOD PRESSURE: 70 MMHG | BODY MASS INDEX: 24.8 KG/M2 | WEIGHT: 123 LBS | TEMPERATURE: 98.3 F | RESPIRATION RATE: 14 BRPM

## 2023-10-18 DIAGNOSIS — R19.7 DIARRHEA, UNSPECIFIED TYPE: ICD-10-CM

## 2023-10-18 DIAGNOSIS — I10 ESSENTIAL HYPERTENSION: ICD-10-CM

## 2023-10-18 DIAGNOSIS — R11.2 NAUSEA AND VOMITING, UNSPECIFIED VOMITING TYPE: Primary | ICD-10-CM

## 2023-10-18 DIAGNOSIS — J45.40 MODERATE PERSISTENT ASTHMA WITHOUT COMPLICATION: ICD-10-CM

## 2023-10-18 PROCEDURE — 99213 OFFICE O/P EST LOW 20 MIN: CPT | Performed by: FAMILY MEDICINE

## 2023-10-18 RX ORDER — METOPROLOL SUCCINATE 25 MG/1
25 TABLET, EXTENDED RELEASE ORAL
Qty: 90 TABLET | Refills: 1 | Status: SHIPPED | OUTPATIENT
Start: 2023-10-18 | End: 2024-04-15

## 2023-10-18 RX ORDER — BECLOMETHASONE DIPROPIONATE HFA 80 UG/1
AEROSOL, METERED RESPIRATORY (INHALATION)
Qty: 33 G | Refills: 3 | Status: SHIPPED | OUTPATIENT
Start: 2023-10-18 | End: 2023-10-20 | Stop reason: SDUPTHER

## 2023-10-18 NOTE — PROGRESS NOTES
Chief Complaint   Patient presents with   • Nausea     Vomiting diarrhea x3 days pt states feeling better today and eating   • Headache        Patient ID: Florencia Manuel is a 80 y.o. female. HPI  Pt is seeing for f/u N/V/D started 5 days ago -  lasted 3 days -  was unable to keep any food down -  feeling better last 2 days -  able to drink water and eat, still have residual diarrhea  -  no fever, took COVID test 4 days ago at home -  negative -  today is feeling better overall but developed muscle aches this am     The following portions of the patient's history were reviewed and updated as appropriate: allergies, current medications, past family history, past medical history, past social history, past surgical history and problem list.    Review of Systems   Constitutional: Negative. Respiratory: Negative. Cardiovascular: Negative. Gastrointestinal:  Negative for abdominal pain.        Current Outpatient Medications   Medication Sig Dispense Refill   • albuterol (ProAir HFA) 90 mcg/act inhaler Inhale 2 puffs every 6 (six) hours as needed for wheezing 18 g 1   • allopurinol (ZYLOPRIM) 100 mg tablet Take 2 tablets (200 mg total) by mouth daily 180 tablet 3   • amLODIPine (NORVASC) 10 mg tablet Take 1 tablet (10 mg total) by mouth daily 90 tablet 1   • cycloSPORINE (RESTASIS) 0.05 % ophthalmic emulsion Administer 1 drop to both eyes 2 (two) times a day     • famotidine (PEPCID) 40 MG tablet Take 1 tablet by mouth once daily 30 tablet 0   • fluticasone (FLONASE) 50 mcg/act nasal spray      • levothyroxine 50 mcg tablet Take 1 tablet by mouth once daily 90 tablet 0   • metoprolol succinate (TOPROL-XL) 50 mg 24 hr tablet TAKE 1 TABLET BY MOUTH ONCE DAILY AT BEDTIME 90 tablet 0   • Qvar RediHaler 80 MCG/ACT inhaler INHALE 2 PUFFS BY MOUTH IN THE MORNING AND 2 PUFFS IN THE EVENING RINSE  MOUTH  AFTER  USE 11 g 0   • rosuvastatin (CRESTOR) 5 mg tablet Take 1 tablet by mouth once daily 90 tablet 1   • spironolactone (ALDACTONE) 25 mg tablet Take 1 tablet (25 mg total) by mouth every other day 45 tablet 0   • telmisartan (MICARDIS) 80 MG tablet Take 1 tablet (80 mg total) by mouth daily 90 tablet 1   • colchicine (COLCRYS) 0.6 mg tablet Take 1 tablet (0.6 mg total) by mouth daily for 3 days (Patient not taking: Reported on 7/21/2023) 3 tablet 0     No current facility-administered medications for this visit. Objective:    /70 (BP Location: Left arm, Patient Position: Sitting, Cuff Size: Standard)   Pulse 58   Temp 98.3 °F (36.8 °C) (Temporal)   Resp 14   Ht 4' 11" (1.499 m)   Wt 55.8 kg (123 lb)   SpO2 97%   BMI 24.84 kg/m²        Physical Exam  Constitutional:       Appearance: She is not ill-appearing. Cardiovascular:      Rate and Rhythm: Normal rate and regular rhythm. Pulmonary:      Effort: Pulmonary effort is normal. No respiratory distress. Abdominal:      Palpations: Abdomen is soft. Tenderness: There is no abdominal tenderness. Skin:     Coloration: Skin is not pale. Findings: No rash. Neurological:      Mental Status: She is alert. Psychiatric:         Mood and Affect: Mood normal.                 Assessment/Plan:         Diagnoses and all orders for this visit:    Nausea and vomiting, unspecified vomiting type   resolved  Diarrhea, unspecified type   Cont with oral hydration   Essential hypertension  -     metoprolol succinate (TOPROL-XL) 25 mg 24 hr tablet; Take 1 tablet (25 mg total) by mouth daily at bedtime    Moderate persistent asthma without complication  -     beclomethasone (Qvar RediHaler) 80 MCG/ACT inhaler; Rinse mouth after use.           Rto prn                   Cosmo Hilario MD

## 2023-10-18 NOTE — TELEPHONE ENCOUNTER
LEFT MESSAGE TO RETURN CALL Apt today 11:45am pt needs evaluation in ER for vomiting/diarrhea. Left message with neighbor Peg to call office.

## 2023-10-19 ENCOUNTER — TELEPHONE (OUTPATIENT)
Age: 88
End: 2023-10-19

## 2023-10-19 NOTE — TELEPHONE ENCOUNTER
[facility] DR TABITHA LOVING called in regards to patients QVAR inhaler, there are no instructions other then rinse mouth after use. They need a sig please call in new corrected script or call them to give them instructions. Thank you.

## 2023-10-20 DIAGNOSIS — J45.40 MODERATE PERSISTENT ASTHMA WITHOUT COMPLICATION: ICD-10-CM

## 2023-10-20 RX ORDER — BECLOMETHASONE DIPROPIONATE HFA 80 UG/1
2 AEROSOL, METERED RESPIRATORY (INHALATION) 2 TIMES DAILY
Qty: 33 G | Refills: 3 | Status: SHIPPED | OUTPATIENT
Start: 2023-10-20

## 2023-10-20 NOTE — TELEPHONE ENCOUNTER
Meaghan Craig at [address] [place] [address] called and needs directions on Qvar prescription, can be reached at [phone]

## 2023-10-21 ENCOUNTER — HOSPITAL ENCOUNTER (EMERGENCY)
Facility: HOSPITAL | Age: 88
Discharge: HOME | End: 2023-10-21
Attending: EMERGENCY MEDICINE
Payer: MEDICARE

## 2023-10-21 ENCOUNTER — APPOINTMENT (EMERGENCY)
Dept: RADIOLOGY | Facility: HOSPITAL | Age: 88
End: 2023-10-21
Payer: MEDICARE

## 2023-10-21 VITALS
BODY MASS INDEX: 25.25 KG/M2 | WEIGHT: 125 LBS | RESPIRATION RATE: 16 BRPM | TEMPERATURE: 97.6 F | SYSTOLIC BLOOD PRESSURE: 145 MMHG | OXYGEN SATURATION: 94 % | DIASTOLIC BLOOD PRESSURE: 63 MMHG | HEART RATE: 53 BPM

## 2023-10-21 DIAGNOSIS — D32.9 MENINGIOMA (HCC): ICD-10-CM

## 2023-10-21 DIAGNOSIS — S51.819A SKIN TEAR OF FOREARM WITHOUT COMPLICATION: ICD-10-CM

## 2023-10-21 DIAGNOSIS — S09.90XA CLOSED HEAD INJURY, INITIAL ENCOUNTER: Primary | ICD-10-CM

## 2023-10-21 DIAGNOSIS — R79.89 ELEVATED TROPONIN: ICD-10-CM

## 2023-10-21 DIAGNOSIS — R00.1 BRADYCARDIA: ICD-10-CM

## 2023-10-21 DIAGNOSIS — E87.1 HYPONATREMIA: ICD-10-CM

## 2023-10-21 DIAGNOSIS — E83.42 HYPOMAGNESEMIA: ICD-10-CM

## 2023-10-21 LAB
2HR DELTA HS TROPONIN: 4 NG/L
ALBUMIN SERPL BCP-MCNC: 3.9 G/DL (ref 3.5–5)
ALP SERPL-CCNC: 74 U/L (ref 34–104)
ALT SERPL W P-5'-P-CCNC: 25 U/L (ref 7–52)
ANION GAP SERPL CALCULATED.3IONS-SCNC: 10 MMOL/L
AST SERPL W P-5'-P-CCNC: 27 U/L (ref 13–39)
BASOPHILS # BLD AUTO: 0.07 THOUSANDS/ÂΜL (ref 0–0.1)
BASOPHILS NFR BLD AUTO: 1 % (ref 0–1)
BILIRUB SERPL-MCNC: 0.44 MG/DL (ref 0.2–1)
BUN SERPL-MCNC: 18 MG/DL (ref 5–25)
CALCIUM SERPL-MCNC: 9.2 MG/DL (ref 8.4–10.2)
CARDIAC TROPONIN I PNL SERPL HS: 14 NG/L
CARDIAC TROPONIN I PNL SERPL HS: 18 NG/L
CHLORIDE SERPL-SCNC: 89 MMOL/L (ref 96–108)
CO2 SERPL-SCNC: 22 MMOL/L (ref 21–32)
CREAT SERPL-MCNC: 1.05 MG/DL (ref 0.6–1.3)
CRP SERPL QL: 8.6 MG/L
EOSINOPHIL # BLD AUTO: 0.23 THOUSAND/ÂΜL (ref 0–0.61)
EOSINOPHIL NFR BLD AUTO: 2 % (ref 0–6)
ERYTHROCYTE [DISTWIDTH] IN BLOOD BY AUTOMATED COUNT: 13.8 % (ref 11.6–15.1)
GFR SERPL CREATININE-BSD FRML MDRD: 46 ML/MIN/1.73SQ M
GLUCOSE SERPL-MCNC: 140 MG/DL (ref 65–140)
HCT VFR BLD AUTO: 37.2 % (ref 34.8–46.1)
HGB BLD-MCNC: 13.1 G/DL (ref 11.5–15.4)
IMM GRANULOCYTES # BLD AUTO: 0.19 THOUSAND/UL (ref 0–0.2)
IMM GRANULOCYTES NFR BLD AUTO: 2 % (ref 0–2)
LYMPHOCYTES # BLD AUTO: 1.27 THOUSANDS/ÂΜL (ref 0.6–4.47)
LYMPHOCYTES NFR BLD AUTO: 10 % (ref 14–44)
MAGNESIUM SERPL-MCNC: 1.4 MG/DL (ref 1.9–2.7)
MCH RBC QN AUTO: 31.6 PG (ref 26.8–34.3)
MCHC RBC AUTO-ENTMCNC: 35.2 G/DL (ref 31.4–37.4)
MCV RBC AUTO: 90 FL (ref 82–98)
MONOCYTES # BLD AUTO: 0.94 THOUSAND/ÂΜL (ref 0.17–1.22)
MONOCYTES NFR BLD AUTO: 8 % (ref 4–12)
NEUTROPHILS # BLD AUTO: 9.56 THOUSANDS/ÂΜL (ref 1.85–7.62)
NEUTS SEG NFR BLD AUTO: 77 % (ref 43–75)
NRBC BLD AUTO-RTO: 0 /100 WBCS
PLATELET # BLD AUTO: 313 THOUSANDS/UL (ref 149–390)
PMV BLD AUTO: 9.7 FL (ref 8.9–12.7)
POTASSIUM SERPL-SCNC: 4.3 MMOL/L (ref 3.5–5.3)
PROCALCITONIN SERPL-MCNC: 0.06 NG/ML
PROT SERPL-MCNC: 6 G/DL (ref 6.4–8.4)
RBC # BLD AUTO: 4.14 MILLION/UL (ref 3.81–5.12)
SODIUM SERPL-SCNC: 121 MMOL/L (ref 135–147)
WBC # BLD AUTO: 12.26 THOUSAND/UL (ref 4.31–10.16)

## 2023-10-21 PROCEDURE — 85025 COMPLETE CBC W/AUTO DIFF WBC: CPT | Performed by: EMERGENCY MEDICINE

## 2023-10-21 PROCEDURE — 36415 COLL VENOUS BLD VENIPUNCTURE: CPT | Performed by: EMERGENCY MEDICINE

## 2023-10-21 PROCEDURE — 90471 IMMUNIZATION ADMIN: CPT

## 2023-10-21 PROCEDURE — 90715 TDAP VACCINE 7 YRS/> IM: CPT | Performed by: EMERGENCY MEDICINE

## 2023-10-21 PROCEDURE — 84145 PROCALCITONIN (PCT): CPT | Performed by: EMERGENCY MEDICINE

## 2023-10-21 PROCEDURE — 83735 ASSAY OF MAGNESIUM: CPT | Performed by: EMERGENCY MEDICINE

## 2023-10-21 PROCEDURE — 86140 C-REACTIVE PROTEIN: CPT | Performed by: EMERGENCY MEDICINE

## 2023-10-21 PROCEDURE — 99291 CRITICAL CARE FIRST HOUR: CPT | Performed by: EMERGENCY MEDICINE

## 2023-10-21 PROCEDURE — 93005 ELECTROCARDIOGRAM TRACING: CPT

## 2023-10-21 PROCEDURE — G0168 WOUND CLOSURE BY ADHESIVE: HCPCS | Performed by: EMERGENCY MEDICINE

## 2023-10-21 PROCEDURE — 84484 ASSAY OF TROPONIN QUANT: CPT | Performed by: EMERGENCY MEDICINE

## 2023-10-21 PROCEDURE — 70486 CT MAXILLOFACIAL W/O DYE: CPT

## 2023-10-21 PROCEDURE — 99285 EMERGENCY DEPT VISIT HI MDM: CPT

## 2023-10-21 PROCEDURE — 71045 X-RAY EXAM CHEST 1 VIEW: CPT

## 2023-10-21 PROCEDURE — G1004 CDSM NDSC: HCPCS

## 2023-10-21 PROCEDURE — 70450 CT HEAD/BRAIN W/O DYE: CPT

## 2023-10-21 PROCEDURE — 80053 COMPREHEN METABOLIC PANEL: CPT | Performed by: EMERGENCY MEDICINE

## 2023-10-21 PROCEDURE — 96365 THER/PROPH/DIAG IV INF INIT: CPT

## 2023-10-21 PROCEDURE — 96361 HYDRATE IV INFUSION ADD-ON: CPT

## 2023-10-21 PROCEDURE — 72125 CT NECK SPINE W/O DYE: CPT

## 2023-10-21 RX ORDER — MAGNESIUM SULFATE HEPTAHYDRATE 40 MG/ML
2 INJECTION, SOLUTION INTRAVENOUS ONCE
Status: COMPLETED | OUTPATIENT
Start: 2023-10-21 | End: 2023-10-21

## 2023-10-21 RX ADMIN — SODIUM CHLORIDE 1000 ML: 0.9 INJECTION, SOLUTION INTRAVENOUS at 14:22

## 2023-10-21 RX ADMIN — MAGNESIUM SULFATE HEPTAHYDRATE 2 G: 40 INJECTION, SOLUTION INTRAVENOUS at 15:23

## 2023-10-21 RX ADMIN — TETANUS TOXOID, REDUCED DIPHTHERIA TOXOID AND ACELLULAR PERTUSSIS VACCINE, ADSORBED 0.5 ML: 5; 2.5; 8; 8; 2.5 SUSPENSION INTRAMUSCULAR at 14:33

## 2023-10-21 NOTE — ED NOTES
Elbow laceration glued by Dr. Whitney Fair and patient is instructed not to wet it for a few days for it to adhere.      Swapnil Garcia RN  10/21/23 [id number]

## 2023-10-21 NOTE — ED PROVIDER NOTES
History  Chief Complaint   Patient presents with    Fall     Tripped on a step and fell onto the carpet. Skin tear to right forearm. Swelling and bruising to right side of face. Denies LOC , neck or back pain     Patient is a 58-year-old female with a history of hypertension, CKD, gout, hyperlipidemia that presents emergency department after a mechanical fall. Patient states that she was attempting to go up some steps, lost her balance and fell, hitting the right side of her face. Patient also scraped her right arm. She is not up-to-date with her tetanus booster. During my initial evaluation, patient was noted to be bradycardic to the 40s. She states that her primary care physician has been attempting to titrate her medications. She also states that she recently had some flulike symptoms, vomited, has had diarrhea, and has not been eating as well. She complains of generalized weakness. History provided by:  Patient   used: No        Prior to Admission Medications   Prescriptions Last Dose Informant Patient Reported? Taking? albuterol (ProAir HFA) 90 mcg/act inhaler   No No   Sig: Inhale 2 puffs every 6 (six) hours as needed for wheezing   allopurinol (ZYLOPRIM) 100 mg tablet   No No   Sig: Take 2 tablets (200 mg total) by mouth daily   amLODIPine (NORVASC) 10 mg tablet   No No   Sig: Take 1 tablet (10 mg total) by mouth daily   beclomethasone (Qvar RediHaler) 80 MCG/ACT inhaler   No No   Sig: Inhale 2 puffs 2 (two) times a day Rinse mouth after use.    colchicine (COLCRYS) 0.6 mg tablet   No No   Sig: Take 1 tablet (0.6 mg total) by mouth daily for 3 days   Patient not taking: Reported on 7/21/2023   cycloSPORINE (RESTASIS) 0.05 % ophthalmic emulsion  Self Yes No   Sig: Administer 1 drop to both eyes 2 (two) times a day   famotidine (PEPCID) 40 MG tablet   No No   Sig: Take 1 tablet by mouth once daily   fluticasone (FLONASE) 50 mcg/act nasal spray   Yes No   levothyroxine 50 mcg tablet   No No   Sig: Take 1 tablet by mouth once daily   metoprolol succinate (TOPROL-XL) 25 mg 24 hr tablet   No No   Sig: Take 1 tablet (25 mg total) by mouth daily at bedtime   rosuvastatin (CRESTOR) 5 mg tablet   No No   Sig: Take 1 tablet by mouth once daily   spironolactone (ALDACTONE) 25 mg tablet   No No   Sig: Take 1 tablet (25 mg total) by mouth every other day   telmisartan (MICARDIS) 80 MG tablet   No No   Sig: Take 1 tablet (80 mg total) by mouth daily      Facility-Administered Medications: None       Past Medical History:   Diagnosis Date    Anesthesia     groggy, difficult to awaken    Asthma     Chronic kidney disease     stage 2 mild    Chronic pain disorder     lumbar herniated discs    Dry eyes     Hyperlipidemia     Hypertension        Past Surgical History:   Procedure Laterality Date    APPENDECTOMY      CATARACT EXTRACTION Bilateral     COLONOSCOPY N/A 10/25/2017    Procedure: COLONOSCOPY;  Surgeon: Wiley Lozano MD;  Location: [address] [place] [address] [facility]; Service: Gastroenterology    EPIDURAL BLOCK INJECTION Right 12/14/2022    Procedure: L4 L5 S1  TRANSFORAMINAL epidural steroid injection ([id number]);   Surgeon: Misti Kelley DO;  Location: [facility] MAIN OR;  Service: Pain Management     JOINT REPLACEMENT Bilateral     partial knee    NE EXC B9 LESION MRGN XCP SK TG T/A/L >4.0 CM Left 6/11/2021    Procedure: EXCISION  BIOPSY LESION/MASS BACK, LEFT BACK, LEFT CHEST WALL;  Surgeon: Nuris Koroma MD;  Location: [facility];  Service: General       Family History   Problem Relation Age of Onset    Heart disease Father     Aneurysm Sister     Heart disease Brother     Cancer Daughter         kidney     Heart disease Brother     Heart disease Brother     Heart disease Sister         MI    Kidney disease Sister     Heart disease Daughter     No Known Problems Maternal Aunt     No Known Problems Maternal Aunt     No Known Problems Maternal Aunt     No Known Problems Paternal Aunt     No Known Problems Paternal Aunt     Breast cancer Sister 80     I have reviewed and agree with the history as documented. E-Cigarette/Vaping    E-Cigarette Use Never User      E-Cigarette/Vaping Substances    Nicotine No     THC No     CBD No     Flavoring No     Other No     Unknown No      Social History     Tobacco Use    Smoking status: Never    Smokeless tobacco: Never   Vaping Use    Vaping Use: Never used   Substance Use Topics    Alcohol use: Not Currently    Drug use: No       Review of Systems   Constitutional:  Negative for chills and fever. Respiratory:  Negative for cough, chest tightness and shortness of breath. Gastrointestinal:  Positive for diarrhea and vomiting. Negative for abdominal pain and nausea. Genitourinary:  Negative for dysuria, frequency, hematuria and urgency. Musculoskeletal:  Negative for back pain, neck pain and neck stiffness. Skin:  Positive for wound. Neurological:  Positive for weakness. All other systems reviewed and are negative. Physical Exam  Physical Exam  Vitals and nursing note reviewed. Constitutional:       General: She is not in acute distress. Appearance: Normal appearance. She is well-developed. She is not diaphoretic. HENT:      Head: Normocephalic. Right Ear: Tympanic membrane normal.      Left Ear: Tympanic membrane normal.   Eyes:      Conjunctiva/sclera: Conjunctivae normal.      Pupils: Pupils are equal, round, and reactive to light. Cardiovascular:      Rate and Rhythm: Normal rate and regular rhythm. Heart sounds: Normal heart sounds. No murmur heard. Pulmonary:      Effort: Pulmonary effort is normal. No respiratory distress. Breath sounds: Normal breath sounds. Abdominal:      General: Bowel sounds are normal. There is no distension. Palpations: Abdomen is soft. Tenderness: There is no abdominal tenderness. Musculoskeletal:         General: Signs of injury present. No tenderness or deformity.  Normal range of motion. Right forearm: Laceration present. No deformity or tenderness. Left forearm: Normal.        Arms:       Cervical back: No tenderness. Skin:     General: Skin is warm and dry. Capillary Refill: Capillary refill takes less than 2 seconds. Coloration: Skin is not pale. Findings: No rash. Neurological:      General: No focal deficit present. Mental Status: She is alert and oriented to person, place, and time. Cranial Nerves: No cranial nerve deficit.    Psychiatric:         Behavior: Behavior normal.         Vital Signs  ED Triage Vitals   Temperature Pulse Respirations Blood Pressure SpO2   10/21/23 1338 10/21/23 1338 10/21/23 1338 10/21/23 1340 10/21/23 1338   97.9 °F (36.6 °C) 56 16 (!) 171/73 95 %      Temp src Heart Rate Source Patient Position - Orthostatic VS BP Location FiO2 (%)   -- -- 10/21/23 1558 10/21/23 1558 --     Lying Left arm       Pain Score       10/21/23 1338       4           Vitals:    10/21/23 1338 10/21/23 1340 10/21/23 1558   BP:  (!) 171/73 145/63   Pulse: 56  (!) 53   Patient Position - Orthostatic VS:   Lying         Visual Acuity  Visual Acuity      Flowsheet Row Most Recent Value   L Pupil Size (mm) 3   R Pupil Size (mm) 3            ED Medications  Medications   tetanus-diphtheria-acellular pertussis (BOOSTRIX) IM injection 0.5 mL (0.5 mL Intramuscular Given 10/21/23 1433)   sodium chloride 0.9 % bolus 1,000 mL (0 mL Intravenous Stopped 10/21/23 1648)   magnesium sulfate 2 g/50 mL IVPB (premix) 2 g (0 g Intravenous Stopped 10/21/23 1616)       Diagnostic Studies  Results Reviewed       Procedure Component Value Units Date/Time    HS Troponin I 2hr [[id number]]  (Normal) Collected: 10/21/23 1616    Lab Status: Final result Specimen: Blood from Arm, Left Updated: 10/21/23 1644     hs TnI 2hr 18 ng/L      Delta 2hr hsTnI 4 ng/L     Procalcitonin [[id number]]  (Normal) Collected: 10/21/23 1420    Lab Status: Final result Specimen: Blood from Arm, Left Updated: 10/21/23 1548     Procalcitonin 0.06 ng/ml     C-reactive protein [[id number]]  (Abnormal) Collected: 10/21/23 1420    Lab Status: Final result Specimen: Blood from Arm, Left Updated: 10/21/23 1537     CRP 8.6 mg/L     HS Troponin 0hr (reflex protocol) [[id number]]  (Normal) Collected: 10/21/23 1420    Lab Status: Final result Specimen: Blood from Arm, Left Updated: 10/21/23 1457     hs TnI 0hr 14 ng/L     Comprehensive metabolic panel [[id number]]  (Abnormal) Collected: 10/21/23 1420    Lab Status: Final result Specimen: Blood from Arm, Left Updated: 10/21/23 1453     Sodium 121 mmol/L      Potassium 4.3 mmol/L      Chloride 89 mmol/L      CO2 22 mmol/L      ANION GAP 10 mmol/L      BUN 18 mg/dL      Creatinine 1.05 mg/dL      Glucose 140 mg/dL      Calcium 9.2 mg/dL      AST 27 U/L      ALT 25 U/L      Alkaline Phosphatase 74 U/L      Total Protein 6.0 g/dL      Albumin 3.9 g/dL      Total Bilirubin 0.44 mg/dL      eGFR 46 ml/min/1.73sq m     Narrative:      [facility] guidelines for Chronic Kid[facility] Disease (CKD):     Stage 1 with normal or high GFR (GFR > 90 mL/min/1.73 square meters)    Stage 2 Mild CKD (GFR = 60-89 mL/min/1.73 square meters)    Stage 3A Moderate CKD (GFR = 45-59 mL/min/1.73 square meters)    Stage 3B Moderate CKD (GFR = 30-44 mL/min/1.73 square meters)    Stage 4 Severe CKD (GFR = 15-29 mL/min/1.73 square meters)    Stage 5 End Stage CKD (GFR <15 mL/min/1.73 square meters)  Note: GFR calculation is accurate only with a steady state creatinine    Magnesium [[id number]]  (Abnormal) Collected: 10/21/23 1420    Lab Status: Final result Specimen: Blood from Arm, Left Updated: 10/21/23 1453     Magnesium 1.4 mg/dL     CBC and differential [[id number]]  (Abnormal) Collected: 10/21/23 1420    Lab Status: Final result Specimen: Blood from Arm, Left Updated: 10/21/23 1430     WBC 12.26 Thousand/uL      RBC 4.14 Million/uL      Hemoglobin 13.1 g/dL      Hematocrit 37.2 % MCV 90 fL      MCH 31.6 pg      MCHC 35.2 g/dL      RDW 13.8 %      MPV 9.7 fL      Platelets 400 Thousands/uL      nRBC 0 /100 WBCs      Neutrophils Relative 77 %      Immat GRANS % 2 %      Lymphocytes Relative 10 %      Monocytes Relative 8 %      Eosinophils Relative 2 %      Basophils Relative 1 %      Neutrophils Absolute 9.56 Thousands/µL      Immature Grans Absolute 0.19 Thousand/uL      Lymphocytes Absolute 1.27 Thousands/µL      Monocytes Absolute 0.94 Thousand/µL      Eosinophils Absolute 0.23 Thousand/µL      Basophils Absolute 0.07 Thousands/µL                    XR chest 1 view portable   ED Interpretation by Obdulia Lazcano DO (10/21 1534)   Nad        CT head without contrast   Final Result by Viky Merritt MD (10/21 1623)      1. No acute intracranial CT abnormality. 2.  Incidental 0.7 cm partially calcified extra-axial lesion in the anterior left middle cranial fossa most likely representing an meningioma. Workstation performed: [id number]         CT facial bones without contrast   Final Result by Viky Merritt MD (10/21 1620)      No acute osseous abnormality. Workstation performed: [id number]         CT cervical spine without contrast   Final Result by Viky Merritt MD (10/21 1622)      No acute cervical spine fracture or traumatic malalignment. Workstation performed: [id number]                    Procedures  Universal Protocol:  Consent: Verbal consent obtained. Risks and benefits: risks, benefits and alternatives were discussed  Consent given by: patient  Time out: Immediately prior to procedure a "time out" was called to verify the correct patient, procedure, equipment, support staff and site/side marked as required.   Timeout called at: 10/21/2023 4:30 PM.  Patient understanding: patient states understanding of the procedure being performed  Patient consent: the patient's understanding of the procedure matches consent given  Site marked: the operative site was marked  Required items: required blood products, implants, devices, and special equipment available  Patient identity confirmed: verbally with patient  Laceration repair    Date/Time: 10/21/2023 4:30 PM    Performed by: Dian Leach DO  Authorized by: Dian Leach DO  Body area: upper extremity  Location details: right lower arm  Laceration length: 8 cm  Foreign bodies: no foreign bodies  Tendon involvement: none  Nerve involvement: none  Vascular damage: no    Sedation:  Patient sedated: no      Wound Dehiscence:  Superficial Wound Dehiscence: simple closure      Procedure Details:  Preparation: Patient was prepped and draped in the usual sterile fashion.   Irrigation solution: saline  Irrigation method: syringe  Amount of cleaning: standard  Debridement: none  Degree of undermining: none  Skin closure: glue  Approximation: close  Approximation difficulty: simple  Patient tolerance: patient tolerated the procedure well with no immediate complications      ECG 12 Lead Documentation Only    Date/Time: 10/21/2023 2:00 PM    Performed by: Dian Leach DO  Authorized by: Dian Leach DO    Indications / Diagnosis:  Weakness  ECG reviewed by me, the ED Provider: yes    Patient location:  ED  Previous ECG:     Previous ECG:  Unavailable    Comparison to cardiac monitor: Yes    Interpretation:     Interpretation: abnormal    Rate:     ECG rate assessment: bradycardic    Rhythm:     Rhythm: sinus bradycardia    Ectopy:     Ectopy: none    QRS:     QRS axis:  Normal  Conduction:     Conduction: abnormal      Abnormal conduction: 1st degree    ST segments:     ST segments:  Normal  T waves:     T waves: normal    CriticalCare Time    Date/Time: 10/21/2023 4:00 PM    Performed by: Dian Leach DO  Authorized by: Dian Leach DO    Critical care provider statement:     Critical care time (minutes):  35    Critical care time was exclusive of:  Separately billable procedures and treating other patients and teaching time    Critical care was necessary to treat or prevent imminent or life-threatening deterioration of the following conditions:  Dehydration    Critical care was time spent personally by me on the following activities:  Blood draw for specimens, obtaining history from patient or surrogate, development of treatment plan with patient or surrogate, evaluation of patient's response to treatment, examination of patient, interpretation of cardiac output measurements, ordering and performing treatments and interventions, ordering and review of laboratory studies, ordering and review of radiographic studies, re-evaluation of patient's condition and review of old charts    I assumed direction of critical care for this patient from another provider in my specialty: no             ED Course  ED Course as of 10/21/23 2154   Sat Oct 21, 2023   1705 I reviewed patient's labs and I offered her an admission, however patient refuses admission. She states that she has a small dog at home that requires care. I spoke to patient about concern for seizures given hyponatremia. Patient states she will call her primary care physician on Monday morning to review her test results. SBIRT 22yo+      Flowsheet Row Most Recent Value   Initial Alcohol Screen: US AUDIT-C     1. How often do you have a drink containing alcohol? 0 Filed at: 10/21/2023 1341   2. How many drinks containing alcohol do you have on a typical day you are drinking? 0 Filed at: 10/21/2023 1341   3a. Male UNDER 65: How often do you have five or more drinks on one occasion? 0 Filed at: 10/21/2023 1341   3b. FEMALE Any Age, or MALE 65+: How often do you have 4 or more drinks on one occassion? 0 Filed at: 10/21/2023 1341   Audit-C Score 0 Filed at: 10/21/2023 1341   RONNI: How many times in the past year have you. ..     Used an illegal drug or used a prescription medication for non-medical reasons? Never Filed at: 10/21/2023 1341                      Medical Decision Making  57-year-old female presents after a fall. Labs, CTs head, C-spine, facial bones ordered and reviewed. Patient noted to be severely hyponatremic, hypomagnesemic with an elevated troponin. I recommended admission, however patient declines because she has to go home to care for drug. Patient will be discharged [facility]. She states she will call her primary care physician on Monday to schedule a visit. Amount and/or Complexity of Data Reviewed  Labs: ordered. Radiology: ordered and independent interpretation performed. Risk  Prescription drug management.              Disposition  Final diagnoses:   Closed head injury, initial encounter   Hyponatremia   Hypomagnesemia   Elevated troponin   Skin tear of forearm without complication   Meningioma (HCC)   Bradycardia     Time reflects when diagnosis was documented in both MDM as applicable and the Disposition within this note       Time User Action Codes Description Comment    10/21/2023  4:48 PM Susan Woodruff O Add [S09.90XA] Closed head injury, initial encounter     10/21/2023  4:48 PM Susan Woodruff O Add [E87.1] Hyponatremia     10/21/2023  4:48 PM Susan Woodruff Add [E83.42] Hypomagnesemia     10/21/2023  4:49 PM Susan Woodruff Add [R79.89] Elevated troponin     10/21/2023  4:50 PM Susan Woodruff Add [S51.819A] Skin tear of forearm without complication     25/44/6745  4:52 PM Susan Woodruff Add [D32.9] Meningioma ([address])     10/21/2023  4:52 PM Susan Woodruff Add [R00.1] Bradycardia           ED Disposition       ED Disposition   AMA    Condition   --    Date/Time   Sat Oct 21, 2023 1648    Comment   Date: 10/21/2023  Patient: Uyen Lutz  Admitted: 10/21/2023  1:34 PM  Attending Provider: Amanda Luong DO    Uyen Lutz or her authorized caregiver has made the decision for the patient to leave the emergency department against  the advice of the emergency department staff. She or her authorized caregiver has been informed and understands the inherent risks, including death, decompensation, disability. She or her authorized caregiver has decided to accept the [address] y for this decision. Berry Puentes and all necessary parties have been advised that she may return for further evaluation or treatment. Her condition at time of discharge was unchanged.   Berry Puentes had current vital signs as follows:  BP 1 45/63 (BP Location: Left arm)   Pulse (!) 53   Temp 97.6 °F (36.4 °C)   Resp 16   Wt 56.7 kg (125 lb)                Follow-up Information       Follow up With Specialties Details Why Contact Info    Carmenza Trammell MD Family Medicine Schedule an appointment as soon as possible for a visit in 1 day for follow up [address] [facility] [address]  [phone]              Discharge Medication List as of 10/21/2023  4:56 PM        CONTINUE these medications which have NOT CHANGED    Details   albuterol (ProAir HFA) 90 mcg/act inhaler Inhale 2 puffs every 6 (six) hours as needed for wheezing, Starting Mon 3/13/2023, Normal      allopurinol (ZYLOPRIM) 100 mg tablet Take 2 tablets (200 mg total) by mouth daily, Starting Fri 7/21/2023, Normal      amLODIPine (NORVASC) 10 mg tablet Take 1 tablet (10 mg total) by mouth daily, Starting Fri 7/21/2023, Normal      beclomethasone (Qvar RediHaler) 80 MCG/ACT inhaler Inhale 2 puffs 2 (two) times a day Rinse mouth after use., Starting Fri 10/20/2023, Normal      colchicine (COLCRYS) 0.6 mg tablet Take 1 tablet (0.6 mg total) by mouth daily for 3 days, Starting Tue 7/18/2023, Until Fri 7/21/2023, Normal      cycloSPORINE (RESTASIS) 0.05 % ophthalmic emulsion Administer 1 drop to both eyes 2 (two) times a day, Historical Med      famotidine (PEPCID) 40 MG tablet Take 1 tablet by mouth once daily, Normal      fluticasone (FLONASE) 50 mcg/act nasal spray Historical Med      levothyroxine 50 mcg tablet Take 1 tablet by mouth once daily, Normal      metoprolol succinate (TOPROL-XL) 25 mg 24 hr tablet Take 1 tablet (25 mg total) by mouth daily at bedtime, Starting Wed 10/18/2023, Until Mon 4/15/2024, Normal      rosuvastatin (CRESTOR) 5 mg tablet Take 1 tablet by mouth once daily, Normal      spironolactone (ALDACTONE) 25 mg tablet Take 1 tablet (25 mg total) by mouth every other day, Starting Mon 8/7/2023, Normal      telmisartan (MICARDIS) 80 MG tablet Take 1 tablet (80 mg total) by mouth daily, Starting Fri 7/21/2023, Normal             No discharge procedures on file.     PDMP Review       None            ED Provider  Electronically Signed by             Juliann Gray DO  10/21/23 8637

## 2023-10-21 NOTE — DISCHARGE INSTRUCTIONS
You have chosen to leave [address] [place] [address], despite the abnormal lab findings noted above including hyponatremia, hypomagnesemia, bradycardia, elevated troponin which may cause you severe illness  You may return to emergency department at any time for admission as recommended on this visit  Return to the ER for further concerns or worsening symptoms  Follow up with your primary care physician in 1-2 days

## 2023-10-21 NOTE — ED NOTES
Patient Left Against Medical Advice. And was told about the repercussions.  By Dr. Joey Dunn RN  10/21/23 7894

## 2023-10-22 LAB
ATRIAL RATE: 55 BPM
P AXIS: -2 DEGREES
PR INTERVAL: 234 MS
QRS AXIS: -9 DEGREES
QRSD INTERVAL: 98 MS
QT INTERVAL: 428 MS
QTC INTERVAL: 409 MS
T WAVE AXIS: 32 DEGREES
VENTRICULAR RATE: 55 BPM

## 2023-10-22 PROCEDURE — 93010 ELECTROCARDIOGRAM REPORT: CPT | Performed by: INTERNAL MEDICINE

## 2023-10-24 ENCOUNTER — OFFICE VISIT (OUTPATIENT)
Dept: FAMILY MEDICINE CLINIC | Facility: CLINIC | Age: 88
End: 2023-10-24
Payer: MEDICARE

## 2023-10-24 VITALS
TEMPERATURE: 97.3 F | BODY MASS INDEX: 25.4 KG/M2 | WEIGHT: 126 LBS | OXYGEN SATURATION: 97 % | SYSTOLIC BLOOD PRESSURE: 138 MMHG | HEART RATE: 49 BPM | DIASTOLIC BLOOD PRESSURE: 54 MMHG | RESPIRATION RATE: 16 BRPM | HEIGHT: 59 IN

## 2023-10-24 DIAGNOSIS — E87.1 HYPONATREMIA: ICD-10-CM

## 2023-10-24 DIAGNOSIS — S50.811A ABRASION OF RIGHT FOREARM, INITIAL ENCOUNTER: Primary | ICD-10-CM

## 2023-10-24 DIAGNOSIS — S00.83XA CONTUSION OF FACE, INITIAL ENCOUNTER: ICD-10-CM

## 2023-10-24 PROCEDURE — 99213 OFFICE O/P EST LOW 20 MIN: CPT | Performed by: FAMILY MEDICINE

## 2023-10-24 NOTE — PROGRESS NOTES
Chief Complaint   Patient presents with   • Fall     Patient fell Saturday going up a step left side -face , arm shoulder  are bruised, patient wants to discuss low sodium in her labs         Patient ID: Belkys Arnold is a 80 y.o. female. HPI  Pt is seeing for f/u recent ER visit for R side facial bruise and R forearm abrasion after the fall at home -  no syncope, was feeling fatigued from recent URI  -  was given IV fluids -  Na level was low  -  pt did not want to stay overnight -  feeling Ok except for bruises     The following portions of the patient's history were reviewed and updated as appropriate: allergies, current medications, past family history, past medical history, past social history, past surgical history and problem list.    Review of Systems   Eyes:  Negative for visual disturbance. Neurological:  Negative for dizziness, facial asymmetry, light-headedness and headaches. Psychiatric/Behavioral: Negative. All other systems reviewed and are negative. Current Outpatient Medications   Medication Sig Dispense Refill   • albuterol (ProAir HFA) 90 mcg/act inhaler Inhale 2 puffs every 6 (six) hours as needed for wheezing 18 g 1   • allopurinol (ZYLOPRIM) 100 mg tablet Take 2 tablets (200 mg total) by mouth daily 180 tablet 3   • amLODIPine (NORVASC) 10 mg tablet Take 1 tablet (10 mg total) by mouth daily 90 tablet 1   • beclomethasone (Qvar RediHaler) 80 MCG/ACT inhaler Inhale 2 puffs 2 (two) times a day Rinse mouth after use.  33 g 3   • cycloSPORINE (RESTASIS) 0.05 % ophthalmic emulsion Administer 1 drop to both eyes 2 (two) times a day     • fluticasone (FLONASE) 50 mcg/act nasal spray      • levothyroxine 50 mcg tablet Take 1 tablet by mouth once daily 90 tablet 0   • metoprolol succinate (TOPROL-XL) 25 mg 24 hr tablet Take 1 tablet (25 mg total) by mouth daily at bedtime 90 tablet 1   • rosuvastatin (CRESTOR) 5 mg tablet Take 1 tablet by mouth once daily 90 tablet 1   • spironolactone (ALDACTONE) 25 mg tablet Take 1 tablet (25 mg total) by mouth every other day 45 tablet 0   • telmisartan (MICARDIS) 80 MG tablet Take 1 tablet (80 mg total) by mouth daily 90 tablet 1   • colchicine (COLCRYS) 0.6 mg tablet Take 1 tablet (0.6 mg total) by mouth daily for 3 days (Patient not taking: Reported on 7/21/2023) 3 tablet 0   • famotidine (PEPCID) 40 MG tablet Take 1 tablet by mouth once daily (Patient not taking: Reported on 10/24/2023) 30 tablet 0     No current facility-administered medications for this visit. Objective:    /54 (BP Location: Left arm, Patient Position: Sitting, Cuff Size: Standard)   Pulse (!) 49   Temp (!) 97.3 °F (36.3 °C)   Resp 16   Ht 4' 11" (1.499 m)   Wt 57.2 kg (126 lb)   SpO2 97%   BMI 25.45 kg/m²        Physical Exam  Constitutional:       Appearance: She is not ill-appearing. Cardiovascular:      Rate and Rhythm: Regular rhythm. Bradycardia present. Pulmonary:      Effort: Pulmonary effort is normal. No respiratory distress. Skin:     Findings: Ecchymosis (R side face and R forearm) present. Neurological:      General: No focal deficit present. Mental Status: She is alert and oriented to person, place, and time. Motor: No weakness. Gait: Gait normal.           Labs in chart were reviewed. Assessment/Plan:         Diagnoses and all orders for this visit:    Abrasion of right forearm, initial encounter  -     mupirocin (BACTROBAN) 2 % ointment; Apply topically 2 (two) times a day    Contusion of face, initial encounter    Hyponatremia  -     Basic metabolic panel;  Future        Rto prn                     Adelaida Hudson MD

## 2023-10-25 ENCOUNTER — APPOINTMENT (OUTPATIENT)
Dept: LAB | Facility: CLINIC | Age: 88
End: 2023-10-25
Payer: MEDICARE

## 2023-10-25 DIAGNOSIS — E87.1 HYPONATREMIA: ICD-10-CM

## 2023-10-25 LAB
ANION GAP SERPL CALCULATED.3IONS-SCNC: 9 MMOL/L
BUN SERPL-MCNC: 16 MG/DL (ref 5–25)
CALCIUM SERPL-MCNC: 9.3 MG/DL (ref 8.4–10.2)
CHLORIDE SERPL-SCNC: 98 MMOL/L (ref 96–108)
CO2 SERPL-SCNC: 22 MMOL/L (ref 21–32)
CREAT SERPL-MCNC: 0.91 MG/DL (ref 0.6–1.3)
GFR SERPL CREATININE-BSD FRML MDRD: 54 ML/MIN/1.73SQ M
GLUCOSE SERPL-MCNC: 116 MG/DL (ref 65–140)
POTASSIUM SERPL-SCNC: 4.8 MMOL/L (ref 3.5–5.3)
SODIUM SERPL-SCNC: 129 MMOL/L (ref 135–147)

## 2023-10-25 PROCEDURE — 36415 COLL VENOUS BLD VENIPUNCTURE: CPT

## 2023-10-25 PROCEDURE — 80048 BASIC METABOLIC PNL TOTAL CA: CPT

## 2023-10-26 DIAGNOSIS — E78.2 MIXED HYPERLIPIDEMIA: ICD-10-CM

## 2023-10-26 RX ORDER — ROSUVASTATIN CALCIUM 5 MG/1
TABLET, COATED ORAL
Qty: 90 TABLET | Refills: 0 | Status: SHIPPED | OUTPATIENT
Start: 2023-10-26

## 2023-11-03 ENCOUNTER — OFFICE VISIT (OUTPATIENT)
Age: 88
End: 2023-11-03
Payer: MEDICARE

## 2023-11-03 VITALS
SYSTOLIC BLOOD PRESSURE: 139 MMHG | HEART RATE: 64 BPM | DIASTOLIC BLOOD PRESSURE: 69 MMHG | WEIGHT: 126 LBS | RESPIRATION RATE: 17 BRPM | BODY MASS INDEX: 25.4 KG/M2 | HEIGHT: 59 IN

## 2023-11-03 DIAGNOSIS — M77.42 METATARSALGIA OF BOTH FEET: Primary | ICD-10-CM

## 2023-11-03 DIAGNOSIS — M77.41 METATARSALGIA OF BOTH FEET: Primary | ICD-10-CM

## 2023-11-03 DIAGNOSIS — I70.209 PERIPHERAL ARTERIOSCLEROSIS (HCC): ICD-10-CM

## 2023-11-03 DIAGNOSIS — M54.16 RADICULOPATHY OF LUMBAR REGION: ICD-10-CM

## 2023-11-03 PROCEDURE — 99213 OFFICE O/P EST LOW 20 MIN: CPT | Performed by: PODIATRIST

## 2023-11-03 NOTE — PROGRESS NOTES
Assessment/Plan:  Metatarsalgia secondary to radiculopathy, right greater than left. Pain upon ambulation. Mycosis of nail. Arthralgia. Peripheral artery disease. Plan. Chart reviewed. Patient advised on condition. She will follow-up with pain management. Today all nails debrided without pain or complication. Return for follow-up as needed for injection. Diagnoses and all orders for this visit:     Metatarsalgia of both feet     Radiculopathy of lumbar region     Hallux valgus of right and left foot     Arthralgia of right and left foot, first MPJ     Onychomycosis     Peripheral artery disease. Pain upon ambulation            Subjective:  Patient has pain in her feet. She has pain with ambulation shoe wear. Right greater than left. She is aware she has back problems causing foot problems. She is following up with pain management. She has stopped taking Cymbalta. She also gets pain in her toes ambulation and shoe wear. No history of trauma. She has been following with pain management               Allergies   Allergen Reactions    Atorvastatin         Other reaction(s): Leg Cramps  Reaction Date: [id number];     Azithromycin         Other reaction(s): Unknown Allergic Reaction  Reaction Date: [id number]; Annotation - [id number]: jjw    Codeine Nausea Only       Reaction Date: [id number]; Annotation - [id number]: jjw    Moxifloxacin         Other reaction(s): Diarrhea    Penicillins Rash       Reaction Date: [id number]; Annotation - [id number]: jjw            Current Outpatient Medications:     albuterol (PROAIR HFA) 90 mcg/act inhaler, Inhale 2 puffs every 6 (six) hours as needed for wheezing, Disp: 1 Inhaler, Rfl: 2    amLODIPine (NORVASC) 5 mg tablet, Take 1 tablet by mouth twice daily (Patient taking differently: 5 mg daily), Disp: 60 tablet, Rfl: 6    beclomethasone (Qvar RediHaler) 80 MCG/ACT inhaler, Inhale 2 puffs  in the morning and 2 puffs in the evening.  Rinse mouth after use.., Disp: 10.6 g, Rfl: 6    cycloSPORINE (RESTASIS) 0.05 % ophthalmic emulsion, Administer 1 drop to both eyes 2 (two) times a day, Disp: , Rfl:     DULoxetine (CYMBALTA) 30 mg delayed release capsule, Take 1 capsule (30 mg total) by mouth daily, Disp: 30 capsule, Rfl: 0    famotidine (PEPCID) 40 MG tablet, Take 1 tablet by mouth once daily, Disp: 30 tablet, Rfl: 0    levothyroxine 50 mcg tablet, Take 1 tablet by mouth once daily, Disp: 90 tablet, Rfl: 0    metoprolol succinate (TOPROL-XL) 50 mg 24 hr tablet, TAKE 1 TABLET BY MOUTH ONCE DAILY AT BEDTIME, Disp: 90 tablet, Rfl: 1    rosuvastatin (CRESTOR) 5 mg tablet, Take 1 tablet by mouth once daily, Disp: 30 tablet, Rfl: 6    telmisartan-hydrochlorothiazide (MICARDIS HCT) 80-12.5 MG per tablet, Take 1 tablet by mouth once daily, Disp: 90 tablet, Rfl: 0           Patient Active Problem List   Diagnosis    Asthma    Benign essential hypertension    Chronic kidney disease, stage II (mild)    Mixed hyperlipidemia    Osteoporosis    Other specified hypothyroidism    Abnormal CT of the abdomen    Epigastric pain    Abdominal bloating    Delayed emergence from anesthesia    Back pain    Lipoma of back    Heart murmur             Patient ID: Joelle Crooks is a 80 y.o. female. HPI     The following portions of the patient's history were reviewed and updated as appropriate:      family history includes Aneurysm in her sister; Breast cancer (age of onset: 80) in her sister; Cancer in her daughter; Heart disease in her brother, brother, brother, daughter, father, and sister; Kidney disease in her sister; No Known Problems in her maternal aunt, maternal aunt, maternal aunt, paternal aunt, and paternal aunt. reports that she has never smoked. She has never used smokeless tobacco. She reports previous alcohol use. She reports that she does not use drugs. Objective:  Patient's shoes and socks removed.    Foot ExamPhysical Exam          General  General Appearance: appears stated age and healthy   Orientation: alert and oriented to person, place, and time   Affect: appropriate   Gait: antalgic         Right Foot/Ankle      Inspection and Palpation  Ecchymosis: none  Tenderness: bony tenderness . Pain patient has inflamed bunion. No evidence of a infection or gout  Swelling: dorsum   Arch: pes cavus  Claw Toes: second toe  Hallux valgus: no  Hallux limitus: yes  Skin Exam: callus and dry skin;      Neurovascular  Dorsalis pedis: 3+  Posterior tibial: 3+  Saphenous nerve sensation: normal  Tibial nerve sensation: normal  Superficial peroneal nerve sensation: normal  Deep peroneal nerve sensation: normal  Sural nerve sensation: normal        Left Foot/Ankle       Inspection and Palpation  Ecchymosis: none  Swelling: dorsum   Arch: pes planus  Hallux valgus: no  Hallux limitus: yes  Skin Exam: callus and dry skin;      Neurovascular  Dorsalis pedis: 3+  Posterior tibial: 3+  Saphenous nerve sensation: normal  Tibial nerve sensation: normal  Superficial peroneal nerve sensation: normal  Deep peroneal nerve sensation: normal  Sural nerve sensation: normal           Physical Exam  Vitals signs and nursing note reviewed. Constitutional:       Appearance: Normal appearance. Cardiovascular:      Pulses:           Dorsalis pedis pulses are 3+ on the right side and 3+ on the left side. Posterior tibial pulses are 3+ on the right side and 3+ on the left side. Musculoskeletal:      Right foot: Bony tenderness present. No bunion. Left foot: No bunion. Comments: X-ray of right foot demonstrates no evidence of fracture osteomyelitis. However middle phalanx of 2nd right toe demonstrates cystic change consistent with interosseous tophaceous change. Feet:      Right foot:      Skin integrity: Callus and dry skin present. Left foot:      Skin integrity: Callus and dry skin present. Skin:     General: Skin is warm. Patient has 4/5 L5 testing.

## 2023-11-17 DIAGNOSIS — I10 BENIGN ESSENTIAL HYPERTENSION: ICD-10-CM

## 2023-11-17 RX ORDER — SPIRONOLACTONE 25 MG/1
25 TABLET ORAL EVERY OTHER DAY
Qty: 45 TABLET | Refills: 0 | Status: SHIPPED | OUTPATIENT
Start: 2023-11-17

## 2023-12-13 ENCOUNTER — RA CDI HCC (OUTPATIENT)
Dept: OTHER | Facility: HOSPITAL | Age: 88
End: 2023-12-13

## 2023-12-15 ENCOUNTER — TELEPHONE (OUTPATIENT)
Dept: ADMINISTRATIVE | Facility: OTHER | Age: 88
End: 2023-12-15

## 2023-12-15 NOTE — TELEPHONE ENCOUNTER
12/15/23 3:31 PM    Patient contacted (spoke with patient) to bring Advance Directive, POLST, or Living Will document to next scheduled pcp visit. Thank you.   Joey Maldonado MA  [facility]

## 2023-12-19 ENCOUNTER — OFFICE VISIT (OUTPATIENT)
Dept: FAMILY MEDICINE CLINIC | Facility: CLINIC | Age: 88
End: 2023-12-19
Payer: MEDICARE

## 2023-12-19 VITALS
WEIGHT: 122.6 LBS | TEMPERATURE: 98 F | OXYGEN SATURATION: 94 % | BODY MASS INDEX: 24.72 KG/M2 | HEIGHT: 59 IN | DIASTOLIC BLOOD PRESSURE: 60 MMHG | HEART RATE: 62 BPM | RESPIRATION RATE: 16 BRPM | SYSTOLIC BLOOD PRESSURE: 127 MMHG

## 2023-12-19 DIAGNOSIS — Z23 ENCOUNTER FOR IMMUNIZATION: ICD-10-CM

## 2023-12-19 DIAGNOSIS — Z12.31 ENCOUNTER FOR SCREENING MAMMOGRAM FOR MALIGNANT NEOPLASM OF BREAST: ICD-10-CM

## 2023-12-19 DIAGNOSIS — I10 BENIGN ESSENTIAL HYPERTENSION: Primary | ICD-10-CM

## 2023-12-19 DIAGNOSIS — E87.1 HYPONATREMIA: ICD-10-CM

## 2023-12-19 DIAGNOSIS — R73.01 IMPAIRED FASTING GLUCOSE: ICD-10-CM

## 2023-12-19 DIAGNOSIS — E03.8 OTHER SPECIFIED HYPOTHYROIDISM: ICD-10-CM

## 2023-12-19 PROCEDURE — G0008 ADMIN INFLUENZA VIRUS VAC: HCPCS | Performed by: FAMILY MEDICINE

## 2023-12-19 PROCEDURE — 99214 OFFICE O/P EST MOD 30 MIN: CPT | Performed by: FAMILY MEDICINE

## 2023-12-19 PROCEDURE — 90662 IIV NO PRSV INCREASED AG IM: CPT | Performed by: FAMILY MEDICINE

## 2023-12-19 NOTE — PROGRESS NOTES
Chief Complaint   Patient presents with   • Follow-up     3month follow up bp        Patient ID: Nicki Escobedo is a 89 y.o. female.    HPI  Pt is seeing for f/u HTN, IFG, Hypothyroidism, Hyponatremia -  all stable     The following portions of the patient's history were reviewed and updated as appropriate: allergies, current medications, past family history, past medical history, past social history, past surgical history and problem list.    Review of Systems   Constitutional: Negative.    Respiratory: Negative.     Cardiovascular: Negative.    Gastrointestinal: Negative.    Genitourinary: Negative.    Musculoskeletal: Negative.    Skin: Negative.    Neurological: Negative.    Hematological: Negative.    Psychiatric/Behavioral: Negative.         Current Outpatient Medications   Medication Sig Dispense Refill   • albuterol (ProAir HFA) 90 mcg/act inhaler Inhale 2 puffs every 6 (six) hours as needed for wheezing 18 g 1   • allopurinol (ZYLOPRIM) 100 mg tablet Take 2 tablets (200 mg total) by mouth daily 180 tablet 3   • amLODIPine (NORVASC) 10 mg tablet Take 1 tablet (10 mg total) by mouth daily 90 tablet 1   • beclomethasone (Qvar RediHaler) 80 MCG/ACT inhaler Inhale 2 puffs 2 (two) times a day Rinse mouth after use. 33 g 3   • cycloSPORINE (RESTASIS) 0.05 % ophthalmic emulsion Administer 1 drop to both eyes 2 (two) times a day     • fluticasone (FLONASE) 50 mcg/act nasal spray      • levothyroxine 50 mcg tablet Take 1 tablet by mouth once daily 90 tablet 0   • metoprolol succinate (TOPROL-XL) 25 mg 24 hr tablet Take 1 tablet (25 mg total) by mouth daily at bedtime 90 tablet 1   • mupirocin (BACTROBAN) 2 % ointment Apply topically 2 (two) times a day 22 g 0   • rosuvastatin (CRESTOR) 5 mg tablet Take 1 tablet by mouth once daily 90 tablet 0   • spironolactone (ALDACTONE) 25 mg tablet TAKE 1 TABLET BY MOUTH EVERY OTHER DAY 45 tablet 0   • telmisartan (MICARDIS) 80 MG tablet Take 1 tablet (80 mg total) by mouth  "daily 90 tablet 1   • colchicine (COLCRYS) 0.6 mg tablet Take 1 tablet (0.6 mg total) by mouth daily for 3 days (Patient not taking: Reported on 7/21/2023) 3 tablet 0   • famotidine (PEPCID) 40 MG tablet Take 1 tablet by mouth once daily (Patient not taking: Reported on 10/24/2023) 30 tablet 0     No current facility-administered medications for this visit.       Objective:    /60 Comment: home BP readings  Pulse 62   Temp 98 °F (36.7 °C)   Resp 16   Ht 4' 11\" (1.499 m)   Wt 55.6 kg (122 lb 9.6 oz)   SpO2 94%   BMI 24.76 kg/m²        Physical Exam  Constitutional:       General: She is not in acute distress.     Appearance: She is not ill-appearing.   Cardiovascular:      Rate and Rhythm: Normal rate and regular rhythm.      Heart sounds: Murmur heard.   Pulmonary:      Effort: Pulmonary effort is normal. No respiratory distress.      Breath sounds: No wheezing, rhonchi or rales.   Musculoskeletal:      Right lower leg: No edema.      Left lower leg: No edema.   Neurological:      General: No focal deficit present.      Mental Status: She is alert and oriented to person, place, and time.      Cranial Nerves: No cranial nerve deficit.      Motor: No weakness.      Gait: Gait normal.   Psychiatric:         Mood and Affect: Mood normal.         Thought Content: Thought content normal.         Judgment: Judgment normal.           Labs in chart were reviewed.      Assessment/Plan:         Diagnoses and all orders for this visit:    Benign essential hypertension    Encounter for screening mammogram for malignant neoplasm of breast  -     Mammo screening bilateral w 3d & cad; Future    Impaired fasting glucose    Other specified hypothyroidism    Encounter for immunization  -     influenza vaccine, high-dose, PF 0.7 mL (FLUZONE HIGH-DOSE)    Hyponatremia        All stable  Cont meds  Rto in 6 m                     Cesilia Bergeron MD      "

## 2023-12-21 ENCOUNTER — APPOINTMENT (OUTPATIENT)
Dept: LAB | Facility: CLINIC | Age: 88
End: 2023-12-21
Payer: MEDICARE

## 2023-12-21 DIAGNOSIS — E03.8 OTHER SPECIFIED HYPOTHYROIDISM: ICD-10-CM

## 2023-12-21 DIAGNOSIS — E78.2 MIXED HYPERLIPIDEMIA: ICD-10-CM

## 2023-12-21 DIAGNOSIS — N18.2 CHRONIC KIDNEY DISEASE, STAGE II (MILD): ICD-10-CM

## 2023-12-21 DIAGNOSIS — M1A.9XX0 CHRONIC GOUT WITHOUT TOPHUS, UNSPECIFIED CAUSE, UNSPECIFIED SITE: ICD-10-CM

## 2023-12-21 DIAGNOSIS — I10 BENIGN ESSENTIAL HYPERTENSION: ICD-10-CM

## 2023-12-21 LAB
ALBUMIN SERPL BCP-MCNC: 4.2 G/DL (ref 3.5–5)
ALP SERPL-CCNC: 84 U/L (ref 34–104)
ALT SERPL W P-5'-P-CCNC: 17 U/L (ref 7–52)
ANION GAP SERPL CALCULATED.3IONS-SCNC: 6 MMOL/L
AST SERPL W P-5'-P-CCNC: 22 U/L (ref 13–39)
BILIRUB SERPL-MCNC: 0.56 MG/DL (ref 0.2–1)
BUN SERPL-MCNC: 26 MG/DL (ref 5–25)
CALCIUM SERPL-MCNC: 9.6 MG/DL (ref 8.4–10.2)
CHLORIDE SERPL-SCNC: 106 MMOL/L (ref 96–108)
CHOLEST SERPL-MCNC: 197 MG/DL
CO2 SERPL-SCNC: 28 MMOL/L (ref 21–32)
CREAT SERPL-MCNC: 1.24 MG/DL (ref 0.6–1.3)
GFR SERPL CREATININE-BSD FRML MDRD: 37 ML/MIN/1.73SQ M
GLUCOSE P FAST SERPL-MCNC: 90 MG/DL (ref 65–99)
HDLC SERPL-MCNC: 53 MG/DL
LDLC SERPL CALC-MCNC: 108 MG/DL (ref 0–100)
NONHDLC SERPL-MCNC: 144 MG/DL
POTASSIUM SERPL-SCNC: 4.5 MMOL/L (ref 3.5–5.3)
PROT SERPL-MCNC: 6.8 G/DL (ref 6.4–8.4)
SODIUM SERPL-SCNC: 140 MMOL/L (ref 135–147)
TRIGL SERPL-MCNC: 178 MG/DL
TSH SERPL DL<=0.05 MIU/L-ACNC: 4.12 UIU/ML (ref 0.45–4.5)
URATE SERPL-MCNC: 5.5 MG/DL (ref 2–7.5)

## 2023-12-21 PROCEDURE — 80061 LIPID PANEL: CPT

## 2023-12-21 PROCEDURE — 84550 ASSAY OF BLOOD/URIC ACID: CPT

## 2023-12-21 PROCEDURE — 84443 ASSAY THYROID STIM HORMONE: CPT

## 2023-12-21 PROCEDURE — 80053 COMPREHEN METABOLIC PANEL: CPT

## 2023-12-21 PROCEDURE — 36415 COLL VENOUS BLD VENIPUNCTURE: CPT

## 2024-01-08 DIAGNOSIS — I10 BENIGN ESSENTIAL HYPERTENSION: ICD-10-CM

## 2024-01-08 DIAGNOSIS — E03.8 SUBCLINICAL HYPOTHYROIDISM: ICD-10-CM

## 2024-01-08 RX ORDER — LEVOTHYROXINE SODIUM 0.05 MG/1
TABLET ORAL
Qty: 90 TABLET | Refills: 1 | Status: SHIPPED | OUTPATIENT
Start: 2024-01-08

## 2024-01-08 RX ORDER — TELMISARTAN 80 MG/1
80 TABLET ORAL DAILY
Qty: 90 TABLET | Refills: 1 | Status: SHIPPED | OUTPATIENT
Start: 2024-01-08

## 2024-01-14 DIAGNOSIS — I10 BENIGN ESSENTIAL HYPERTENSION: ICD-10-CM

## 2024-01-15 RX ORDER — AMLODIPINE BESYLATE 10 MG/1
10 TABLET ORAL DAILY
Qty: 90 TABLET | Refills: 0 | Status: SHIPPED | OUTPATIENT
Start: 2024-01-15

## 2024-01-17 ENCOUNTER — TELEMEDICINE (OUTPATIENT)
Dept: FAMILY MEDICINE CLINIC | Facility: CLINIC | Age: 89
End: 2024-01-17
Payer: MEDICARE

## 2024-01-17 DIAGNOSIS — U07.1 COVID-19: Primary | ICD-10-CM

## 2024-01-17 PROCEDURE — 99441 PR PHYS/QHP TELEPHONE EVALUATION 5-10 MIN: CPT | Performed by: FAMILY MEDICINE

## 2024-01-17 RX ORDER — NIRMATRELVIR AND RITONAVIR 150-100 MG
2 KIT ORAL 2 TIMES DAILY
Qty: 20 TABLET | Refills: 0 | Status: SHIPPED | OUTPATIENT
Start: 2024-01-17 | End: 2024-01-22

## 2024-01-17 NOTE — PROGRESS NOTES
COVID-19 Outpatient Progress Note    Assessment/Plan:    Problem List Items Addressed This Visit    None  Visit Diagnoses     COVID-19    -  Primary    Relevant Medications    nirmatrelvir & ritonavir (Paxlovid, 150/100,) tablet therapy pack      Was advised to hold Crestor      Disposition:     Patient meets criteria for Paxlovid and they have been counseled appropriately regarding risks, benefits, side effects, and alternative treatment options. After discussion, patient agrees to treatment.    Possible side effects of Paxlovid?    Possible side effects of Paxlovid are:  - Liver Problems. Notify us right away if you start to experience loss of appetite, yellowing of your skin and the whites of eyes (jaundice), dark-colored urine, pale colored stools and itchy skin, stomach area (abdominal) pain.  - Resistance to HIV Medicines. If you have untreated HIV infection, Paxlovid may lead to some HIV medicines not working as well in the future.  - Other possible side effects include: altered sense of taste, diarrhea, high blood pressure, or muscle aches.    I have spent a total time of 5 minutes on the day of the encounter for this patient including discussing prognosis, risks and benefits of treatment options and impressions.       Encounter provider: Cesilia Bergeron MD     Provider located at: [facility]  [address] [facility] [address]     Recent Visits  No visits were found meeting these conditions.  Showing recent visits within past 7 days and meeting all other requirements  Today's Visits  Date Type Provider Dept   01/17/24 Telemedicine Cesilia Bergeron MD [facility]   Showing today's visits and meeting all other requirements  Future Appointments  No visits were found meeting these conditions.  Showing future appointments within next 150 days and meeting all other requirements     This virtual check-in was done via telephone and she agrees to  proceed.    Patient agrees to participate in a virtual check in via telephone or video visit instead of presenting to the office to address urgent/immediate medical needs. Patient is aware this is a billable service. She acknowledged consent and understanding of privacy and security of the video platform. The patient has agreed to participate and understands they can discontinue the visit at any time.    After connecting through Telephone, the patient was identified by name and date of birth. Nicki Escobedo was informed that this was a telemedicine visit and that the exam was being conducted confidentially over secure lines. My office door was closed. No one else was in the room. Nicki Escobedo acknowledged consent and understanding of privacy and security of the telemedicine visit. I informed the patient that I have reviewed her record in Epic and presented the opportunity for her to ask any questions regarding the visit today. The patient agreed to participate.    It was my intent to perform this visit via video technology but the patient was not able to do a video connection so the visit was completed via audio telephone only.        Verification of patient location:  Patient is located in the following state in which I hold an active license: [place]    Subjective:   Nicki Escobedo is a 89 y.o. female who is concerned about COVID-19. Patient's symptoms include fatigue, malaise, rhinorrhea, cough and diarrhea. Patient denies fever, chills, congestion, sore throat, anosmia, loss of taste, shortness of breath, chest tightness, abdominal pain, nausea, vomiting, myalgias and headaches.     - Date of symptom onset: 1/14/2024      COVID-19 vaccination status: Fully vaccinated with booster    Exposure:   Contact with a person who is under investigation (PUI) for or who is positive for COVID-19 within the last 14 days?: Yes    Hospitalized recently for fever and/or lower respiratory symptoms?: No      Currently a  healthcare worker that is involved in direct patient care?: No      Works in a special setting where the risk of COVID-19 transmission may be high? (this may include long-term care, correctional and shelter facilities; homeless shelters; assisted-living facilities and group homes.): No      Resident in a special setting where the risk of COVID-19 transmission may be high? (this may include long-term care, correctional and shelter facilities; homeless shelters; assisted-living facilities and group homes.): No      Home + covid test yesterday     Lab Results   Component Value Date    SARSCOV2 Negative 06/05/2021       Review of Systems   Constitutional:  Positive for fatigue. Negative for chills and fever.   HENT:  Positive for rhinorrhea. Negative for congestion and sore throat.    Respiratory:  Positive for cough. Negative for chest tightness and shortness of breath.    Gastrointestinal:  Positive for diarrhea. Negative for abdominal pain, nausea and vomiting.   Musculoskeletal:  Negative for myalgias.   Neurological:  Negative for headaches.     Current Outpatient Medications on File Prior to Visit   Medication Sig   • albuterol (ProAir HFA) 90 mcg/act inhaler Inhale 2 puffs every 6 (six) hours as needed for wheezing   • allopurinol (ZYLOPRIM) 100 mg tablet Take 2 tablets (200 mg total) by mouth daily   • amLODIPine (NORVASC) 10 mg tablet Take 1 tablet by mouth once daily   • beclomethasone (Qvar RediHaler) 80 MCG/ACT inhaler Inhale 2 puffs 2 (two) times a day Rinse mouth after use.   • cycloSPORINE (RESTASIS) 0.05 % ophthalmic emulsion Administer 1 drop to both eyes 2 (two) times a day   • famotidine (PEPCID) 40 MG tablet Take 1 tablet by mouth once daily (Patient not taking: Reported on 10/24/2023)   • fluticasone (FLONASE) 50 mcg/act nasal spray    • levothyroxine 50 mcg tablet Take 1 tablet by mouth once daily   • metoprolol succinate (TOPROL-XL) 25 mg 24 hr tablet Take 1 tablet (25 mg total) by mouth daily  at bedtime   • mupirocin (BACTROBAN) 2 % ointment Apply topically 2 (two) times a day   • rosuvastatin (CRESTOR) 5 mg tablet Take 1 tablet by mouth once daily   • spironolactone (ALDACTONE) 25 mg tablet TAKE 1 TABLET BY MOUTH EVERY OTHER DAY   • telmisartan (MICARDIS) 80 MG tablet Take 1 tablet by mouth once daily   • [DISCONTINUED] colchicine (COLCRYS) 0.6 mg tablet Take 1 tablet (0.6 mg total) by mouth daily for 3 days (Patient not taking: Reported on 7/21/2023)       Objective:    There were no vitals taken for this visit.       Physical Exam  Cesilia Bergeron MD

## 2024-01-30 ENCOUNTER — OFFICE VISIT (OUTPATIENT)
Age: 89
End: 2024-01-30
Payer: MEDICARE

## 2024-01-30 VITALS
DIASTOLIC BLOOD PRESSURE: 68 MMHG | SYSTOLIC BLOOD PRESSURE: 125 MMHG | BODY MASS INDEX: 24.92 KG/M2 | WEIGHT: 123.6 LBS | HEART RATE: 43 BPM | HEIGHT: 59 IN | RESPIRATION RATE: 16 BRPM

## 2024-01-30 DIAGNOSIS — B35.1 ONYCHOMYCOSIS: ICD-10-CM

## 2024-01-30 DIAGNOSIS — I70.209 PERIPHERAL ARTERIOSCLEROSIS (HCC): Primary | ICD-10-CM

## 2024-01-30 DIAGNOSIS — M79.672 PAIN IN BOTH FEET: ICD-10-CM

## 2024-01-30 DIAGNOSIS — M79.671 PAIN IN BOTH FEET: ICD-10-CM

## 2024-01-30 PROCEDURE — 11721 DEBRIDE NAIL 6 OR MORE: CPT | Performed by: PODIATRIST

## 2024-02-05 DIAGNOSIS — E78.2 MIXED HYPERLIPIDEMIA: ICD-10-CM

## 2024-02-05 RX ORDER — ROSUVASTATIN CALCIUM 5 MG/1
TABLET, COATED ORAL
Qty: 90 TABLET | Refills: 1 | Status: SHIPPED | OUTPATIENT
Start: 2024-02-05

## 2024-02-07 NOTE — PROGRESS NOTES
Daily Note     Today's date: 2019  Patient name: Prem Felton  : 1931  MRN: [id number]  Referring provider: Mariann Sandhu MD  Dx:   Encounter Diagnosis     ICD-10-CM    1  Acute pain of left shoulder M25 [id number]                   Subjective: I already did an hour of alexis chi this morning  Objective: See treatment diary below      Assessment: Tolerated treatment well  Patient demonstrated fatigue post treatment      Plan: Progress treatment as tolerated         Precautions: HTN      Manual                                                                                   Exercise Diary  10/23 10/25 10/29 10/31 11/5 11/7       SADD stage 1 perf     hep       Supine Cane Flex, ER  2x20 2x20 2x20 2x20 hep       AROM ER supine  20x 20x 20x 20x hep       AROM SDLY ER  15x 20x 20x 20x hep       Prone EXT  20x 20x 20x 20x 2# x10 reps       TB EXT, ER, IR  Y 3x10 Y 3x10 R 3x10 R 3x10 Red 3x10       Yepez   #3 20x #3 20x #3 20x 3# x20 reps       NuStep      10min L1       pulleys      10x 5 sec hold       TB rows      Red x20 reps       Prone serratus       2# x 20 reps                                                                                                                                Modalities Goal Outcome Evaluation:  Neuro: A&Ox4.   Cardiac: SR. VSS.   Respiratory: Sating above 92% on Bipap at 60-65% fio2.   GI/: Adequate urine output. No BM.   Diet/appetite: Tolerating low sodium low fat diet, NPO post midnight for procedure today. Had hypoglycemia at 0200hr, corrected with apple juice, Latest BS - 156.   Activity:  Independent.  Pain: At acceptable level on current regimen. Denies pain.  Skin: No new deficits noted.  LDA's: PIV at left arm, SL.     Plan: for right heart cath today, cardiac MRI pending. Continue with POC. Notify primary team with changes.

## 2024-02-19 ENCOUNTER — NURSE TRIAGE (OUTPATIENT)
Age: 89
End: 2024-02-19

## 2024-02-19 ENCOUNTER — OFFICE VISIT (OUTPATIENT)
Dept: FAMILY MEDICINE CLINIC | Facility: CLINIC | Age: 89
End: 2024-02-19
Payer: MEDICARE

## 2024-02-19 VITALS
HEART RATE: 54 BPM | SYSTOLIC BLOOD PRESSURE: 120 MMHG | TEMPERATURE: 98.4 F | DIASTOLIC BLOOD PRESSURE: 50 MMHG | WEIGHT: 126 LBS | OXYGEN SATURATION: 94 % | HEIGHT: 59 IN | BODY MASS INDEX: 25.4 KG/M2 | RESPIRATION RATE: 16 BRPM

## 2024-02-19 DIAGNOSIS — R07.89 CHEST TIGHTNESS: ICD-10-CM

## 2024-02-19 DIAGNOSIS — J45.40 MODERATE PERSISTENT ASTHMA WITHOUT COMPLICATION: Primary | ICD-10-CM

## 2024-02-19 PROCEDURE — 99214 OFFICE O/P EST MOD 30 MIN: CPT | Performed by: FAMILY MEDICINE

## 2024-02-19 RX ORDER — PREDNISONE 10 MG/1
TABLET ORAL DAILY
Qty: 21 TABLET | Refills: 0 | Status: SHIPPED | OUTPATIENT
Start: 2024-02-19 | End: 2024-02-28

## 2024-02-19 RX ORDER — ALBUTEROL SULFATE 90 UG/1
2 AEROSOL, METERED RESPIRATORY (INHALATION) EVERY 6 HOURS PRN
Qty: 18 G | Refills: 1 | Status: SHIPPED | OUTPATIENT
Start: 2024-02-19

## 2024-02-19 NOTE — PROGRESS NOTES
Chief Complaint   Patient presents with   • Chest Pain   • Cough   • Nasal Congestion   • Shortness of Breath     Symptoms since last Friday (2/16/24)//Pt was taking robutessin cold medicine and Pt stated that it helped        Patient ID: Nicki Escobedo is a 89 y.o. female.    HPI  Pt is seeing for constant chest tightness x 4 days -  has Asthma -  did not stop Q yaron -  did not use rescue inhaler -  has cough with mucus over 1 m  -  recently had COVID     The following portions of the patient's history were reviewed and updated as appropriate: allergies, current medications, past family history, past medical history, past social history, past surgical history and problem list.    Review of Systems   Constitutional: Negative.    HENT:  Positive for postnasal drip. Negative for congestion and sore throat.    Respiratory:  Positive for cough and chest tightness. Negative for shortness of breath and wheezing.    Cardiovascular:  Negative for chest pain, palpitations and leg swelling.   Gastrointestinal: Negative.        Current Outpatient Medications   Medication Sig Dispense Refill   • albuterol (ProAir HFA) 90 mcg/act inhaler Inhale 2 puffs every 6 (six) hours as needed for wheezing 18 g 1   • allopurinol (ZYLOPRIM) 100 mg tablet Take 2 tablets (200 mg total) by mouth daily 180 tablet 3   • amLODIPine (NORVASC) 10 mg tablet Take 1 tablet by mouth once daily 90 tablet 0   • beclomethasone (Qvar RediHaler) 80 MCG/ACT inhaler Inhale 2 puffs 2 (two) times a day Rinse mouth after use. 33 g 3   • cycloSPORINE (RESTASIS) 0.05 % ophthalmic emulsion Administer 1 drop to both eyes 2 (two) times a day     •       • levothyroxine 50 mcg tablet Take 1 tablet by mouth once daily 90 tablet 1   • metoprolol succinate (TOPROL-XL) 25 mg 24 hr tablet Take 1 tablet (25 mg total) by mouth daily at bedtime 90 tablet 1   •         • rosuvastatin (CRESTOR) 5 mg tablet Take 1 tablet by mouth once daily 90 tablet 1   • spironolactone  "(ALDACTONE) 25 mg tablet TAKE 1 TABLET BY MOUTH EVERY OTHER DAY 45 tablet 0   • telmisartan (MICARDIS) 80 MG tablet Take 1 tablet by mouth once daily 90 tablet 1   • famotidine (PEPCID) 40 MG tablet Take 1 tablet by mouth once daily (Patient not taking: Reported on 10/24/2023) 30 tablet 0   • fluticasone (FLONASE) 50 mcg/act nasal spray  (Patient not taking: Reported on 2/19/2024)     • mupirocin (BACTROBAN) 2 % ointment Apply topically 2 (two) times a day (Patient not taking: Reported on 2/19/2024) 22 g 0     No current facility-administered medications for this visit.       Objective:    /50 (BP Location: Left arm, Patient Position: Sitting, Cuff Size: Standard)   Pulse (!) 54   Temp 98.4 °F (36.9 °C) (Temporal)   Resp 16   Ht 4' 11\" (1.499 m)   Wt 57.2 kg (126 lb)   SpO2 94%   BMI 25.45 kg/m²        Physical Exam  Constitutional:       General: She is not in acute distress.     Appearance: She is not ill-appearing.   HENT:      Nose: No congestion or rhinorrhea.   Cardiovascular:      Rate and Rhythm: Normal rate and regular rhythm.      Heart sounds: Murmur heard.   Pulmonary:      Effort: Pulmonary effort is normal. No respiratory distress.      Breath sounds: No wheezing, rhonchi or rales.   Neurological:      Mental Status: She is alert and oriented to person, place, and time.   Psychiatric:         Mood and Affect: Mood normal.                 Assessment/Plan:         Diagnoses and all orders for this visit:    Moderate persistent asthma without complication  -     will change Qvar to fluticasone-umeclidinium-vilanterol 200-62.5-25 mcg/actuation AEPB inhaler; Inhale 1 puff daily Rinse mouth after use.  -     albuterol (ProAir HFA) 90 mcg/act inhaler; Inhale 2 puffs every 6 (six) hours as needed for wheezing    Chest tightness  -     predniSONE 10 mg tablet; Take 4 tablets (40 mg total) by mouth daily for 3 days, THEN 2 tablets (20 mg total) daily for 3 days, THEN 1 tablet (10 mg total) daily for " 3 days.  Pt to call in 2 wks with progress       Rto in 1 m                   Cesilia Bergeron MD

## 2024-02-19 NOTE — TELEPHONE ENCOUNTER
"Patient called in to report chest tightness and pain in upper back between her shoulder blades from cough with SOB and shallow breathing since Friday 2/16. Patient reports it has not worsened over the weekend and has not improved. RN advised the ER; patient declined based on previous experience at ER and preferred to see PCP. OV scheduled with PCP today at 2:30 PM.    Reason for Disposition   Pain also in shoulder(s) or arm(s) or jaw    Answer Assessment - Initial Assessment Questions  1. LOCATION: \"Where does it hurt?\"        Tightness in chest and upper back between her shoulder blades  2. RADIATION: \"Does the pain go anywhere else?\" (e.g., into neck, jaw, arms, back)      Denies  3. ONSET: \"When did the chest pain begin?\" (Minutes, hours or days)       Friday 2/16  4. PATTERN \"Does the pain come and go, or has it been constant since it started?\"  \"Does it get worse with exertion?\"       Constant  5. DURATION: \"How long does it last\" (e.g., seconds, minutes, hours)      Constant   6. SEVERITY: \"How bad is the pain?\"  (e.g., Scale 1-10; mild, moderate, or severe)     - MILD (1-3): doesn't interfere with normal activities      - MODERATE (4-7): interferes with normal activities or awakens from sleep     - SEVERE (8-10): excruciating pain, unable to do any normal activities        Mild to moderate  7. CARDIAC RISK FACTORS: \"Do you have any history of heart problems or risk factors for heart disease?\" (e.g., angina, prior heart attack; diabetes, high blood pressure, high cholesterol, smoker, or strong family history of heart disease)      HTN, hyperlipidemia, family history of heart disease with siblings  8. PULMONARY RISK FACTORS: \"Do you have any history of lung disease?\"  (e.g., blood clots in lung, asthma, emphysema, birth control pills)      Asthma  9. CAUSE: \"What do you think is causing the chest pain?\"      Unsure if it is asthma;   10. OTHER SYMPTOMS: \"Do you have any other symptoms?\" (e.g., dizziness, " "nausea, vomiting, sweating, fever, difficulty breathing, cough)        Cough post Covid January 2024; shortness of breath due to chest tightness; reports she is shallow breathing  11. PREGNANCY: \"Is there any chance you are pregnant?\" \"When was your last menstrual period?\"        N/a    Protocols used: Chest Pain-ADULT-[place]    "

## 2024-02-28 NOTE — TELEPHONE ENCOUNTER
Pt called in stating Dr. Lomas changed her asthma meds to fluticasone-umeclidinium-vilanterol 200-62.5-25 mcg/actuation AEPB inhaler [[id number]] and her blood pressure is arabella high and she's dizzy. She says she did her research and high blood pressure is a side effect. She took her BP today at noon and it was 167/72.

## 2024-02-29 NOTE — TELEPHONE ENCOUNTER
Called patient. She is no longer dizzy and when she rechecked her bp it was down to 138/65. She has appointment with Dr Lomas 3/20. If symptoms worsen she will call us for sooner appointment.

## 2024-03-01 ENCOUNTER — NURSE TRIAGE (OUTPATIENT)
Age: 89
End: 2024-03-01

## 2024-03-01 DIAGNOSIS — J45.40 MODERATE PERSISTENT ASTHMA WITHOUT COMPLICATION: ICD-10-CM

## 2024-03-01 RX ORDER — BECLOMETHASONE DIPROPIONATE HFA 80 UG/1
2 AEROSOL, METERED RESPIRATORY (INHALATION) 2 TIMES DAILY
Qty: 33 G | Refills: 3 | Status: SHIPPED | OUTPATIENT
Start: 2024-03-01

## 2024-03-01 NOTE — TELEPHONE ENCOUNTER
Regarding: BP spike/dizzy  ----- Message from Gem Hernandez sent at 3/1/2024  2:03 PM EST -----  Nicki is calling about her BP. She states that she has her BP under control and then she started taking Trelegy.   She is not feeling right. She states her BP has gone up to 174/175, she is dizzy and her lips feel weird and tingly.   Attempt to reach triage - no answer.     Please call Nicki back to triage symptoms. Thank you!

## 2024-03-01 NOTE — TELEPHONE ENCOUNTER
"Patient with high blood pressure that started this morning. She states her BP is normally 120/60. At 10:30 this morning her BP was 146/61, at 2 pm her BP was 174/75. Patient c/o lightheadedness, lip tingling and heart palpitations. I told patient to go the the ER for evaluation. Patient refused. She states she started Trelegy last week and she thinks it is related to the new medication.  She is also on amlodipine, telmisartan, and metoprolol for symptoms. She states she is going to stop taking it.  Is the patient able to be seen today by provider? Please follow up.     Reason for Disposition   [1] Systolic BP  >= 160 OR Diastolic >= 100 AND [2] cardiac or neurologic symptoms (e.g., chest pain, difficulty breathing, unsteady gait, blurred vision)    Answer Assessment - Initial Assessment Questions  1. BLOOD PRESSURE: \"What is the blood pressure?\" \"Did you take at least two measurements 5 minutes apart?\"  10:30 am - 146/61  2pm- 174/75    2. ONSET: \"When did you take your blood pressure?\"      2 pm    3. HOW: \"How did you obtain the blood pressure?\" (e.g., visiting nurse, automatic home BP monitor)      Automatic BP    4. HISTORY: \"Do you have a history of high blood pressure?\"      Yes    5. MEDICATIONS: \"Are you taking any medications for blood pressure?\" \"Have you missed any doses recently?\"      Amlodipine, telmisartan and metoprolol     6. OTHER SYMPTOMS: \"Do you have any symptoms?\" (e.g., headache, chest pain, blurred vision, difficulty breathing, weakness)      Lips tingling, heart palpitations, dizziness     7. PREGNANCY: \"Is there any chance you are pregnant?\" \"When was your last menstrual period?\"      N/A    Normally 120/60    Protocols used: High Blood Pressure-ADULT-[place]    "

## 2024-03-01 NOTE — TELEPHONE ENCOUNTER
Dr. Lomas:    Spoke w/Dr. Lomas and advised patient to go to ED.  She feels this is a side effect from Trelegy.  She's asking if you can send substitute medication to [facility] in [place]?

## 2024-03-05 DIAGNOSIS — I10 BENIGN ESSENTIAL HYPERTENSION: ICD-10-CM

## 2024-03-05 RX ORDER — SPIRONOLACTONE 25 MG/1
25 TABLET ORAL EVERY OTHER DAY
Qty: 45 TABLET | Refills: 1 | Status: SHIPPED | OUTPATIENT
Start: 2024-03-05

## 2024-03-14 ENCOUNTER — RA CDI HCC (OUTPATIENT)
Dept: OTHER | Facility: HOSPITAL | Age: 89
End: 2024-03-14

## 2024-03-19 ENCOUNTER — TELEPHONE (OUTPATIENT)
Dept: ADMINISTRATIVE | Facility: OTHER | Age: 89
End: 2024-03-19

## 2024-03-19 NOTE — TELEPHONE ENCOUNTER
03/19/24 11:01 AM    Patient contacted (spoke with patient) to bring Advance Directive, POLST, or Living Will document to next scheduled pcp visit.    Thank you.  June Chris  [facility]

## 2024-03-20 ENCOUNTER — OFFICE VISIT (OUTPATIENT)
Dept: FAMILY MEDICINE CLINIC | Facility: CLINIC | Age: 89
End: 2024-03-20
Payer: MEDICARE

## 2024-03-20 VITALS
OXYGEN SATURATION: 94 % | HEART RATE: 64 BPM | BODY MASS INDEX: 24.8 KG/M2 | RESPIRATION RATE: 16 BRPM | HEIGHT: 59 IN | DIASTOLIC BLOOD PRESSURE: 70 MMHG | WEIGHT: 123 LBS | SYSTOLIC BLOOD PRESSURE: 120 MMHG | TEMPERATURE: 97.5 F

## 2024-03-20 DIAGNOSIS — I10 BENIGN ESSENTIAL HYPERTENSION: Primary | ICD-10-CM

## 2024-03-20 DIAGNOSIS — R73.01 IMPAIRED FASTING GLUCOSE: ICD-10-CM

## 2024-03-20 DIAGNOSIS — E03.8 OTHER SPECIFIED HYPOTHYROIDISM: ICD-10-CM

## 2024-03-20 DIAGNOSIS — J45.40 MODERATE PERSISTENT ASTHMA WITHOUT COMPLICATION: ICD-10-CM

## 2024-03-20 DIAGNOSIS — E78.2 MIXED HYPERLIPIDEMIA: ICD-10-CM

## 2024-03-20 PROBLEM — R10.13 EPIGASTRIC PAIN: Status: RESOLVED | Noted: 2020-11-20 | Resolved: 2024-03-20

## 2024-03-20 PROCEDURE — 99214 OFFICE O/P EST MOD 30 MIN: CPT | Performed by: FAMILY MEDICINE

## 2024-03-20 PROCEDURE — G2211 COMPLEX E/M VISIT ADD ON: HCPCS | Performed by: FAMILY MEDICINE

## 2024-03-20 NOTE — PROGRESS NOTES
Chief Complaint   Patient presents with   • Follow-up     3mo f/u chronic conditions        Patient ID: Nicki Escobedo is a 89 y.o. female.    HPI  Pt is seeing for f/u HTN, Asthma, preDM, Hypothyroidism, HLD -  all stable     The following portions of the patient's history were reviewed and updated as appropriate: allergies, current medications, past family history, past medical history, past social history, past surgical history and problem list.    Review of Systems   Constitutional: Negative.    Respiratory: Negative.     Cardiovascular: Negative.    Gastrointestinal: Negative.    Genitourinary: Negative.    Musculoskeletal: Negative.    Skin: Negative.    Neurological: Negative.    Hematological: Negative.    Psychiatric/Behavioral: Negative.         Current Outpatient Medications   Medication Sig Dispense Refill   • albuterol (ProAir HFA) 90 mcg/act inhaler Inhale 2 puffs every 6 (six) hours as needed for wheezing 18 g 1   • allopurinol (ZYLOPRIM) 100 mg tablet Take 2 tablets (200 mg total) by mouth daily 180 tablet 3   • amLODIPine (NORVASC) 10 mg tablet Take 1 tablet by mouth once daily 90 tablet 0   • beclomethasone (Qvar RediHaler) 80 MCG/ACT inhaler Inhale 2 puffs 2 (two) times a day Rinse mouth after use. 33 g 3   • cycloSPORINE (RESTASIS) 0.05 % ophthalmic emulsion Administer 1 drop to both eyes 2 (two) times a day     • fluticasone (FLONASE) 50 mcg/act nasal spray      • levothyroxine 50 mcg tablet Take 1 tablet by mouth once daily 90 tablet 1   • metoprolol succinate (TOPROL-XL) 25 mg 24 hr tablet Take 1 tablet (25 mg total) by mouth daily at bedtime 90 tablet 1   • rosuvastatin (CRESTOR) 5 mg tablet Take 1 tablet by mouth once daily 90 tablet 1   • spironolactone (ALDACTONE) 25 mg tablet TAKE 1 TABLET BY MOUTH EVERY OTHER DAY 45 tablet 1   • telmisartan (MICARDIS) 80 MG tablet Take 1 tablet by mouth once daily 90 tablet 1   • famotidine (PEPCID) 40 MG tablet Take 1 tablet by mouth once daily 30  "tablet 0   • mupirocin (BACTROBAN) 2 % ointment Apply topically 2 (two) times a day 22 g 0     No current facility-administered medications for this visit.       Objective:    /70 (BP Location: Right arm, Patient Position: Sitting, Cuff Size: Standard)   Pulse 64   Temp 97.5 °F (36.4 °C) (Temporal)   Resp 16   Ht 4' 11\" (1.499 m)   Wt 55.8 kg (123 lb)   SpO2 94%   BMI 24.84 kg/m²        Physical Exam  Constitutional:       General: She is not in acute distress.     Appearance: She is not ill-appearing.   Cardiovascular:      Rate and Rhythm: Normal rate and regular rhythm.      Heart sounds: Murmur heard.      No gallop.   Pulmonary:      Effort: Pulmonary effort is normal. No respiratory distress.      Breath sounds: No wheezing, rhonchi or rales.   Musculoskeletal:      Right lower leg: No edema.      Left lower leg: No edema.   Neurological:      General: No focal deficit present.      Mental Status: She is alert and oriented to person, place, and time.   Psychiatric:         Mood and Affect: Mood normal.         Thought Content: Thought content normal.         Judgment: Judgment normal.           Labs in chart were reviewed.      Assessment/Plan:         Diagnoses and all orders for this visit:    Benign essential hypertension  -     Comprehensive metabolic panel; Future  -     Lipid panel; Future    Impaired fasting glucose  -     Hemoglobin A1C; Future    Other specified hypothyroidism  -     TSH, 3rd generation; Future    Mixed hyperlipidemia  -     Lipid panel; Future    Moderate persistent asthma without complication        All stable  Cont meds      Rto in 6 m for JULIO Bergeron MD      "

## 2024-04-13 DIAGNOSIS — I10 ESSENTIAL HYPERTENSION: ICD-10-CM

## 2024-04-13 DIAGNOSIS — I10 BENIGN ESSENTIAL HYPERTENSION: ICD-10-CM

## 2024-04-13 RX ORDER — AMLODIPINE BESYLATE 10 MG/1
10 TABLET ORAL DAILY
Qty: 90 TABLET | Refills: 0 | Status: SHIPPED | OUTPATIENT
Start: 2024-04-13

## 2024-04-13 RX ORDER — METOPROLOL SUCCINATE 25 MG/1
25 TABLET, EXTENDED RELEASE ORAL
Qty: 90 TABLET | Refills: 0 | Status: SHIPPED | OUTPATIENT
Start: 2024-04-13

## 2024-04-19 ENCOUNTER — HOSPITAL ENCOUNTER (OUTPATIENT)
Dept: RADIOLOGY | Facility: HOSPITAL | Age: 89
Discharge: HOME | End: 2024-04-19
Payer: MEDICARE

## 2024-04-19 DIAGNOSIS — Z12.31 ENCOUNTER FOR SCREENING MAMMOGRAM FOR MALIGNANT NEOPLASM OF BREAST: ICD-10-CM

## 2024-04-19 PROCEDURE — 77067 SCR MAMMO BI INCL CAD: CPT

## 2024-04-19 PROCEDURE — 77063 BREAST TOMOSYNTHESIS BI: CPT

## 2024-04-23 ENCOUNTER — OFFICE VISIT (OUTPATIENT)
Age: 89
End: 2024-04-23
Payer: MEDICARE

## 2024-04-23 VITALS
BODY MASS INDEX: 24.8 KG/M2 | SYSTOLIC BLOOD PRESSURE: 125 MMHG | HEIGHT: 59 IN | DIASTOLIC BLOOD PRESSURE: 65 MMHG | WEIGHT: 123 LBS | HEART RATE: 60 BPM | RESPIRATION RATE: 16 BRPM

## 2024-04-23 DIAGNOSIS — I70.209 PERIPHERAL ARTERIOSCLEROSIS (HCC): Primary | ICD-10-CM

## 2024-04-23 DIAGNOSIS — M79.671 PAIN IN BOTH FEET: ICD-10-CM

## 2024-04-23 DIAGNOSIS — B35.1 ONYCHOMYCOSIS: ICD-10-CM

## 2024-04-23 DIAGNOSIS — M79.672 PAIN IN BOTH FEET: ICD-10-CM

## 2024-04-23 PROCEDURE — 11721 DEBRIDE NAIL 6 OR MORE: CPT | Performed by: PODIATRIST

## 2024-04-23 NOTE — PROGRESS NOTES
Assessment/Plan:  Metatarsalgia secondary to radiculopathy, right greater than left.  Pain upon ambulation.  Mycosis of nail.  Arthralgia.  Peripheral artery disease.      Plan.  Chart reviewed.  Patient advised on condition.   She will follow-up with pain management.  Today all nails debrided without pain or complication.  Nails debrided mechanically with nail nipper.  Return for follow-up as needed for injection.  Aftercare instruction given.            Diagnoses and all orders for this visit:     Metatarsalgia of both feet     Radiculopathy of lumbar region     Hallux valgus of right and left foot     Arthralgia of right and left foot, first MPJ     Onychomycosis     Peripheral artery disease.        Pain upon ambulation            Subjective:  Patient has pain in her feet.  She has pain with ambulation shoe wear.  Right greater than left.  She is aware she has back problems causing foot problems.  She is following up with pain management.  She has stopped taking Cymbalta.  She also gets pain in her toes ambulation and shoe wear.  No history of trauma.  She has been following with pain management               Allergies   Allergen Reactions    Atorvastatin         Other reaction(s): Leg Cramps  Reaction Date: 15Nov2010;     Azithromycin         Other reaction(s): Unknown Allergic Reaction  Reaction Date: 07Jan2004; Annotation - [id number]: jjw    Codeine Nausea Only       Reaction Date: 07Jan2004; Annotation - [id number]: jjw    Moxifloxacin         Other reaction(s): Diarrhea    Penicillins Rash       Reaction Date: 07Jan2004; Annotation - [id number]: jjw            Current Outpatient Medications:     albuterol (PROAIR HFA) 90 mcg/act inhaler, Inhale 2 puffs every 6 (six) hours as needed for wheezing, Disp: 1 Inhaler, Rfl: 2    amLODIPine (NORVASC) 5 mg tablet, Take 1 tablet by mouth twice daily (Patient taking differently: 5 mg daily), Disp: 60 tablet, Rfl: 6    beclomethasone (Qvar RediHaler) 80 MCG/ACT  inhaler, Inhale 2 puffs  in the morning and 2 puffs in the evening. Rinse mouth after use.., Disp: 10.6 g, Rfl: 6    cycloSPORINE (RESTASIS) 0.05 % ophthalmic emulsion, Administer 1 drop to both eyes 2 (two) times a day, Disp: , Rfl:     DULoxetine (CYMBALTA) 30 mg delayed release capsule, Take 1 capsule (30 mg total) by mouth daily, Disp: 30 capsule, Rfl: 0    famotidine (PEPCID) 40 MG tablet, Take 1 tablet by mouth once daily, Disp: 30 tablet, Rfl: 0    levothyroxine 50 mcg tablet, Take 1 tablet by mouth once daily, Disp: 90 tablet, Rfl: 0    metoprolol succinate (TOPROL-XL) 50 mg 24 hr tablet, TAKE 1 TABLET BY MOUTH ONCE DAILY AT BEDTIME, Disp: 90 tablet, Rfl: 1    rosuvastatin (CRESTOR) 5 mg tablet, Take 1 tablet by mouth once daily, Disp: 30 tablet, Rfl: 6    telmisartan-hydrochlorothiazide (MICARDIS HCT) 80-12.5 MG per tablet, Take 1 tablet by mouth once daily, Disp: 90 tablet, Rfl: 0           Patient Active Problem List   Diagnosis    Asthma    Benign essential hypertension    Chronic kidney disease, stage II (mild)    Mixed hyperlipidemia    Osteoporosis    Other specified hypothyroidism    Abnormal CT of the abdomen    Epigastric pain    Abdominal bloating    Delayed emergence from anesthesia    Back pain    Lipoma of back    Heart murmur             Patient ID: Nicki Escobedo is a 89 y.o. female.     HPI     The following portions of the patient's history were reviewed and updated as appropriate:      family history includes Aneurysm in her sister; Breast cancer (age of onset: 80) in her sister; Cancer in her daughter; Heart disease in her brother, brother, brother, daughter, father, and sister; Kidney disease in her sister; No Known Problems in her maternal aunt, maternal aunt, maternal aunt, paternal aunt, and paternal aunt.       reports that she has never smoked. She has never used smokeless tobacco. She reports previous alcohol use. She reports that she does not use drugs.         Objective:  Patient's shoes and socks removed.   Foot ExamPhysical Exam          General  General Appearance: appears stated age and healthy   Orientation: alert and oriented to person, place, and time   Affect: appropriate   Gait: antalgic         Right Foot/Ankle      Inspection and Palpation  Ecchymosis: none  Tenderness: bony tenderness .  Pain patient has inflamed bunion.  No evidence of a infection or gout  Swelling: dorsum   Arch: pes cavus  Claw Toes: second toe  Hallux valgus: no  Hallux limitus: yes  Skin Exam: callus and dry skin;      Neurovascular  Dorsalis pedis: 3+  Posterior tibial: 3+  Saphenous nerve sensation: normal  Tibial nerve sensation: normal  Superficial peroneal nerve sensation: normal  Deep peroneal nerve sensation: normal  Sural nerve sensation: normal        Left Foot/Ankle       Inspection and Palpation  Ecchymosis: none  Swelling: dorsum   Arch: pes planus  Hallux valgus: no  Hallux limitus: yes  Skin Exam: callus and dry skin;      Neurovascular  Dorsalis pedis: 3+  Posterior tibial: 3+  Saphenous nerve sensation: normal  Tibial nerve sensation: normal  Superficial peroneal nerve sensation: normal  Deep peroneal nerve sensation: normal  Sural nerve sensation: normal           Physical Exam  Vitals signs and nursing note reviewed.   Constitutional:       Appearance: Normal appearance.   Cardiovascular:      Pulses:           Dorsalis pedis pulses are 3+ on the right side and 3+ on the left side.        Posterior tibial pulses are 3+ on the right side and 3+ on the left side.   Musculoskeletal:      Right foot: Bony tenderness present. No bunion.      Left foot: No bunion.      Comments: X-ray of right foot demonstrates no evidence of fracture osteomyelitis.  However middle phalanx of 2nd right toe demonstrates cystic change consistent with interosseous tophaceous change.   Feet:      Right foot:      Skin integrity: Callus and dry skin present.      Left foot:      Skin integrity:  Callus and dry skin present.   Skin:     General: Skin is warm.      Patient has 4/5 L5 testing.

## 2024-05-17 ENCOUNTER — TELEPHONE (OUTPATIENT)
Age: 89
End: 2024-05-17

## 2024-05-30 ENCOUNTER — HOSPITAL ENCOUNTER (OUTPATIENT)
Dept: RADIOLOGY | Facility: HOSPITAL | Age: 89
Discharge: HOME | End: 2024-05-30
Attending: FAMILY MEDICINE
Payer: MEDICARE

## 2024-05-30 VITALS — WEIGHT: 125 LBS | BODY MASS INDEX: 25.2 KG/M2 | HEIGHT: 59 IN

## 2024-05-30 DIAGNOSIS — M81.0 AGE-RELATED OSTEOPOROSIS WITHOUT CURRENT PATHOLOGICAL FRACTURE: ICD-10-CM

## 2024-05-30 PROCEDURE — 77080 DXA BONE DENSITY AXIAL: CPT

## 2024-06-03 ENCOUNTER — OFFICE VISIT (OUTPATIENT)
Dept: PAIN MEDICINE | Facility: CLINIC | Age: 89
End: 2024-06-03
Payer: MEDICARE

## 2024-06-03 ENCOUNTER — TELEPHONE (OUTPATIENT)
Dept: PAIN MEDICINE | Facility: CLINIC | Age: 89
End: 2024-06-03

## 2024-06-03 VITALS
HEART RATE: 49 BPM | SYSTOLIC BLOOD PRESSURE: 165 MMHG | WEIGHT: 125 LBS | DIASTOLIC BLOOD PRESSURE: 73 MMHG | BODY MASS INDEX: 25.2 KG/M2 | HEIGHT: 59 IN

## 2024-06-03 DIAGNOSIS — G89.4 CHRONIC PAIN SYNDROME: Primary | ICD-10-CM

## 2024-06-03 DIAGNOSIS — M54.16 LUMBAR RADICULOPATHY: ICD-10-CM

## 2024-06-03 DIAGNOSIS — M48.061 FORAMINAL STENOSIS OF LUMBAR REGION: ICD-10-CM

## 2024-06-03 DIAGNOSIS — M51.16 LUMBAR DISC DISEASE WITH RADICULOPATHY: ICD-10-CM

## 2024-06-03 PROCEDURE — 99214 OFFICE O/P EST MOD 30 MIN: CPT

## 2024-06-03 NOTE — PATIENT INSTRUCTIONS
Epidural Steroid Injection, Ambulatory Care   GENERAL INFORMATION:   What do I need to know about an epidural steroid injection?  An epidural steroid injection (NARA) is a procedure to inject steroid medicine into the epidural space. The epidural space is between your spinal cord and vertebrae. Steroids reduce inflammation and fluid buildup in your spine that may be causing pain. You may be given pain medicine along with the steroids.   How do I prepare for an NARA?  Your healthcare provider will talk to you about how to prepare for your procedure. He will tell you what medicines to take or not take on the day of your procedure. You may need to stop taking blood thinners or other medicines several days before your procedure. You may need to adjust any diabetes medicine you take on the day of your procedure. Steroid medicine can increase your blood sugar level.   What will happen during an NARA?   You will be given medicine to numb the procedure area. You will be awake for the procedure, but you will not feel pain. You may also be given medicine to help you relax during the procedure. Contrast liquid will be used to help your healthcare provider see the area better. Tell the healthcare provider if you have ever had an allergic reaction to contrast liquid.    Your healthcare provider may place the needle into your neck area, middle of your back, or tailbone area. He may inject the medicine next to the nerves that are causing your pain. He may instead inject the medicine into a larger area of the epidural space. This helps the medicine spread to more nerves. Your healthcare provider will use a fluoroscope to help guide the needle to the right place. A fluoroscope is a type of x-ray. After the procedure, a bandage will be placed over the injection site to prevent infection.  What are the risks of an NARA?  You may have temporary or permanent nerve damage or paralysis. You may have bleeding or develop a serious infection,  such as meningitis (swelling of the brain coverings). An abscess may also develop. You may need surgery to fix the abscess. You may have a seizure, anxiety, or trouble sleeping. If you are a man, you may have temporary erectile dysfunction (not able to have an erection).   CARE AGREEMENT:   You have the right to help plan your care. Learn about your health condition and how it may be treated. Discuss treatment options with your caregivers to decide what care you want to receive. You always have the right to refuse treatment. The above information is an  only. It is not intended as medical advice for individual conditions or treatments. Talk to your doctor, nurse or pharmacist before following any medical regimen to see if it is safe and effective for you.  © 2014 [employer] Information is for End User's use only and may not be sold, redistributed or otherwise used for commercial purposes. All illustrations and images included in CareNotes® are the copyrighted property of [employer] Inc[employer] or [employer].

## 2024-06-03 NOTE — TELEPHONE ENCOUNTER
Scheduled patient for TFESI 06/26/2024  Patient denies RX blood thinners/ NSAIDS  Nothing to eat or drink 1 hour prior to procedure  Needs to arrange transportation  Proper clothing for procedure  No vaccines 2 weeks prior or after procedure  If ill or place on antibiotics, please call to reschedule

## 2024-06-04 ENCOUNTER — TELEPHONE (OUTPATIENT)
Dept: FAMILY MEDICINE CLINIC | Facility: CLINIC | Age: 89
End: 2024-06-04

## 2024-06-04 NOTE — TELEPHONE ENCOUNTER
----- Message from Cesilia Bergeron MD sent at 6/3/2024  3:53 PM EDT -----  Pl, advise pt -  jean pierre is needed to discuss DXA scan

## 2024-06-04 NOTE — PROGRESS NOTES
Pain Medicine Follow-Up Note    Assessment:  1. Chronic pain syndrome    2. Lumbar disc disease with radiculopathy    3. Lumbar radiculopathy    4. Foraminal stenosis of lumbar region        Plan:    My impressions and treatment recommendations were discussed in detail with the patient who verbalized understanding and had no further questions.      The patient returns the office with worsening bilateral low back pain that radiates down her lateral side of right leg to the top of her foot.  On 12/14/2022 patient had a right L4-L5, L5-S1 transforaminal epidural steroid injection which provided the patient excellent pain relief for months, but patient was surprised that it has been so long since she had her last injection.  Patient's pain follows the same exact pain pattern as it has in the past by going down the lateral aspect of her right leg following an L4-L5 dermatome distribution I recommend that she have a repeat right L4-L5, L5-S1 transforaminal epidural steroid injection. Complete risks and benefits including bleeding, infection, tissue reaction, nerve injury and allergic reaction were discussed. The approach was demonstrated using models and literature was provided. Verbal and written consent was obtained.    Follow-up is planned in 4 weeks after injection time or sooner as warranted.  Discharge instructions were provided. I personally saw and examined the patient and I agree with the above discussed plan of care.    History of Present Illness:    Nicki Escobedo is a 89 y.o. female who presents to [facility] for interval re-evaluation of the above stated pain complaints. The patient has a past medical and chronic pain history as outlined in the assessment section. She was last seen on 1/17/2023.    At today's visit patient states that their pain symptoms are the same with a pain score of 7/10 on the verbal numeric pain scale.  The patient's pain is worse in the evening.  The  patient's pain is constant in nature.  And the quality of the patient's pain is described as shooting, numbness, and pins-and-needles.  The patient's pain is located in the bilateral low back radiating down her right leg to her right foot.  The patient is currently using Tylenol as needed.    Other than as stated above, the patient denies any interval changes in medications, medical condition, mental condition, symptoms, or allergies since the last office visit.         Review of Systems:    Review of Systems   Respiratory:  Negative for shortness of breath.    Cardiovascular:  Negative for chest pain.   Gastrointestinal:  Negative for constipation, diarrhea, nausea and vomiting.   Musculoskeletal:  Positive for gait problem. Negative for arthralgias, joint swelling and myalgias.        Decreased range of motion, joint stiffness   Skin:  Negative for rash.   Neurological:  Negative for dizziness, seizures and weakness.   All other systems reviewed and are negative.        Past Medical History:   Diagnosis Date    Anesthesia     groggy, difficult to awaken    Asthma     Chronic kidney disease     stage 2 mild    Chronic pain disorder     lumbar herniated discs    Dry eyes     Hyperlipidemia     Hypertension        Past Surgical History:   Procedure Laterality Date    APPENDECTOMY      CATARACT EXTRACTION Bilateral     COLONOSCOPY N/A 10/25/2017    Procedure: COLONOSCOPY;  Surgeon: Austin Finley MD;  Location: [facility] GI LAB;  Service: Gastroenterology    EPIDURAL BLOCK INJECTION Right 12/14/2022    Procedure: L4 L5 S1  TRANSFORAMINAL epidural steroid injection ([id number]);  Surgeon: Edvin Larry DO;  Location: [facility] MAIN OR;  Service: Pain Management     JOINT REPLACEMENT Bilateral     partial knee    TX EXC B9 LESION MRGN XCP SK TG T/A/L >4.0 CM Left 6/11/2021    Procedure: EXCISION  BIOPSY LESION/MASS BACK, LEFT BACK, LEFT CHEST WALL;  Surgeon: Bernie Pardo MD;  Location: [facility] MAIN OR;  Service: General        Family History   Problem Relation Age of Onset    Heart disease Father     Aneurysm Sister     Heart disease Brother     Cancer Daughter         kidney     Heart disease Brother     Heart disease Brother     Heart disease Sister         MI    Kidney disease Sister     Heart disease Daughter     No Known Problems Maternal Aunt     No Known Problems Maternal Aunt     No Known Problems Maternal Aunt     No Known Problems Paternal Aunt     No Known Problems Paternal Aunt     Breast cancer Sister 80       Social History     Occupational History    Not on file   Tobacco Use    Smoking status: Never    Smokeless tobacco: Never   Vaping Use    Vaping status: Never Used   Substance and Sexual Activity    Alcohol use: Not Currently    Drug use: No    Sexual activity: Not on file         Current Outpatient Medications:     albuterol (ProAir HFA) 90 mcg/act inhaler, Inhale 2 puffs every 6 (six) hours as needed for wheezing, Disp: 18 g, Rfl: 1    allopurinol (ZYLOPRIM) 100 mg tablet, Take 2 tablets (200 mg total) by mouth daily, Disp: 180 tablet, Rfl: 3    amLODIPine (NORVASC) 10 mg tablet, Take 1 tablet by mouth once daily, Disp: 90 tablet, Rfl: 0    beclomethasone (Qvar RediHaler) 80 MCG/ACT inhaler, Inhale 2 puffs 2 (two) times a day Rinse mouth after use., Disp: 33 g, Rfl: 3    cycloSPORINE (RESTASIS) 0.05 % ophthalmic emulsion, Administer 1 drop to both eyes 2 (two) times a day, Disp: , Rfl:     fluticasone (FLONASE) 50 mcg/act nasal spray, , Disp: , Rfl:     levothyroxine 50 mcg tablet, Take 1 tablet by mouth once daily, Disp: 90 tablet, Rfl: 1    metoprolol succinate (TOPROL-XL) 25 mg 24 hr tablet, TAKE 1 TABLET BY MOUTH ONCE DAILY AT BEDTIME, Disp: 90 tablet, Rfl: 0    rosuvastatin (CRESTOR) 5 mg tablet, Take 1 tablet by mouth once daily, Disp: 90 tablet, Rfl: 1    spironolactone (ALDACTONE) 25 mg tablet, TAKE 1 TABLET BY MOUTH EVERY OTHER DAY, Disp: 45 tablet, Rfl: 1    telmisartan (MICARDIS) 80 MG tablet, Take 1  "tablet by mouth once daily, Disp: 90 tablet, Rfl: 1    Allergies   Allergen Reactions    Atorvastatin      Other reaction(s): Leg Cramps  Reaction Date: 15Nov2010;     Azithromycin      Other reaction(s): Unknown Allergic Reaction  Reaction Date: 07Jan2004; Annotation - [id number]: jjw    Codeine Nausea Only     Reaction Date: 07Jan2004; Annotation - [id number]: jjw    Moxifloxacin      Other reaction(s): Diarrhea    Paxlovid [Nirmatrelvir-Ritonavir] Diarrhea     Pt stated that she had no appetite when taking medication     Penicillins Rash     Reaction Date: 07Jan2004; Annotation - [id number]: jjw       Physical Exam:    /73   Pulse (!) 49   Ht 4' 11\" (1.499 m)   Wt 56.7 kg (125 lb)   BMI 25.25 kg/m²     Constitutional:normal, well developed, well nourished, alert, in no distress and non-toxic and no overt pain behavior.  Eyes:anicteric  HEENT:grossly intact  Neck:supple, symmetric, trachea midline and no masses   Pulmonary:even and unlabored  Cardiovascular:No edema or pitting edema present  Skin:Normal without rashes or lesions and well hydrated  Psychiatric:Mood and affect appropriate  Neurologic:Cranial Nerves II-XII grossly intact  Musculoskeletal:antalgic gait      This document was created using speech voice recognition software.   Grammatical errors, random word insertions, pronoun errors, and incomplete sentences are an occasional consequence of this system due to software limitations, ambient noise, and hardware issues.   Any formal questions or concerns about content, text, or information contained within the body of this dictation should be directly addressed to the provider for clarification.  "

## 2024-06-05 ENCOUNTER — TELEPHONE (OUTPATIENT)
Dept: ADMINISTRATIVE | Facility: OTHER | Age: 89
End: 2024-06-05

## 2024-06-05 NOTE — TELEPHONE ENCOUNTER
06/05/24 2:49 PM    Patient contacted to bring Advance Directive, POLST, or Living Will document to next scheduled pcp visit.[facility] Department spoke with patient.    Thank you.  Terrie Fuller MA  [facility]

## 2024-06-10 ENCOUNTER — OFFICE VISIT (OUTPATIENT)
Dept: FAMILY MEDICINE CLINIC | Facility: CLINIC | Age: 89
End: 2024-06-10
Payer: MEDICARE

## 2024-06-10 VITALS
HEIGHT: 59 IN | WEIGHT: 127 LBS | HEART RATE: 95 BPM | TEMPERATURE: 98.5 F | SYSTOLIC BLOOD PRESSURE: 132 MMHG | DIASTOLIC BLOOD PRESSURE: 62 MMHG | BODY MASS INDEX: 25.6 KG/M2 | OXYGEN SATURATION: 95 % | RESPIRATION RATE: 14 BRPM

## 2024-06-10 DIAGNOSIS — M81.0 AGE RELATED OSTEOPOROSIS, UNSPECIFIED PATHOLOGICAL FRACTURE PRESENCE: Primary | ICD-10-CM

## 2024-06-10 PROCEDURE — G2211 COMPLEX E/M VISIT ADD ON: HCPCS | Performed by: FAMILY MEDICINE

## 2024-06-10 PROCEDURE — 99214 OFFICE O/P EST MOD 30 MIN: CPT | Performed by: FAMILY MEDICINE

## 2024-06-10 RX ORDER — ALENDRONATE SODIUM 70 MG/1
70 TABLET ORAL
Qty: 12 TABLET | Refills: 3 | Status: SHIPPED | OUTPATIENT
Start: 2024-06-10

## 2024-06-10 NOTE — PROGRESS NOTES
Chief Complaint   Patient presents with   • Follow-up     Pt here to review dexa scan results        Patient ID: Nicki Escobedo is a 89 y.o. female.    HPI  Pt is seeing for f/u abnormal DXA scan -  showed osteoporosis -  pt does not take Ca and Vit D     The following portions of the patient's history were reviewed and updated as appropriate: allergies, current medications, past family history, past medical history, past social history, past surgical history and problem list.    Review of Systems   Constitutional: Negative.    Respiratory: Negative.     Cardiovascular: Negative.    Gastrointestinal: Negative.    Genitourinary: Negative.    Musculoskeletal: Negative.    Skin: Negative.    Neurological: Negative.        Current Outpatient Medications   Medication Sig Dispense Refill   • albuterol (ProAir HFA) 90 mcg/act inhaler Inhale 2 puffs every 6 (six) hours as needed for wheezing 18 g 1   • allopurinol (ZYLOPRIM) 100 mg tablet Take 2 tablets (200 mg total) by mouth daily 180 tablet 3   • amLODIPine (NORVASC) 10 mg tablet Take 1 tablet by mouth once daily 90 tablet 0   • beclomethasone (Qvar RediHaler) 80 MCG/ACT inhaler Inhale 2 puffs 2 (two) times a day Rinse mouth after use. 33 g 3   • cycloSPORINE (RESTASIS) 0.05 % ophthalmic emulsion Administer 1 drop to both eyes 2 (two) times a day     • levothyroxine 50 mcg tablet Take 1 tablet by mouth once daily 90 tablet 1   • metoprolol succinate (TOPROL-XL) 25 mg 24 hr tablet TAKE 1 TABLET BY MOUTH ONCE DAILY AT BEDTIME 90 tablet 0   • rosuvastatin (CRESTOR) 5 mg tablet Take 1 tablet by mouth once daily 90 tablet 1   • spironolactone (ALDACTONE) 25 mg tablet TAKE 1 TABLET BY MOUTH EVERY OTHER DAY 45 tablet 1   • telmisartan (MICARDIS) 80 MG tablet Take 1 tablet by mouth once daily 90 tablet 1   • fluticasone (FLONASE) 50 mcg/act nasal spray  (Patient not taking: Reported on 6/10/2024)       No current facility-administered medications for this visit.  "      Objective:    /62 (BP Location: Left arm, Patient Position: Sitting, Cuff Size: Standard)   Pulse 95   Temp 98.5 °F (36.9 °C) (Temporal)   Resp 14   Ht 4' 11\" (1.499 m)   Wt 57.6 kg (127 lb)   SpO2 95%   BMI 25.65 kg/m²        Physical Exam  Constitutional:       General: She is not in acute distress.     Appearance: She is not ill-appearing.   Cardiovascular:      Rate and Rhythm: Normal rate and regular rhythm.   Pulmonary:      Effort: Pulmonary effort is normal. No respiratory distress.   Neurological:      General: No focal deficit present.      Mental Status: She is alert and oriented to person, place, and time.   Psychiatric:         Mood and Affect: Mood normal.                 Assessment/Plan:         Diagnoses and all orders for this visit:    Age related osteoporosis, unspecified pathological fracture presence  -     alendronate (Fosamax) 70 mg tablet; Take 1 tablet (70 mg total) by mouth every 7 days        Will start Ca with Vit D OTC       Rto in 6 m                     Cesilia Bergeron MD      "

## 2024-06-25 ENCOUNTER — APPOINTMENT (OUTPATIENT)
Dept: LAB | Facility: CLINIC | Age: 89
End: 2024-06-25
Payer: MEDICARE

## 2024-06-25 DIAGNOSIS — E03.8 OTHER SPECIFIED HYPOTHYROIDISM: ICD-10-CM

## 2024-06-25 DIAGNOSIS — E78.2 MIXED HYPERLIPIDEMIA: ICD-10-CM

## 2024-06-25 DIAGNOSIS — I10 BENIGN ESSENTIAL HYPERTENSION: ICD-10-CM

## 2024-06-25 DIAGNOSIS — R73.01 IMPAIRED FASTING GLUCOSE: ICD-10-CM

## 2024-06-25 LAB
ALBUMIN SERPL BCG-MCNC: 4.1 G/DL (ref 3.5–5)
ALP SERPL-CCNC: 75 U/L (ref 34–104)
ALT SERPL W P-5'-P-CCNC: 18 U/L (ref 7–52)
ANION GAP SERPL CALCULATED.3IONS-SCNC: 6 MMOL/L (ref 4–13)
AST SERPL W P-5'-P-CCNC: 18 U/L (ref 13–39)
BILIRUB SERPL-MCNC: 0.51 MG/DL (ref 0.2–1)
BUN SERPL-MCNC: 29 MG/DL (ref 5–25)
CALCIUM SERPL-MCNC: 9.4 MG/DL (ref 8.4–10.2)
CHLORIDE SERPL-SCNC: 108 MMOL/L (ref 96–108)
CHOLEST SERPL-MCNC: 169 MG/DL
CO2 SERPL-SCNC: 24 MMOL/L (ref 21–32)
CREAT SERPL-MCNC: 1.2 MG/DL (ref 0.6–1.3)
EST. AVERAGE GLUCOSE BLD GHB EST-MCNC: 134 MG/DL
GFR SERPL CREATININE-BSD FRML MDRD: 39 ML/MIN/1.73SQ M
GLUCOSE P FAST SERPL-MCNC: 93 MG/DL (ref 65–99)
HBA1C MFR BLD: 6.3 %
HDLC SERPL-MCNC: 57 MG/DL
LDLC SERPL CALC-MCNC: 69 MG/DL (ref 0–100)
NONHDLC SERPL-MCNC: 112 MG/DL
POTASSIUM SERPL-SCNC: 5.1 MMOL/L (ref 3.5–5.3)
PROT SERPL-MCNC: 6.4 G/DL (ref 6.4–8.4)
SODIUM SERPL-SCNC: 138 MMOL/L (ref 135–147)
TRIGL SERPL-MCNC: 216 MG/DL
TSH SERPL DL<=0.05 MIU/L-ACNC: 4.2 UIU/ML (ref 0.45–4.5)

## 2024-06-25 PROCEDURE — 36415 COLL VENOUS BLD VENIPUNCTURE: CPT

## 2024-06-25 PROCEDURE — 84443 ASSAY THYROID STIM HORMONE: CPT

## 2024-06-25 PROCEDURE — 80061 LIPID PANEL: CPT

## 2024-06-25 PROCEDURE — 83036 HEMOGLOBIN GLYCOSYLATED A1C: CPT

## 2024-06-25 PROCEDURE — 80053 COMPREHEN METABOLIC PANEL: CPT

## 2024-06-26 ENCOUNTER — TELEPHONE (OUTPATIENT)
Dept: FAMILY MEDICINE CLINIC | Facility: CLINIC | Age: 89
End: 2024-06-26

## 2024-06-26 NOTE — TELEPHONE ENCOUNTER
----- Message from Cesilia Bergeron MD sent at 6/26/2024 10:33 AM EDT -----  Pl, advise pt -  labs are stable

## 2024-07-02 ENCOUNTER — OFFICE VISIT (OUTPATIENT)
Age: 89
End: 2024-07-02
Payer: MEDICARE

## 2024-07-02 VITALS
DIASTOLIC BLOOD PRESSURE: 65 MMHG | HEART RATE: 52 BPM | WEIGHT: 127 LBS | HEIGHT: 59 IN | SYSTOLIC BLOOD PRESSURE: 138 MMHG | RESPIRATION RATE: 16 BRPM | BODY MASS INDEX: 25.6 KG/M2

## 2024-07-02 DIAGNOSIS — M79.672 PAIN IN BOTH FEET: ICD-10-CM

## 2024-07-02 DIAGNOSIS — M79.671 PAIN IN BOTH FEET: ICD-10-CM

## 2024-07-02 DIAGNOSIS — I70.209 PERIPHERAL ARTERIOSCLEROSIS (HCC): Primary | ICD-10-CM

## 2024-07-02 DIAGNOSIS — B35.1 ONYCHOMYCOSIS: ICD-10-CM

## 2024-07-02 DIAGNOSIS — I10 BENIGN ESSENTIAL HYPERTENSION: ICD-10-CM

## 2024-07-02 PROCEDURE — 11721 DEBRIDE NAIL 6 OR MORE: CPT | Performed by: PODIATRIST

## 2024-07-02 NOTE — PROGRESS NOTES
Assessment/Plan:  Metatarsalgia secondary to radiculopathy, right greater than left.  Pain upon ambulation.  Mycosis of nail.  Arthralgia.  Peripheral artery disease.      Plan.  Chart reviewed.  Patient advised on condition.   She will follow-up with pain management.  Today all nails debrided without pain or complication.  Nails debrided mechanically with nail nipper.  Return for follow-up as needed for injection.  Aftercare instruction given.            Diagnoses and all orders for this visit:     Metatarsalgia of both feet     Radiculopathy of lumbar region     Hallux valgus of right and left foot     Arthralgia of right and left foot, first MPJ     Onychomycosis     Peripheral artery disease.        Pain upon ambulation            Subjective:  Patient has pain in her feet.  She has pain with ambulation shoe wear.  Right greater than left.  She is aware she has back problems causing foot problems.  She is following up with pain management.  She has stopped taking Cymbalta.  She also gets pain in her toes ambulation and shoe wear.  No history of trauma.  She has been following with pain management               Allergies   Allergen Reactions    Atorvastatin         Other reaction(s): Leg Cramps  Reaction Date: 15Nov2010;     Azithromycin         Other reaction(s): Unknown Allergic Reaction  Reaction Date: 07Jan2004; Annotation - [id number]: jjw    Codeine Nausea Only       Reaction Date: 07Jan2004; Annotation - [id number]: jjw    Moxifloxacin         Other reaction(s): Diarrhea    Penicillins Rash       Reaction Date: 07Jan2004; Annotation - [id number]: jjw            Current Outpatient Medications:     albuterol (PROAIR HFA) 90 mcg/act inhaler, Inhale 2 puffs every 6 (six) hours as needed for wheezing, Disp: 1 Inhaler, Rfl: 2    amLODIPine (NORVASC) 5 mg tablet, Take 1 tablet by mouth twice daily (Patient taking differently: 5 mg daily), Disp: 60 tablet, Rfl: 6    beclomethasone (Qvar RediHaler) 80 MCG/ACT  inhaler, Inhale 2 puffs  in the morning and 2 puffs in the evening. Rinse mouth after use.., Disp: 10.6 g, Rfl: 6    cycloSPORINE (RESTASIS) 0.05 % ophthalmic emulsion, Administer 1 drop to both eyes 2 (two) times a day, Disp: , Rfl:     DULoxetine (CYMBALTA) 30 mg delayed release capsule, Take 1 capsule (30 mg total) by mouth daily, Disp: 30 capsule, Rfl: 0    famotidine (PEPCID) 40 MG tablet, Take 1 tablet by mouth once daily, Disp: 30 tablet, Rfl: 0    levothyroxine 50 mcg tablet, Take 1 tablet by mouth once daily, Disp: 90 tablet, Rfl: 0    metoprolol succinate (TOPROL-XL) 50 mg 24 hr tablet, TAKE 1 TABLET BY MOUTH ONCE DAILY AT BEDTIME, Disp: 90 tablet, Rfl: 1    rosuvastatin (CRESTOR) 5 mg tablet, Take 1 tablet by mouth once daily, Disp: 30 tablet, Rfl: 6    telmisartan-hydrochlorothiazide (MICARDIS HCT) 80-12.5 MG per tablet, Take 1 tablet by mouth once daily, Disp: 90 tablet, Rfl: 0           Patient Active Problem List   Diagnosis    Asthma    Benign essential hypertension    Chronic kidney disease, stage II (mild)    Mixed hyperlipidemia    Osteoporosis    Other specified hypothyroidism    Abnormal CT of the abdomen    Epigastric pain    Abdominal bloating    Delayed emergence from anesthesia    Back pain    Lipoma of back    Heart murmur             Patient ID: Nicki Escobedo is a 89 y.o. female.     HPI     The following portions of the patient's history were reviewed and updated as appropriate:      family history includes Aneurysm in her sister; Breast cancer (age of onset: 80) in her sister; Cancer in her daughter; Heart disease in her brother, brother, brother, daughter, father, and sister; Kidney disease in her sister; No Known Problems in her maternal aunt, maternal aunt, maternal aunt, paternal aunt, and paternal aunt.       reports that she has never smoked. She has never used smokeless tobacco. She reports previous alcohol use. She reports that she does not use drugs.         Objective:  Patient's shoes and socks removed.   Foot ExamPhysical Exam          General  General Appearance: appears stated age and healthy   Orientation: alert and oriented to person, place, and time   Affect: appropriate   Gait: antalgic         Right Foot/Ankle      Inspection and Palpation  Ecchymosis: none  Tenderness: bony tenderness .  Pain patient has inflamed bunion.  No evidence of a infection or gout  Swelling: dorsum   Arch: pes cavus  Claw Toes: second toe  Hallux valgus: no  Hallux limitus: yes  Skin Exam: callus and dry skin;      Neurovascular  Dorsalis pedis: 3+  Posterior tibial: 3+  Saphenous nerve sensation: normal  Tibial nerve sensation: normal  Superficial peroneal nerve sensation: normal  Deep peroneal nerve sensation: normal  Sural nerve sensation: normal        Left Foot/Ankle       Inspection and Palpation  Ecchymosis: none  Swelling: dorsum   Arch: pes planus  Hallux valgus: no  Hallux limitus: yes  Skin Exam: callus and dry skin;      Neurovascular  Dorsalis pedis: 3+  Posterior tibial: 3+  Saphenous nerve sensation: normal  Tibial nerve sensation: normal  Superficial peroneal nerve sensation: normal  Deep peroneal nerve sensation: normal  Sural nerve sensation: normal           Physical Exam  Vitals signs and nursing note reviewed.   Constitutional:       Appearance: Normal appearance.   Cardiovascular:      Pulses:           Dorsalis pedis pulses are 3+ on the right side and 3+ on the left side.        Posterior tibial pulses are 3+ on the right side and 3+ on the left side.   Musculoskeletal:      Right foot: Bony tenderness present. No bunion.      Left foot: No bunion.      Comments: X-ray of right foot demonstrates no evidence of fracture osteomyelitis.  However middle phalanx of 2nd right toe demonstrates cystic change consistent with interosseous tophaceous change.   Feet:      Right foot:      Skin integrity: Callus and dry skin present.      Left foot:      Skin integrity:  Callus and dry skin present.   Skin:     General: Skin is warm.      Patient has 4/5 L5 testing.

## 2024-07-03 RX ORDER — TELMISARTAN 80 MG/1
80 TABLET ORAL DAILY
Qty: 100 TABLET | Refills: 1 | Status: SHIPPED | OUTPATIENT
Start: 2024-07-03

## 2024-07-09 DIAGNOSIS — E03.8 SUBCLINICAL HYPOTHYROIDISM: ICD-10-CM

## 2024-07-09 RX ORDER — LEVOTHYROXINE SODIUM 0.05 MG/1
TABLET ORAL
Qty: 100 TABLET | Refills: 1 | Status: SHIPPED | OUTPATIENT
Start: 2024-07-09

## 2024-07-10 ENCOUNTER — HOSPITAL ENCOUNTER (OUTPATIENT)
Facility: AMBULARY SURGERY CENTER | Age: 89
Setting detail: OUTPATIENT SURGERY
Discharge: HOME | End: 2024-07-10
Attending: PHYSICAL MEDICINE & REHABILITATION | Admitting: PHYSICAL MEDICINE & REHABILITATION
Payer: MEDICARE

## 2024-07-10 ENCOUNTER — APPOINTMENT (OUTPATIENT)
Dept: RADIOLOGY | Facility: HOSPITAL | Age: 89
End: 2024-07-10
Payer: MEDICARE

## 2024-07-10 VITALS
RESPIRATION RATE: 18 BRPM | HEIGHT: 59 IN | TEMPERATURE: 98.2 F | WEIGHT: 127 LBS | OXYGEN SATURATION: 97 % | SYSTOLIC BLOOD PRESSURE: 155 MMHG | DIASTOLIC BLOOD PRESSURE: 77 MMHG | HEART RATE: 43 BPM | BODY MASS INDEX: 25.6 KG/M2

## 2024-07-10 PROCEDURE — NC001 PR NO CHARGE: Performed by: PHYSICAL MEDICINE & REHABILITATION

## 2024-07-10 PROCEDURE — 64483 NJX AA&/STRD TFRM EPI L/S 1: CPT | Performed by: PHYSICAL MEDICINE & REHABILITATION

## 2024-07-10 PROCEDURE — 64484 NJX AA&/STRD TFRM EPI L/S EA: CPT | Performed by: PHYSICAL MEDICINE & REHABILITATION

## 2024-07-10 RX ORDER — METHYLPREDNISOLONE ACETATE 40 MG/ML
INJECTION, SUSPENSION INTRA-ARTICULAR; INTRALESIONAL; INTRAMUSCULAR; SOFT TISSUE AS NEEDED
Status: DISCONTINUED | OUTPATIENT
Start: 2024-07-10 | End: 2024-07-10 | Stop reason: HOSPADM

## 2024-07-10 RX ORDER — LIDOCAINE HYDROCHLORIDE 10 MG/ML
INJECTION, SOLUTION EPIDURAL; INFILTRATION; INTRACAUDAL; PERINEURAL AS NEEDED
Status: DISCONTINUED | OUTPATIENT
Start: 2024-07-10 | End: 2024-07-10 | Stop reason: HOSPADM

## 2024-07-10 RX ORDER — BUPIVACAINE HYDROCHLORIDE 2.5 MG/ML
INJECTION, SOLUTION EPIDURAL; INFILTRATION; INTRACAUDAL AS NEEDED
Status: DISCONTINUED | OUTPATIENT
Start: 2024-07-10 | End: 2024-07-10 | Stop reason: HOSPADM

## 2024-07-10 NOTE — DISCHARGE INSTRUCTIONS
Epidural Steroid Injection   WHAT YOU NEED TO KNOW:   An epidural steroid injection ([facility]) is a procedure to inject steroid medicine into the epidural space. The epidural space is between your spinal cord and vertebrae. Steroids reduce inflammation and fluid buildup in your spine that may be causing pain. You may be given pain medicine along with the steroids.          ACTIVITY  Do not drive or operate machinery today.  No strenuous activity today - bending, lifting, etc.  You may resume normal activites starting tomorrow - start slowly and as tolerated.  You may shower today, but no tub baths or hot tubs.  You may have numbness for several hours from the local anesthetic. Please use caution and common sense, especially with weight-bearing activities.    CARE OF THE INJECTION SITE  If you have soreness or pain, apply ice to the area today (20 minutes on/20 minutes off).  Starting tomorrow, you may use warm, moist heat or ice if needed.  You may have an increase or change in your discomfort for 36-48 hours after your treatment.  Apply ice and continue with any pain medication you have been prescribed.  Notify the [facility] if you have any of the following: redness, drainage, swelling, headache, stiff neck or fever above 100°F.    SPECIAL INSTRUCTIONS  Our office will contact you in approximately 14 days for a progress report.    MEDICATIONS  Continue to take all routine medications.  Our office may have instructed you to hold some medications.    As no general anesthesia was used in today's procedure, you should not experience any side effects related to anesthesia.     If you are diabetic, the steroids used in today's injection may temporarily increase your blood sugar levels after the first few days after your injection. Please keep a close eye on your sugars and alert the doctor who manages your diabetes if your sugars are significantly high from your baseline or you are symptomatic.     If you have a  problem specifically related to your procedure, please call our office at [phone].  Problems not related to your procedure should be directed to your primary care physician.

## 2024-07-10 NOTE — OP NOTE
OPERATIVE REPORT  PATIENT NAME: Nicki Escobedo    :  1931  MRN: [id number]  Pt Location: [facility] MINOR/PAIN ROOM 01    SURGERY DATE: 7/10/2024    Surgeons and Role:     * Edvin Larry DO - Primary    Preop Diagnosis:  Lumbar radiculopathy [M54.16]    Post-Op Diagnosis Codes:     * Lumbar radiculopathy [M54.16]    Procedure(s):  Right - RIGHT L4-L5 L5-S1 TRANSFORAMINAL EPIDURAL STEROID INJECTION    Indication: Leg pain  Preoperative diagnosis: Lumbar radiculitis  Postoperative diagnosis: Lumbar radiculitis  Procedure: Fluoroscopically-guided right-sided L4-L5 and 5-S1 transforaminal epidural steroid injection under fluoroscopy  EBL: none  Specimens: not applicable  After discussing the risks, benefits, and alternatives to the procedure, the patient expressed understanding and wished to proceed. The patient was brought to the fluoroscopy suite and placed in the prone position. A procedural pause was conducted to verify: correct patient identity, procedure to be performed and as applicable, correct side and site, correct patient position, and availability of implants, special equipment and special requirements. After identifying the right L4 pedicle fluoroscopically with an oblique view, the skin was sterilely prepped and draped in the usual fashion using Chloraprep skin prep. The skin and subcutaneous tissues were anesthetized with 1% lidocaine. A 3.5 inch 22-gauge spinal needle was then advanced under fluoroscopic guidance to the neural foramen. Appropriate foraminal depth was determined with a lateral fluoroscopic view, and AP visualization confirmed needle positioning at approximately the 6 o'clock position relative to the pedicle. After negative aspiration, Omnipaque 240 contrast was injected using live fluoroscopy confirming appropriate transforaminal spread without evidence of intravascular or intrathecal uptake. Next, a 1.5 ml solution consisting of 20 mg depomedrol with 0.25% bupivacaine was  injected slowly and incrementally into the epidural space. Following the injection the needle was withdrawn.  The procedure was then repeated in the exact same way at the right L5 pedicle with a 3.5 inch 22-gauge spinal needle.  The patient tolerated the procedure well and there were no apparent complications. The patient did not develop any new neurologic deficits. After appropriate observation, the patient was dismissed from the clinic in good condition under their own power.          SIGNATURE: Edvin Larry, DO  DATE: July 10, 2024  TIME: 11:40 AM

## 2024-07-10 NOTE — H&P
History of Present Illness: The patient is a 89 y.o. female who presents with complaints of right-sided low back pain    Past Medical History:   Diagnosis Date    Anesthesia     groggy, difficult to awaken    Asthma     Chronic kidney disease     stage 2 mild    Chronic pain disorder     lumbar herniated discs    Dry eyes     Hyperlipidemia     Hypertension        Past Surgical History:   Procedure Laterality Date    APPENDECTOMY      CATARACT EXTRACTION Bilateral     COLONOSCOPY N/A 10/25/2017    Procedure: COLONOSCOPY;  Surgeon: Austin Finley MD;  Location: [facility] GI LAB;  Service: Gastroenterology    EPIDURAL BLOCK INJECTION Right 12/14/2022    Procedure: L4 L5 S1  TRANSFORAMINAL epidural steroid injection ([id number]);  Surgeon: Edvin Larry DO;  Location: [facility] MAIN OR;  Service: Pain Management     JOINT REPLACEMENT Bilateral     partial knee    SC EXC B9 LESION MRGN XCP SK TG T/A/L >4.0 CM Left 6/11/2021    Procedure: EXCISION  BIOPSY LESION/MASS BACK, LEFT BACK, LEFT CHEST WALL;  Surgeon: Bernie Pardo MD;  Location: [facility] MAIN OR;  Service: General       No current facility-administered medications for this encounter.    Allergies   Allergen Reactions    Atorvastatin      Other reaction(s): Leg Cramps  Reaction Date: 15Nov2010;     Azithromycin      Other reaction(s): Unknown Allergic Reaction  Reaction Date: 07Jan2004; Annotation - [id number]: jjw    Codeine Nausea Only     Reaction Date: 07Jan2004; Annotation - [id number]: jjw    Moxifloxacin      Other reaction(s): Diarrhea    Paxlovid [Nirmatrelvir-Ritonavir] Diarrhea     Pt stated that she had no appetite when taking medication     Penicillins Rash     Reaction Date: 07Jan2004; Annotation - [id number]: jjw       Physical Exam:   Vitals:    07/10/24 1037   BP: 168/77   Pulse: (!) 48   Resp: 18   Temp: 98.2 °F (36.8 °C)   SpO2: 94%     General: Awake, Alert, Oriented x 3, Mood and affect appropriate  Respiratory: Respirations even and  unlabored  Cardiovascular: Peripheral pulses intact; no edema  Musculoskeletal Exam: Tenderness palpation right side lumbar paraspinals    ASA Score: 2    Patient/Chart Verification  Patient ID Verified: Verbal, Armband  ID Band Applied: Yes  H&P( within 30 days) Verified: Yes  Interval H&P(within 24 hr) Complete (required for Outpatients and Surgery Admit only): To be obtained in the Pre-Procedure area  Beta Blocker given : N/A  Pre-op Lab/Test Results Available: N/A  Pregnancy Lab Collected: N/A comment  Does Patient Have a Prosthetic Device/Implant: Yes    Assessment: Lumbar radiculopathy    Plan:  Right-sided L4-L5 and 5-S1 TFESI

## 2024-07-11 DIAGNOSIS — I10 BENIGN ESSENTIAL HYPERTENSION: ICD-10-CM

## 2024-07-12 RX ORDER — AMLODIPINE BESYLATE 10 MG/1
10 TABLET ORAL DAILY
Qty: 100 TABLET | Refills: 1 | Status: SHIPPED | OUTPATIENT
Start: 2024-07-12

## 2024-07-13 DIAGNOSIS — I10 ESSENTIAL HYPERTENSION: ICD-10-CM

## 2024-07-13 RX ORDER — METOPROLOL SUCCINATE 25 MG/1
25 TABLET, EXTENDED RELEASE ORAL
Qty: 90 TABLET | Refills: 1 | Status: SHIPPED | OUTPATIENT
Start: 2024-07-13

## 2024-07-24 ENCOUNTER — TELEPHONE (OUTPATIENT)
Dept: PAIN MEDICINE | Facility: CLINIC | Age: 89
End: 2024-07-24

## 2024-07-30 DIAGNOSIS — M10.9 ACUTE GOUT, UNSPECIFIED CAUSE, UNSPECIFIED SITE: ICD-10-CM

## 2024-07-30 RX ORDER — ALLOPURINOL 100 MG/1
200 TABLET ORAL DAILY
Qty: 200 TABLET | Refills: 1 | Status: SHIPPED | OUTPATIENT
Start: 2024-07-30

## 2024-07-30 NOTE — TELEPHONE ENCOUNTER
Patient called to request a refill for their Allopurinol advised a refill was requested on 07/30/24 and is pending approval. Patient is out of this medication. Informed patient would juan manuel High priority.Patient verbalized understanding and is in agreement.

## 2024-08-12 DIAGNOSIS — E78.2 MIXED HYPERLIPIDEMIA: ICD-10-CM

## 2024-08-12 RX ORDER — ROSUVASTATIN CALCIUM 5 MG/1
TABLET, COATED ORAL
Qty: 90 TABLET | Refills: 1 | Status: SHIPPED | OUTPATIENT
Start: 2024-08-12

## 2024-09-26 DIAGNOSIS — I10 BENIGN ESSENTIAL HYPERTENSION: ICD-10-CM

## 2024-09-26 RX ORDER — SPIRONOLACTONE 25 MG/1
25 TABLET ORAL EVERY OTHER DAY
Qty: 45 TABLET | Refills: 1 | Status: SHIPPED | OUTPATIENT
Start: 2024-09-26

## 2024-10-01 ENCOUNTER — RA CDI HCC (OUTPATIENT)
Dept: OTHER | Facility: HOSPITAL | Age: 89
End: 2024-10-01

## 2024-10-07 ENCOUNTER — OFFICE VISIT (OUTPATIENT)
Dept: FAMILY MEDICINE CLINIC | Facility: CLINIC | Age: 89
End: 2024-10-07
Payer: MEDICARE

## 2024-10-07 VITALS
HEIGHT: 59 IN | WEIGHT: 129 LBS | SYSTOLIC BLOOD PRESSURE: 130 MMHG | DIASTOLIC BLOOD PRESSURE: 80 MMHG | BODY MASS INDEX: 26 KG/M2 | TEMPERATURE: 98 F | OXYGEN SATURATION: 97 % | HEART RATE: 50 BPM | RESPIRATION RATE: 16 BRPM

## 2024-10-07 DIAGNOSIS — R73.01 IMPAIRED FASTING GLUCOSE: ICD-10-CM

## 2024-10-07 DIAGNOSIS — M1A.9XX0 CHRONIC GOUT WITHOUT TOPHUS, UNSPECIFIED CAUSE, UNSPECIFIED SITE: ICD-10-CM

## 2024-10-07 DIAGNOSIS — E78.2 MIXED HYPERLIPIDEMIA: ICD-10-CM

## 2024-10-07 DIAGNOSIS — Z00.00 MEDICARE ANNUAL WELLNESS VISIT, SUBSEQUENT: Primary | ICD-10-CM

## 2024-10-07 DIAGNOSIS — N18.2 CHRONIC KIDNEY DISEASE, STAGE II (MILD): ICD-10-CM

## 2024-10-07 DIAGNOSIS — Z23 ENCOUNTER FOR IMMUNIZATION: ICD-10-CM

## 2024-10-07 DIAGNOSIS — I10 BENIGN ESSENTIAL HYPERTENSION: ICD-10-CM

## 2024-10-07 PROCEDURE — G0008 ADMIN INFLUENZA VIRUS VAC: HCPCS | Performed by: FAMILY MEDICINE

## 2024-10-07 PROCEDURE — G0438 PPPS, INITIAL VISIT: HCPCS | Performed by: FAMILY MEDICINE

## 2024-10-07 PROCEDURE — 90662 IIV NO PRSV INCREASED AG IM: CPT | Performed by: FAMILY MEDICINE

## 2024-10-07 NOTE — PATIENT INSTRUCTIONS
Medicare Preventive Visit Patient Instructions  Thank you for completing your Welcome to Medicare Visit or Medicare Annual Wellness Visit today. Your next wellness visit will be due in one year (10/8/2025).  The screening/preventive services that you may require over the next 5-10 years are detailed below. Some tests may not apply to you based off risk factors and/or age. Screening tests ordered at today's visit but not completed yet may show as past due. Also, please note that scanned in results may not display below.  Preventive Screenings:  Service Recommendations Previous Testing/Comments   Colorectal Cancer Screening  * Colonoscopy    * Fecal Occult Blood Test (FOBT)/Fecal Immunochemical Test (FIT)  * Fecal DNA/Cologuard Test  * Flexible Sigmoidoscopy Age: 45-75 years old   Colonoscopy: every 10 years (may be performed more frequently if at higher risk)  OR  FOBT/FIT: every 1 year  OR  Cologuard: every 3 years  OR  Sigmoidoscopy: every 5 years  Screening may be recommended earlier than age 45 if at higher risk for colorectal cancer. Also, an individualized decision between you and your healthcare provider will decide whether screening between the ages of 76-85 would be appropriate. Colonoscopy: Not on file  FOBT/FIT: Not on file  Cologuard: Not on file  Sigmoidoscopy: Not on file    Screening Not Indicated     Breast Cancer Screening Age: 40+ years old  Frequency: every 1-2 years  Not required if history of left and right mastectomy Mammogram: 04/19/2024    Screening Current   Cervical Cancer Screening Between the ages of 21-29, pap smear recommended once every 3 years.   Between the ages of 30-65, can perform pap smear with HPV co-testing every 5 years.   Recommendations may differ for women with a history of total hysterectomy, cervical cancer, or abnormal pap smears in past. Pap Smear: Not on file    Screening Not Indicated   Hepatitis C Screening Once for adults born between 1945 and 1965  More frequently  in patients at high risk for Hepatitis C Hep C Antibody: Not on file        Diabetes Screening 1-2 times per year if you're at risk for diabetes or have pre-diabetes Fasting glucose: 93 mg/dL (6/25/2024)  A1C: 6.3 % (6/25/2024)  Screening Current   Cholesterol Screening Once every 5 years if you don't have a lipid disorder. May order more often based on risk factors. Lipid panel: 06/25/2024    Screening Not Indicated  History Lipid Disorder     Other Preventive Screenings Covered by Medicare:  Abdominal Aortic Aneurysm (AAA) Screening: covered once if your at risk. You're considered to be at risk if you have a family history of AAA.  Lung Cancer Screening: covers low dose CT scan once per year if you meet all of the following conditions: (1) Age 55-77; (2) No signs or symptoms of lung cancer; (3) Current smoker or have quit smoking within the last 15 years; (4) You have a tobacco smoking history of at least 20 pack years (packs per day multiplied by number of years you smoked); (5) You get a written order from a healthcare provider.  Glaucoma Screening: covered annually if you're considered high risk: (1) You have diabetes OR (2) Family history of glaucoma OR (3)  aged 50 and older OR (4)  American aged 65 and older  Osteoporosis Screening: covered every 2 years if you meet one of the following conditions: (1) You're estrogen deficient and at risk for osteoporosis based off medical history and other findings; (2) Have a vertebral abnormality; (3) On glucocorticoid therapy for more than 3 months; (4) Have primary hyperparathyroidism; (5) On osteoporosis medications and need to assess response to drug therapy.   Last bone density test (DXA Scan): 05/30/2024.  HIV Screening: covered annually if you're between the age of 15-65. Also covered annually if you are younger than 15 and older than 65 with risk factors for HIV infection. For pregnant patients, it is covered up to 3 times per  pregnancy.    Immunizations:  Immunization Recommendations   Influenza Vaccine Annual influenza vaccination during flu season is recommended for all persons aged >= 6 months who do not have contraindications   Pneumococcal Vaccine   * Pneumococcal conjugate vaccine = PCV13 (Prevnar 13), PCV15 (Vaxneuvance), PCV20 (Prevnar 20)  * Pneumococcal polysaccharide vaccine = PPSV23 (Pneumovax) Adults 19-65 yo with certain risk factors or if 65+ yo  If never received any pneumonia vaccine: recommend Prevnar 20 (PCV20)  Give PCV20 if previously received 1 dose of PCV13 or PPSV23   Hepatitis B Vaccine 3 dose series if at intermediate or high risk (ex: diabetes, end stage renal disease, liver disease)   Respiratory syncytial virus (RSV) Vaccine - COVERED BY MEDICARE PART D  * RSVPreF3 (Arexvy) CDC recommends that adults 60 years of age and older may receive a single dose of RSV vaccine using shared clinical decision-making (SCDM)   Tetanus (Td) Vaccine - COST NOT COVERED BY MEDICARE PART B Following completion of primary series, a booster dose should be given every 10 years to maintain immunity against tetanus. Td may also be given as tetanus wound prophylaxis.   Tdap Vaccine - COST NOT COVERED BY MEDICARE PART B Recommended at least once for all adults. For pregnant patients, recommended with each pregnancy.   Shingles Vaccine (Shingrix) - COST NOT COVERED BY MEDICARE PART B  2 shot series recommended in those 19 years and older who have or will have weakened immune systems or those 50 years and older     Health Maintenance Due:      Topic Date Due   • Breast Cancer Screening: Mammogram  04/19/2025     Immunizations Due:      Topic Date Due   • COVID-19 Vaccine (4 - 2023-24 season) 09/01/2024     Advance Directives   What are advance directives?  Advance directives are legal documents that state your wishes and plans for medical care. These plans are made ahead of time in case you lose your ability to make decisions for  yourself. Advance directives can apply to any medical decision, such as the treatments you want, and if you want to donate organs.   What are the types of advance directives?  There are many types of advance directives, and each state has rules about how to use them. You may choose a combination of any of the following:  Living will:  This is a written record of the treatment you want. You can also choose which treatments you do not want, which to limit, and which to stop at a certain time. This includes surgery, medicine, IV fluid, and tube feedings.   Durable power of  for healthcare (DPAHC):  This is a written record that states who you want to make healthcare choices for you when you are unable to make them for yourself. This person, called a proxy, is usually a family member or a friend. You may choose more than 1 proxy.  Do not resuscitate (DNR) order:  A DNR order is used in case your heart stops beating or you stop breathing. It is a request not to have certain forms of treatment, such as CPR. A DNR order may be included in other types of advance directives.  Medical directive:  This covers the care that you want if you are in a coma, near death, or unable to make decisions for yourself. You can list the treatments you want for each condition. Treatment may include pain medicine, surgery, blood transfusions, dialysis, IV or tube feedings, and a ventilator (breathing machine).  Values history:  This document has questions about your views, beliefs, and how you feel and think about life. This information can help others choose the care that you would choose.  Why are advance directives important?  An advance directive helps you control your care. Although spoken wishes may be used, it is better to have your wishes written down. Spoken wishes can be misunderstood, or not followed. Treatments may be given even if you do not want them. An advance directive may make it easier for your family to make  difficult choices about your care.   Fall Prevention    Fall prevention  includes ways to make your home and other areas safer. It also includes ways you can move more carefully to prevent a fall. Health conditions that cause changes in your blood pressure, vision, or muscle strength and coordination may increase your risk for falls. Medicines may also increase your risk for falls if they make you dizzy, weak, or sleepy.   Fall prevention tips:   Stand or sit up slowly.    Use assistive devices as directed.    Wear shoes that fit well and have soles that .    Wear a personal alarm.    Stay active.    Manage your medical conditions.    Home Safety Tips:  Add items to prevent falls in the bathroom.    Keep paths clear.    Install bright lights in your home.    Keep items you use often on shelves within reach.    Paint or place reflective tape on the edges of your stairs.    Weight Management   Why it is important to manage your weight:  Being overweight increases your risk of health conditions such as heart disease, high blood pressure, type 2 diabetes, and certain types of cancer. It can also increase your risk for osteoarthritis, sleep apnea, and other respiratory problems. Aim for a slow, steady weight loss. Even a small amount of weight loss can lower your risk of health problems.  How to lose weight safely:  A safe and healthy way to lose weight is to eat fewer calories and get regular exercise. You can lose up about 1 pound a week by decreasing the number of calories you eat by 500 calories each day.   Healthy meal plan for weight management:  A healthy meal plan includes a variety of foods, contains fewer calories, and helps you stay healthy. A healthy meal plan includes the following:  Eat whole-grain foods more often.  A healthy meal plan should contain fiber. Fiber is the part of grains, fruits, and vegetables that is not broken down by your body. Whole-grain foods are healthy and provide extra fiber in  your diet. Some examples of whole-grain foods are whole-wheat breads and pastas, oatmeal, brown rice, and bulgur.  Eat a variety of vegetables every day.  Include dark, leafy greens such as spinach, kale, tammy greens, and mustard greens. Eat yellow and orange vegetables such as carrots, sweet potatoes, and winter squash.   Eat a variety of fruits every day.  Choose fresh or canned fruit (canned in its own juice or light syrup) instead of juice. Fruit juice has very little or no fiber.  Eat low-fat dairy foods.  Drink fat-free (skim) milk or 1% milk. Eat fat-free yogurt and low-fat cottage cheese. Try low-fat cheeses such as mozzarella and other reduced-fat cheeses.  Choose meat and other protein foods that are low in fat.  Choose beans or other legumes such as split peas or lentils. Choose fish, skinless poultry (chicken or turkey), or lean cuts of red meat (beef or pork). Before you cook meat or poultry, cut off any visible fat.   Use less fat and oil.  Try baking foods instead of frying them. Add less fat, such as margarine, sour cream, regular salad dressing and mayonnaise to foods. Eat fewer high-fat foods. Some examples of high-fat foods include french fries, doughnuts, ice cream, and cakes.  Eat fewer sweets.  Limit foods and drinks that are high in sugar. This includes candy, cookies, regular soda, and sweetened drinks.  Exercise:  Exercise at least 30 minutes per day on most days of the week. Some examples of exercise include walking, biking, dancing, and swimming. You can also fit in more physical activity by taking the stairs instead of the elevator or parking farther away from stores. Ask your healthcare provider about the best exercise plan for you.      © Copyright [employer] 2018 Information is for End User's use only and may not be sold, redistributed or otherwise used for commercial purposes. All illustrations and images included in CareNotes® are the copyrighted property of [employer] or  [facility]

## 2024-10-07 NOTE — PROGRESS NOTES
Ambulatory Visit  Name: Nicki Escobedo      : 1931      MRN: [id number]  Encounter Provider: Cesilia Bergeron MD  Encounter Date: 10/7/2024   Encounter department: [facility]    Assessment & Plan       Preventive health issues were discussed with patient, and age appropriate screening tests were ordered as noted in patient's After Visit Summary. Personalized health advice and appropriate referrals for health education or preventive services given if needed, as noted in patient's After Visit Summary.    History of Present Illness     HPI   Patient Care Team:  Cesilia Bergeron MD as PCP - General  MD Austin Jackson MD as Endoscopist    Review of Systems  Medical History Reviewed by provider this encounter:       Annual Wellness Visit Questionnaire   Nicki is here for her Subsequent Wellness visit.     Health Risk Assessment:   Patient rates overall health as good. Patient feels that their physical health rating is same. Patient is satisfied with their life. Eyesight was rated as same. Hearing was rated as same. Patient feels that their emotional and mental health rating is same. Patients states they are never, rarely angry. Patient states they are sometimes unusually tired/fatigued. Pain experienced in the last 7 days has been some. Patient's pain rating has been 5/10. Patient states that she has experienced no weight loss or gain in last 6 months.     Depression Screening:   PHQ-2 Score: 0      Fall Risk Screening:   In the past year, patient has experienced: history of falling in past year    Number of falls: 1  Injured during fall?: Yes    Feels unsteady when standing or walking?: No    Worried about falling?: No      Urinary Incontinence Screening:   Patient has not leaked urine accidently in the last six months.     Home Safety:  Patient does not have trouble with stairs inside or outside of their home. Patient has working smoke alarms and has working carbon  monoxide detector. Home safety hazards include: none.     Nutrition:   Current diet is Regular.     Medications:   Patient is not currently taking any over-the-counter supplements. Patient is able to manage medications.     Activities of Daily Living (ADLs)/Instrumental Activities of Daily Living (IADLs):   Walk and transfer into and out of bed and chair?: Yes  Dress and groom yourself?: Yes    Bathe or shower yourself?: Yes    Feed yourself? Yes  Do your laundry/housekeeping?: Yes  Manage your money, pay your bills and track your expenses?: Yes  Make your own meals?: Yes    Do your own shopping?: Yes    Previous Hospitalizations:   Any hospitalizations or ED visits within the last 12 months?: No      Advance Care Planning:   Living will: Yes    Durable POA for healthcare: Yes    Advanced directive: Yes      Cognitive Screening:   Provider or family/friend/caregiver concerned regarding cognition?: No    PREVENTIVE SCREENINGS      Cardiovascular Screening:    General: Screening Not Indicated and History Lipid Disorder      Diabetes Screening:     General: Screening Current      Colorectal Cancer Screening:     General: Screening Not Indicated      Breast Cancer Screening:     General: Screening Current      Cervical Cancer Screening:    General: Screening Not Indicated      Osteoporosis Screening:    General: Screening Not Indicated and History Osteoporosis      Abdominal Aortic Aneurysm (AAA) Screening:        General: Screening Not Indicated      Lung Cancer Screening:     General: Screening Not Indicated      Hepatitis C Screening:    General: Screening Not Indicated    Screening, Brief Intervention, and Referral to Treatment (SBIRT)    Screening  Typical number of drinks in a day: 0  Typical number of drinks in a week: 0  Interpretation: Low risk drinking behavior.    Single Item Drug Screening:  How often have you used an illegal drug (including marijuana) or a prescription medication for non-medical reasons in  "the past year? never    Single Item Drug Screen Score: 0  Interpretation: Negative screen for possible drug use disorder    Social Determinants of Health     Financial Resource Strain: Low Risk  (9/19/2023)    Overall Financial Resource Strain (CARDIA)     Difficulty of Paying Living Expenses: Not hard at all   Transportation Needs: No Transportation Needs (9/19/2023)    PRAPARE - Transportation     Lack of Transportation (Medical): No     Lack of Transportation (Non-Medical): No     No results found.    Objective     /80 (BP Location: Left arm, Patient Position: Sitting, Cuff Size: Standard)   Pulse (!) 50   Temp 98 °F (36.7 °C) (Temporal)   Resp 16   Ht 4' 11\" (1.499 m)   Wt 58.5 kg (129 lb)   SpO2 97%   BMI 26.05 kg/m²     Physical Exam    "

## 2024-10-14 ENCOUNTER — TELEPHONE (OUTPATIENT)
Dept: FAMILY MEDICINE CLINIC | Facility: CLINIC | Age: 89
End: 2024-10-14

## 2024-10-14 NOTE — TELEPHONE ENCOUNTER
Patient dropped off form for cooling benefit which I placed in Dr Lomas's folder. Please call when ready for .

## 2024-10-22 DIAGNOSIS — J45.40 MODERATE PERSISTENT ASTHMA WITHOUT COMPLICATION: ICD-10-CM

## 2024-10-23 RX ORDER — BECLOMETHASONE DIPROPIONATE HFA 80 UG/1
2 AEROSOL, METERED RESPIRATORY (INHALATION) 2 TIMES DAILY
Qty: 11 G | Refills: 1 | Status: SHIPPED | OUTPATIENT
Start: 2024-10-23

## 2024-11-07 ENCOUNTER — TELEPHONE (OUTPATIENT)
Dept: FAMILY MEDICINE CLINIC | Facility: CLINIC | Age: 89
End: 2024-11-07

## 2024-11-07 NOTE — TELEPHONE ENCOUNTER
Patient dropped off [employer] placard form which I placed in Dr Lomas's folder and advised patient she is out of office today. Please call when ready for .

## 2024-12-04 ENCOUNTER — OFFICE VISIT (OUTPATIENT)
Age: 89
End: 2024-12-04
Payer: MEDICARE

## 2024-12-04 VITALS
DIASTOLIC BLOOD PRESSURE: 74 MMHG | HEIGHT: 59 IN | WEIGHT: 129 LBS | BODY MASS INDEX: 26 KG/M2 | SYSTOLIC BLOOD PRESSURE: 151 MMHG | HEART RATE: 74 BPM | RESPIRATION RATE: 16 BRPM

## 2024-12-04 DIAGNOSIS — M79.671 PAIN IN BOTH FEET: ICD-10-CM

## 2024-12-04 DIAGNOSIS — M79.672 PAIN IN BOTH FEET: ICD-10-CM

## 2024-12-04 DIAGNOSIS — B35.1 ONYCHOMYCOSIS: ICD-10-CM

## 2024-12-04 DIAGNOSIS — M21.962 ACQUIRED DEFORMITY OF LEFT FOOT: Primary | ICD-10-CM

## 2024-12-04 DIAGNOSIS — M77.41 METATARSALGIA OF BOTH FEET: ICD-10-CM

## 2024-12-04 DIAGNOSIS — M67.472 GANGLION CYST OF LEFT FOOT: ICD-10-CM

## 2024-12-04 DIAGNOSIS — M25.571 ARTHRALGIA OF RIGHT FOOT: ICD-10-CM

## 2024-12-04 DIAGNOSIS — M25.572 ARTHRALGIA OF LEFT FOOT: ICD-10-CM

## 2024-12-04 DIAGNOSIS — M54.16 RADICULOPATHY OF LUMBAR REGION: ICD-10-CM

## 2024-12-04 DIAGNOSIS — M77.42 METATARSALGIA OF BOTH FEET: ICD-10-CM

## 2024-12-04 DIAGNOSIS — I70.209 PERIPHERAL ARTERIOSCLEROSIS (HCC): ICD-10-CM

## 2024-12-04 PROCEDURE — 11721 DEBRIDE NAIL 6 OR MORE: CPT | Performed by: PODIATRIST

## 2024-12-04 PROCEDURE — RECHECK: Performed by: PODIATRIST

## 2024-12-04 PROCEDURE — 20605 DRAIN/INJ JOINT/BURSA W/O US: CPT | Performed by: PODIATRIST

## 2024-12-04 NOTE — PROGRESS NOTES
"  Assessment/Plan:  Metatarsalgia secondary to radiculopathy, right greater than left.  Pain upon ambulation.  Mycosis of nail.  Arthralgia.  Peripheral artery disease.  Arthralgia left instep.  Secondary ganglion left foot.  Pain.     Plan.  Chart reviewed.  Patient advised on condition.   She will follow-up with pain management.  Today all nails debrided without pain or complication.  Nails debrided mechanically with nail nipper.  Return for follow-up as needed for injection.  Aftercare instruction given.    In order to evaluate osteoarthritis and deformity, left foot x-ray ordered.  In addition aspiration done left foot ganglion.  Return for follow-up and options after evaluation of x-ray.    Medium joint arthrocentesis  Universal Protocol:  procedure performed by consultantConsent: Verbal consent obtained. Written consent not obtained.  Risks and benefits: risks, benefits and alternatives were discussed  Consent given by: patient  Time out: Immediately prior to procedure a \"time out\" was called to verify the correct patient, procedure, equipment, support staff and site/side marked as required.  Timeout called at: 12/4/2024 2:14 PM.  Patient understanding: patient states understanding of the procedure being performed  Patient identity confirmed: verbally with patient  Supporting Documentation  Indications: joint swelling   Procedure Details  Location: -   Location: [facility].Preparation: Patient was prepped and draped in the usual sterile fashion  Needle size: 25 G  Ultrasound guidance: no  Approach: anterolateral  Medications administered: 10 mg triamcinolone acetonide 10 mg/mL    Aspirate: yellow  Patient tolerance: patient tolerated the procedure well with no immediate complications  Dressing:  Sterile dressing applied                    Diagnoses and all orders for this visit:     Metatarsalgia of both feet     Radiculopathy of lumbar region     Hallux valgus of right and left foot     Arthralgia of " right and left foot, first MPJ     Onychomycosis     Peripheral artery disease.        Pain upon ambulation            Subjective:  Patient has pain in her feet.  She has pain with ambulation shoe wear.  Right greater than left.  She is aware she has back problems causing foot problems.  She is following up with pain management.  She has stopped taking Cymbalta.  She also gets pain in her toes ambulation and shoe wear.  No history of trauma.  She has been following with pain management               Allergies   Allergen Reactions    Atorvastatin         Other reaction(s): Leg Cramps  Reaction Date: 15Nov2010;     Azithromycin         Other reaction(s): Unknown Allergic Reaction  Reaction Date: 07Jan2004; Annotation - [id number]: jjw    Codeine Nausea Only       Reaction Date: 07Jan2004; Annotation - [id number]: jjw    Moxifloxacin         Other reaction(s): Diarrhea    Penicillins Rash       Reaction Date: 07Jan2004; Annotation - [id number]: jjw            Current Outpatient Medications:     albuterol (PROAIR HFA) 90 mcg/act inhaler, Inhale 2 puffs every 6 (six) hours as needed for wheezing, Disp: 1 Inhaler, Rfl: 2    amLODIPine (NORVASC) 5 mg tablet, Take 1 tablet by mouth twice daily (Patient taking differently: 5 mg daily), Disp: 60 tablet, Rfl: 6    beclomethasone (Qvar RediHaler) 80 MCG/ACT inhaler, Inhale 2 puffs  in the morning and 2 puffs in the evening. Rinse mouth after use.., Disp: 10.6 g, Rfl: 6    cycloSPORINE (RESTASIS) 0.05 % ophthalmic emulsion, Administer 1 drop to both eyes 2 (two) times a day, Disp: , Rfl:     DULoxetine (CYMBALTA) 30 mg delayed release capsule, Take 1 capsule (30 mg total) by mouth daily, Disp: 30 capsule, Rfl: 0    famotidine (PEPCID) 40 MG tablet, Take 1 tablet by mouth once daily, Disp: 30 tablet, Rfl: 0    levothyroxine 50 mcg tablet, Take 1 tablet by mouth once daily, Disp: 90 tablet, Rfl: 0    metoprolol succinate (TOPROL-XL) 50 mg 24 hr tablet, TAKE 1 TABLET BY MOUTH ONCE  DAILY AT BEDTIME, Disp: 90 tablet, Rfl: 1    rosuvastatin (CRESTOR) 5 mg tablet, Take 1 tablet by mouth once daily, Disp: 30 tablet, Rfl: 6    telmisartan-hydrochlorothiazide (MICARDIS HCT) 80-12.5 MG per tablet, Take 1 tablet by mouth once daily, Disp: 90 tablet, Rfl: 0           Patient Active Problem List   Diagnosis    Asthma    Benign essential hypertension    Chronic kidney disease, stage II (mild)    Mixed hyperlipidemia    Osteoporosis    Other specified hypothyroidism    Abnormal CT of the abdomen    Epigastric pain    Abdominal bloating    Delayed emergence from anesthesia    Back pain    Lipoma of back    Heart murmur             Patient ID: Nicki Escobedo is a 89 y.o. female.     HPI     The following portions of the patient's history were reviewed and updated as appropriate:      family history includes Aneurysm in her sister; Breast cancer (age of onset: 80) in her sister; Cancer in her daughter; Heart disease in her brother, brother, brother, daughter, father, and sister; Kidney disease in her sister; No Known Problems in her maternal aunt, maternal aunt, maternal aunt, paternal aunt, and paternal aunt.       reports that she has never smoked. She has never used smokeless tobacco. She reports previous alcohol use. She reports that she does not use drugs.        Objective:  Patient's shoes and socks removed.   Foot ExamPhysical Exam          General  General Appearance: appears stated age and healthy   Orientation: alert and oriented to person, place, and time   Affect: appropriate   Gait: antalgic         Right Foot/Ankle      Inspection and Palpation  Ecchymosis: none  Tenderness: bony tenderness .  Pain patient has inflamed bunion.  No evidence of a infection or gout  Swelling: dorsum   Arch: pes cavus  Claw Toes: second toe  Hallux valgus: no  Hallux limitus: yes  Skin Exam: callus and dry skin;      Neurovascular  Dorsalis pedis: 3+  Posterior tibial: 3+  Saphenous nerve sensation:  normal  Tibial nerve sensation: normal  Superficial peroneal nerve sensation: normal  Deep peroneal nerve sensation: normal  Sural nerve sensation: normal        Left Foot/Ankle       Inspection and Palpation  Ecchymosis: none  Swelling: dorsum   Arch: pes planus  Hallux valgus: no  Hallux limitus: yes  Skin Exam: callus and dry skin;      Neurovascular  Dorsalis pedis: 3+  Posterior tibial: 3+  Saphenous nerve sensation: normal  Tibial nerve sensation: normal  Superficial peroneal nerve sensation: normal  Deep peroneal nerve sensation: normal  Sural nerve sensation: normal           Physical Exam  Vitals signs and nursing note reviewed.   Constitutional:       Appearance: Normal appearance.   Cardiovascular:      Pulses:           Dorsalis pedis pulses are 3+ on the right side and 3+ on the left side.        Posterior tibial pulses are 3+ on the right side and 3+ on the left side.   Musculoskeletal:      Right foot: Bony tenderness present. No bunion.      Left foot: No bunion.      Comments: X-ray of right foot demonstrates no evidence of fracture osteomyelitis.  However middle phalanx of 2nd right toe demonstrates cystic change consistent with interosseous tophaceous change.   Feet:      Right foot:      Skin integrity: Callus and dry skin present.      Left foot:      Skin integrity: Callus and dry skin present.   Skin:     General: Skin is warm.      Patient has 4/5 L5 testing.

## 2024-12-05 ENCOUNTER — APPOINTMENT (OUTPATIENT)
Dept: RADIOLOGY | Facility: CLINIC | Age: 89
End: 2024-12-05
Payer: MEDICARE

## 2024-12-05 DIAGNOSIS — M21.962 ACQUIRED DEFORMITY OF LEFT FOOT: ICD-10-CM

## 2024-12-05 PROCEDURE — 73630 X-RAY EXAM OF FOOT: CPT

## 2024-12-26 DIAGNOSIS — J45.40 MODERATE PERSISTENT ASTHMA WITHOUT COMPLICATION: ICD-10-CM

## 2024-12-27 RX ORDER — BECLOMETHASONE DIPROPIONATE HFA 80 UG/1
AEROSOL, METERED RESPIRATORY (INHALATION)
Qty: 11 G | Refills: 5 | Status: SHIPPED | OUTPATIENT
Start: 2024-12-27

## 2024-12-30 ENCOUNTER — APPOINTMENT (OUTPATIENT)
Dept: LAB | Facility: CLINIC | Age: 89
End: 2024-12-30
Payer: MEDICARE

## 2024-12-30 ENCOUNTER — TELEPHONE (OUTPATIENT)
Dept: FAMILY MEDICINE CLINIC | Facility: CLINIC | Age: 89
End: 2024-12-30

## 2024-12-30 DIAGNOSIS — J45.40 MODERATE PERSISTENT ASTHMA WITHOUT COMPLICATION: Primary | ICD-10-CM

## 2024-12-30 DIAGNOSIS — N18.2 CHRONIC KIDNEY DISEASE, STAGE II (MILD): ICD-10-CM

## 2024-12-30 DIAGNOSIS — M1A.9XX0 CHRONIC GOUT WITHOUT TOPHUS, UNSPECIFIED CAUSE, UNSPECIFIED SITE: ICD-10-CM

## 2024-12-30 DIAGNOSIS — R73.01 IMPAIRED FASTING GLUCOSE: ICD-10-CM

## 2024-12-30 DIAGNOSIS — I10 BENIGN ESSENTIAL HYPERTENSION: ICD-10-CM

## 2024-12-30 DIAGNOSIS — E78.2 MIXED HYPERLIPIDEMIA: ICD-10-CM

## 2024-12-30 LAB
ALBUMIN SERPL BCG-MCNC: 3.9 G/DL (ref 3.5–5)
ALP SERPL-CCNC: 69 U/L (ref 34–104)
ALT SERPL W P-5'-P-CCNC: 23 U/L (ref 7–52)
ANION GAP SERPL CALCULATED.3IONS-SCNC: 9 MMOL/L (ref 4–13)
AST SERPL W P-5'-P-CCNC: 19 U/L (ref 13–39)
BILIRUB SERPL-MCNC: 0.58 MG/DL (ref 0.2–1)
BUN SERPL-MCNC: 21 MG/DL (ref 5–25)
CALCIUM SERPL-MCNC: 9.2 MG/DL (ref 8.4–10.2)
CHLORIDE SERPL-SCNC: 109 MMOL/L (ref 96–108)
CHOLEST SERPL-MCNC: 176 MG/DL (ref ?–200)
CO2 SERPL-SCNC: 23 MMOL/L (ref 21–32)
CREAT SERPL-MCNC: 1.05 MG/DL (ref 0.6–1.3)
EST. AVERAGE GLUCOSE BLD GHB EST-MCNC: 131 MG/DL
GFR SERPL CREATININE-BSD FRML MDRD: 45 ML/MIN/1.73SQ M
GLUCOSE P FAST SERPL-MCNC: 111 MG/DL (ref 65–99)
HBA1C MFR BLD: 6.2 %
HDLC SERPL-MCNC: 53 MG/DL
LDLC SERPL CALC-MCNC: 83 MG/DL (ref 0–100)
NONHDLC SERPL-MCNC: 123 MG/DL
POTASSIUM SERPL-SCNC: 4.1 MMOL/L (ref 3.5–5.3)
PROT SERPL-MCNC: 6.1 G/DL (ref 6.4–8.4)
SODIUM SERPL-SCNC: 141 MMOL/L (ref 135–147)
TRIGL SERPL-MCNC: 200 MG/DL (ref ?–150)
URATE SERPL-MCNC: 5.1 MG/DL (ref 2–7.5)

## 2024-12-30 PROCEDURE — 84550 ASSAY OF BLOOD/URIC ACID: CPT

## 2024-12-30 PROCEDURE — 80053 COMPREHEN METABOLIC PANEL: CPT

## 2024-12-30 PROCEDURE — 36415 COLL VENOUS BLD VENIPUNCTURE: CPT

## 2024-12-30 PROCEDURE — 80061 LIPID PANEL: CPT

## 2024-12-30 PROCEDURE — 83036 HEMOGLOBIN GLYCOSYLATED A1C: CPT

## 2024-12-30 RX ORDER — MOMETASONE FUROATE 220 UG/1
1 INHALANT RESPIRATORY (INHALATION) EVERY EVENING
Qty: 1 EACH | Refills: 11 | Status: SHIPPED | OUTPATIENT
Start: 2024-12-30

## 2024-12-30 NOTE — TELEPHONE ENCOUNTER
Patient is at [facility] right now. They are out of Qvar inhaler that was prescribed. They have asminex inhaler in sock. Please send new rx for asminex inhaler instead.

## 2024-12-31 DIAGNOSIS — I10 BENIGN ESSENTIAL HYPERTENSION: ICD-10-CM

## 2024-12-31 RX ORDER — TELMISARTAN 80 MG/1
80 TABLET ORAL DAILY
Qty: 100 TABLET | Refills: 1 | Status: SHIPPED | OUTPATIENT
Start: 2024-12-31

## 2025-01-02 ENCOUNTER — OFFICE VISIT (OUTPATIENT)
Age: OVER 89
End: 2025-01-02
Payer: MEDICARE

## 2025-01-02 VITALS — WEIGHT: 129 LBS | HEART RATE: 74 BPM | HEIGHT: 59 IN | BODY MASS INDEX: 26 KG/M2 | RESPIRATION RATE: 16 BRPM

## 2025-01-02 DIAGNOSIS — M67.472 GANGLION CYST OF LEFT FOOT: ICD-10-CM

## 2025-01-02 DIAGNOSIS — M54.16 RADICULOPATHY OF LUMBAR REGION: ICD-10-CM

## 2025-01-02 DIAGNOSIS — M21.962 ACQUIRED DEFORMITY OF LEFT FOOT: ICD-10-CM

## 2025-01-02 DIAGNOSIS — M77.41 METATARSALGIA OF BOTH FEET: Primary | ICD-10-CM

## 2025-01-02 DIAGNOSIS — M25.572 ARTHRALGIA OF LEFT FOOT: ICD-10-CM

## 2025-01-02 DIAGNOSIS — M20.12 HALLUX VALGUS OF LEFT FOOT: ICD-10-CM

## 2025-01-02 DIAGNOSIS — M77.42 METATARSALGIA OF BOTH FEET: Primary | ICD-10-CM

## 2025-01-02 PROCEDURE — 99212 OFFICE O/P EST SF 10 MIN: CPT | Performed by: PODIATRIST

## 2025-01-02 NOTE — PROGRESS NOTES
Assessment/Plan: Hallux valgus deformity left foot.  Arthralgia left first MPJ.  History of acute gouty arthritis.  Acquired deformity foot bilateral.  Acquired pes planus.  History of radiculopathy.    Plan.  Chart reviewed.  X-rays reviewed.  PCP notes reviewed.  Patient advised on condition.  At this time she will use Voltaren gel as needed.  In addition present for arthrocentesis as needed.         Diagnoses and all orders for this visit:    Metatarsalgia of both feet    Radiculopathy of lumbar region    Arthralgia of left foot    Ganglion cyst of left foot    Acquired deformity of left foot    Hallux valgus of left foot          Subjective: Patient is doing significantly better.  Last injection therapy helped immensely.    Allergies   Allergen Reactions    Atorvastatin      Other reaction(s): Leg Cramps  Reaction Date: 15Nov2010;     Azithromycin      Other reaction(s): Unknown Allergic Reaction  Reaction Date: 07Jan2004; Annotation - [id number]: jjw    Codeine Nausea Only     Reaction Date: 07Jan2004; Annotation - [id number]: jjw    Moxifloxacin      Other reaction(s): Diarrhea    Paxlovid [Nirmatrelvir-Ritonavir] Diarrhea     Pt stated that she had no appetite when taking medication     Penicillins Rash     Reaction Date: 07Jan2004; Annotation - [id number]: jjw         Current Outpatient Medications:     albuterol (ProAir HFA) 90 mcg/act inhaler, Inhale 2 puffs every 6 (six) hours as needed for wheezing, Disp: 18 g, Rfl: 1    alendronate (Fosamax) 70 mg tablet, Take 1 tablet (70 mg total) by mouth every 7 days, Disp: 12 tablet, Rfl: 3    allopurinol (ZYLOPRIM) 100 mg tablet, Take 2 tablets by mouth once daily, Disp: 200 tablet, Rfl: 1    amLODIPine (NORVASC) 10 mg tablet, Take 1 tablet by mouth once daily, Disp: 100 tablet, Rfl: 1    cycloSPORINE (RESTASIS) 0.05 % ophthalmic emulsion, Administer 1 drop to both eyes 2 (two) times a day, Disp: , Rfl:     levothyroxine 50 mcg tablet, Take 1 tablet by mouth once  daily, Disp: 100 tablet, Rfl: 1    metoprolol succinate (TOPROL-XL) 25 mg 24 hr tablet, TAKE 1 TABLET BY MOUTH ONCE DAILY AT BEDTIME, Disp: 90 tablet, Rfl: 1    mometasone (Asmanex, 30 Metered Doses,) 220 mcg/actuation inhaler, Inhale 1 puff every evening Rinse mouth after use., Disp: 1 each, Rfl: 11    rosuvastatin (CRESTOR) 5 mg tablet, Take 1 tablet by mouth once daily, Disp: 90 tablet, Rfl: 1    spironolactone (ALDACTONE) 25 mg tablet, TAKE 1 TABLET BY MOUTH EVERY OTHER DAY, Disp: 45 tablet, Rfl: 1    telmisartan (MICARDIS) 80 MG tablet, Take 1 tablet (80 mg total) by mouth daily, Disp: 100 tablet, Rfl: 1    fluticasone (FLONASE) 50 mcg/act nasal spray, , Disp: , Rfl:     Current Facility-Administered Medications:     triamcinolone acetonide (Kenalog-10) 10 mg/mL injection 10 mg, 10 mg, Intra-articular, , , 10 mg at 12/04/24 1400    Patient Active Problem List   Diagnosis    Asthma    Benign essential hypertension    Chronic kidney disease, stage II (mild)    Mixed hyperlipidemia    Osteoporosis    Other specified hypothyroidism    Abnormal CT of the abdomen    Abdominal bloating    Delayed emergence from anesthesia    Back pain    Lipoma of back    Heart murmur    Lumbar radiculopathy    Post-traumatic osteoarthritis of first carpometacarpal joint of right hand    Chronic gout without tophus    Impaired fasting glucose          Patient ID: Nicki Escobedo is a 89 y.o. female.    HPI    The following portions of the patient's history were reviewed and updated as appropriate:     family history includes Aneurysm in her sister; Breast cancer (age of onset: 80) in her sister; Cancer in her daughter; Heart disease in her brother, brother, brother, daughter, father, and sister; Kidney disease in her sister; No Known Problems in her maternal aunt, maternal aunt, maternal aunt, paternal aunt, and paternal aunt.      reports that she has never smoked. She has never been exposed to tobacco smoke. She has never used  smokeless tobacco. She reports that she does not currently use alcohol. She reports that she does not use drugs.    Vitals:    01/02/25 1513   Pulse: 74   Resp: 16       Review of Systems      Objective:  Patient's shoes and socks removed.   Foot ExamPhysical Exam        General  General Appearance: appears stated age and healthy   Orientation: alert and oriented to person, place, and time   Affect: appropriate   Gait: antalgic         Right Foot/Ankle      Inspection and Palpation  Ecchymosis: none  Tenderness: bony tenderness .  Pain patient has inflamed bunion.  No evidence of a infection or gout  Swelling: dorsum   Arch: pes cavus  Claw Toes: second toe  Hallux valgus: no  Hallux limitus: yes  Skin Exam: callus and dry skin;      Neurovascular  Dorsalis pedis: 3+  Posterior tibial: 3+  Saphenous nerve sensation: normal  Tibial nerve sensation: normal  Superficial peroneal nerve sensation: normal  Deep peroneal nerve sensation: normal  Sural nerve sensation: normal        Left Foot/Ankle       Inspection and Palpation  Ecchymosis: none  Swelling: dorsum   Arch: pes planus  Hallux valgus: no  Hallux limitus: yes  Skin Exam: callus and dry skin;      Neurovascular  Dorsalis pedis: 3+  Posterior tibial: 3+  Saphenous nerve sensation: normal  Tibial nerve sensation: normal  Superficial peroneal nerve sensation: normal  Deep peroneal nerve sensation: normal  Sural nerve sensation: normal           Physical Exam  Vitals signs and nursing note reviewed.   Constitutional:       Appearance: Normal appearance.   Cardiovascular:      Pulses:           Dorsalis pedis pulses are 3+ on the right side and 3+ on the left side.        Posterior tibial pulses are 3+ on the right side and 3+ on the left side.   Musculoskeletal:      Right foot: Bony tenderness present. No bunion.      Left foot: No bunion.      Comments: X-ray of right foot demonstrates no evidence of fracture osteomyelitis.  However middle phalanx of 2nd right toe  demonstrates cystic change consistent with interosseous tophaceous change.   Feet:      Right foot:      Skin integrity: Callus and dry skin present.      Left foot:      Skin integrity: Callus and dry skin present.   Skin:     General: Skin is warm.      Patient has 4/5 L5 testing.

## 2025-01-08 DIAGNOSIS — I10 ESSENTIAL HYPERTENSION: ICD-10-CM

## 2025-01-09 RX ORDER — METOPROLOL SUCCINATE 25 MG/1
25 TABLET, EXTENDED RELEASE ORAL
Qty: 90 TABLET | Refills: 1 | Status: SHIPPED | OUTPATIENT
Start: 2025-01-09

## 2025-01-13 ENCOUNTER — OFFICE VISIT (OUTPATIENT)
Dept: FAMILY MEDICINE CLINIC | Facility: CLINIC | Age: OVER 89
End: 2025-01-13
Payer: MEDICARE

## 2025-01-13 VITALS
DIASTOLIC BLOOD PRESSURE: 60 MMHG | SYSTOLIC BLOOD PRESSURE: 130 MMHG | WEIGHT: 127 LBS | HEIGHT: 58 IN | TEMPERATURE: 97.4 F | OXYGEN SATURATION: 94 % | HEART RATE: 54 BPM | RESPIRATION RATE: 18 BRPM | BODY MASS INDEX: 26.66 KG/M2

## 2025-01-13 DIAGNOSIS — R73.01 IMPAIRED FASTING GLUCOSE: ICD-10-CM

## 2025-01-13 DIAGNOSIS — J45.40 MODERATE PERSISTENT ASTHMA WITHOUT COMPLICATION: ICD-10-CM

## 2025-01-13 DIAGNOSIS — E78.2 MIXED HYPERLIPIDEMIA: ICD-10-CM

## 2025-01-13 DIAGNOSIS — E03.8 OTHER SPECIFIED HYPOTHYROIDISM: ICD-10-CM

## 2025-01-13 DIAGNOSIS — I10 BENIGN ESSENTIAL HYPERTENSION: Primary | ICD-10-CM

## 2025-01-13 PROBLEM — R14.0 ABDOMINAL BLOATING: Status: RESOLVED | Noted: 2020-11-20 | Resolved: 2025-01-13

## 2025-01-13 PROBLEM — T88.59XA DELAYED EMERGENCE FROM ANESTHESIA: Status: RESOLVED | Noted: 2021-06-10 | Resolved: 2025-01-13

## 2025-01-13 PROBLEM — R93.5 ABNORMAL CT OF THE ABDOMEN: Status: RESOLVED | Noted: 2020-11-20 | Resolved: 2025-01-13

## 2025-01-13 PROCEDURE — 99214 OFFICE O/P EST MOD 30 MIN: CPT | Performed by: FAMILY MEDICINE

## 2025-01-13 RX ORDER — AMLODIPINE BESYLATE 10 MG/1
10 TABLET ORAL DAILY
Qty: 100 TABLET | Refills: 1 | Status: SHIPPED | OUTPATIENT
Start: 2025-01-13

## 2025-01-13 NOTE — PROGRESS NOTES
Name: Nicki Escobedo      : 1931      MRN: [id number]  Encounter Provider: Cesilia Bergeron MD  Encounter Date: 2025   Encounter department: [facility]    Assessment & Plan  Benign essential hypertension  Stable  Cont med  Will stop Spironolactone due to dizziness   Orders:  •  Comprehensive metabolic panel; Future  •  amLODIPine (NORVASC) 10 mg tablet; Take 1 tablet (10 mg total) by mouth daily    Impaired fasting glucose  Stable  Low carbs diet   Orders:  •  Hemoglobin A1C; Future    Other specified hypothyroidism  Stable  Cont med  Orders:  •  TSH, 3rd generation; Future    Mixed hyperlipidemia  Stable  Cont med  Orders:  •  Lipid panel; Future    Moderate persistent asthma without complication  Stable  Cont med            History of Present Illness     Pt is seeing for f/u HTN, HLD, preDM, Asthma -  all stable       Review of Systems   Constitutional: Negative.    HENT: Negative.     Respiratory: Negative.     Cardiovascular: Negative.    Gastrointestinal: Negative.    Genitourinary: Negative.    Musculoskeletal: Negative.    Skin: Negative.    Neurological: Negative.    Psychiatric/Behavioral: Negative.       Past Medical History:   Diagnosis Date   • Anesthesia     groggy, difficult to awaken   • Asthma    • Chronic kidney disease     stage 2 mild   • Chronic pain disorder     lumbar herniated discs   • Dry eyes    • Hyperlipidemia    • Hypertension      Past Surgical History:   Procedure Laterality Date   • APPENDECTOMY     • CATARACT EXTRACTION Bilateral    • COLONOSCOPY N/A 10/25/2017    Procedure: COLONOSCOPY;  Surgeon: Austin Finley MD;  Location: [facility] GI LAB;  Service: Gastroenterology   • EPIDURAL BLOCK INJECTION Right 2022    Procedure: L4 L5 S1  TRANSFORAMINAL epidural steroid injection ([id number]);  Surgeon: Edvin Larry DO;  Location: [facility] MAIN OR;  Service: Pain Management    • EPIDURAL BLOCK INJECTION Right 7/10/2024    Procedure: RIGHT  L4-L5 L5-S1 TRANSFORAMINAL EPIDURAL STEROID INJECTION;  Surgeon: Edvin Larry DO;  Location: [facility] MAIN OR;  Service: Pain Management    • JOINT REPLACEMENT Bilateral     partial knee   • GA EXC B9 LESION MRGN XCP SK TG T/A/L >4.0 CM Left 6/11/2021    Procedure: EXCISION  BIOPSY LESION/MASS BACK, LEFT BACK, LEFT CHEST WALL;  Surgeon: Bernie Pardo MD;  Location: [facility] MAIN OR;  Service: General     Family History   Problem Relation Age of Onset   • Heart disease Father    • Aneurysm Sister    • Heart disease Brother    • Cancer Daughter         kidney    • Heart disease Brother    • Heart disease Brother    • Heart disease Sister         MI   • Kidney disease Sister    • Heart disease Daughter    • No Known Problems Maternal Aunt    • No Known Problems Maternal Aunt    • No Known Problems Maternal Aunt    • No Known Problems Paternal Aunt    • No Known Problems Paternal Aunt    • Breast cancer Sister 80     Social History     Tobacco Use   • Smoking status: Never     Passive exposure: Never   • Smokeless tobacco: Never   Vaping Use   • Vaping status: Never Used   Substance and Sexual Activity   • Alcohol use: Not Currently   • Drug use: No   • Sexual activity: Not on file     Current Outpatient Medications on File Prior to Visit   Medication Sig   • albuterol (ProAir HFA) 90 mcg/act inhaler Inhale 2 puffs every 6 (six) hours as needed for wheezing   • alendronate (Fosamax) 70 mg tablet Take 1 tablet (70 mg total) by mouth every 7 days   • allopurinol (ZYLOPRIM) 100 mg tablet Take 2 tablets by mouth once daily   • cycloSPORINE (RESTASIS) 0.05 % ophthalmic emulsion Administer 1 drop to both eyes 2 (two) times a day   • levothyroxine 50 mcg tablet Take 1 tablet by mouth once daily   • metoprolol succinate (TOPROL-XL) 25 mg 24 hr tablet TAKE 1 TABLET BY MOUTH ONCE DAILY AT BEDTIME   • mometasone (Asmanex, 30 Metered Doses,) 220 mcg/actuation inhaler Inhale 1 puff every evening Rinse mouth after use.   • rosuvastatin  "(CRESTOR) 5 mg tablet Take 1 tablet by mouth once daily   • telmisartan (MICARDIS) 80 MG tablet Take 1 tablet (80 mg total) by mouth daily   • [DISCONTINUED] amLODIPine (NORVASC) 10 mg tablet Take 1 tablet by mouth once daily   • [DISCONTINUED] spironolactone (ALDACTONE) 25 mg tablet TAKE 1 TABLET BY MOUTH EVERY OTHER DAY   • fluticasone (FLONASE) 50 mcg/act nasal spray  (Patient not taking: Reported on 6/10/2024)     Allergies   Allergen Reactions   • Atorvastatin      Other reaction(s): Leg Cramps  Reaction Date: 15Nov2010;    • Azithromycin      Other reaction(s): Unknown Allergic Reaction  Reaction Date: 07Jan2004; Annotation - [id number]: jjw   • Codeine Nausea Only     Reaction Date: 07Jan2004; Annotation - [id number]: jjw   • Moxifloxacin      Other reaction(s): Diarrhea   • Paxlovid [Nirmatrelvir-Ritonavir] Diarrhea     Pt stated that she had no appetite when taking medication    • Penicillins Rash     Reaction Date: 07Jan2004; Annotation - [id number]: jjw     Immunization History   Administered Date(s) Administered   • COVID-19 PFIZER VACCINE 0.3 ML IM 01/23/2021, 02/13/2021, 10/08/2021   • Influenza Split High Dose Preservative Free IM 09/23/2014, 01/04/2016, 10/07/2024   • Influenza, high dose seasonal 0.7 mL 10/26/2020, 11/17/2022, 12/19/2023   • Influenza, injectable, quadrivalent, preservative free 0.5 mL 02/11/2019   • Influenza, recombinant, quadrivalent,injectable, preservative free 10/18/2019, 11/09/2021   • Influenza, seasonal, injectable 12/13/2007, 09/25/2008, 11/13/2012   • Pneumococcal Conjugate 13-Valent 06/05/2017   • Pneumococcal Polysaccharide PPV23 12/13/2007   • Tdap 10/21/2023, 10/21/2023   • influenza, injectable, quadrivalent 10/18/2019, 10/26/2020     Objective   /60 (BP Location: Left arm, Patient Position: Sitting, Cuff Size: Standard)   Pulse (!) 54   Temp (!) 97.4 °F (36.3 °C) (Temporal)   Resp 18   Ht 4' 10\" (1.473 m)   Wt 57.6 kg (127 lb)   SpO2 94%   BMI 26.54 kg/m² "     Physical Exam  Constitutional:       General: She is not in acute distress.     Appearance: She is not ill-appearing, toxic-appearing or diaphoretic.   Cardiovascular:      Rate and Rhythm: Normal rate and regular rhythm.      Heart sounds: Murmur heard.      No gallop.   Pulmonary:      Effort: Pulmonary effort is normal. No respiratory distress.      Breath sounds: No wheezing or rhonchi.   Musculoskeletal:      Right lower leg: No edema.      Left lower leg: No edema.   Neurological:      Mental Status: She is alert and oriented to person, place, and time.      Cranial Nerves: No cranial nerve deficit.      Motor: No weakness.      Gait: Gait normal.   Psychiatric:         Mood and Affect: Mood normal.         Thought Content: Thought content normal.         Judgment: Judgment normal.

## 2025-01-13 NOTE — ASSESSMENT & PLAN NOTE
Stable  Cont med  Will stop Spironolactone due to dizziness   Orders:  •  Comprehensive metabolic panel; Future  •  amLODIPine (NORVASC) 10 mg tablet; Take 1 tablet (10 mg total) by mouth daily

## 2025-01-23 ENCOUNTER — APPOINTMENT (OUTPATIENT)
Dept: RADIOLOGY | Facility: CLINIC | Age: OVER 89
End: 2025-01-23
Payer: MEDICARE

## 2025-01-23 ENCOUNTER — OFFICE VISIT (OUTPATIENT)
Dept: FAMILY MEDICINE CLINIC | Facility: CLINIC | Age: OVER 89
End: 2025-01-23
Payer: MEDICARE

## 2025-01-23 VITALS
BODY MASS INDEX: 26.24 KG/M2 | WEIGHT: 125 LBS | RESPIRATION RATE: 16 BRPM | HEIGHT: 58 IN | SYSTOLIC BLOOD PRESSURE: 110 MMHG | DIASTOLIC BLOOD PRESSURE: 62 MMHG | OXYGEN SATURATION: 94 % | TEMPERATURE: 97.7 F

## 2025-01-23 DIAGNOSIS — J22 LOWER RESPIRATORY TRACT INFECTION: ICD-10-CM

## 2025-01-23 DIAGNOSIS — I10 BENIGN ESSENTIAL HYPERTENSION: ICD-10-CM

## 2025-01-23 DIAGNOSIS — M1A.09X0 CHRONIC GOUT OF MULTIPLE SITES, UNSPECIFIED CAUSE: ICD-10-CM

## 2025-01-23 DIAGNOSIS — J22 LOWER RESPIRATORY TRACT INFECTION: Primary | ICD-10-CM

## 2025-01-23 DIAGNOSIS — J45.40 MODERATE PERSISTENT ASTHMA WITHOUT COMPLICATION: ICD-10-CM

## 2025-01-23 DIAGNOSIS — R05.1 ACUTE COUGH: ICD-10-CM

## 2025-01-23 LAB
SARS-COV-2 AG UPPER RESP QL IA: NEGATIVE
VALID CONTROL: NORMAL

## 2025-01-23 PROCEDURE — G2211 COMPLEX E/M VISIT ADD ON: HCPCS | Performed by: STUDENT IN AN ORGANIZED HEALTH CARE EDUCATION/TRAINING PROGRAM

## 2025-01-23 PROCEDURE — 71046 X-RAY EXAM CHEST 2 VIEWS: CPT

## 2025-01-23 PROCEDURE — 87811 SARS-COV-2 COVID19 W/OPTIC: CPT | Performed by: STUDENT IN AN ORGANIZED HEALTH CARE EDUCATION/TRAINING PROGRAM

## 2025-01-23 PROCEDURE — 99214 OFFICE O/P EST MOD 30 MIN: CPT | Performed by: STUDENT IN AN ORGANIZED HEALTH CARE EDUCATION/TRAINING PROGRAM

## 2025-01-23 RX ORDER — METHYLPREDNISOLONE 4 MG/1
TABLET ORAL
Qty: 21 EACH | Refills: 0 | Status: SHIPPED | OUTPATIENT
Start: 2025-01-23

## 2025-01-23 RX ORDER — ALLOPURINOL 100 MG/1
200 TABLET ORAL DAILY
Qty: 200 TABLET | Refills: 1 | Status: SHIPPED | OUTPATIENT
Start: 2025-01-23

## 2025-01-23 RX ORDER — DOXYCYCLINE HYCLATE 100 MG
100 TABLET ORAL 2 TIMES DAILY
Qty: 14 TABLET | Refills: 0 | Status: SHIPPED | OUTPATIENT
Start: 2025-01-23 | End: 2025-01-30

## 2025-01-23 NOTE — PROGRESS NOTES
Name: Nicki Escobedo      : 1931      MRN: [id number]  Encounter Provider: Dimitris Sanchez DO  Encounter Date: 2025   Encounter department: [facility]  :  Assessment & Plan  Lower respiratory tract infection  Symptoms consistent with upper/lower respiratory tract infection, recommend atypical antibiotic with doxycycline, steroid taper, follow-up x-ray imaging to rule out acute pathology, close follow-up for resolution of symptoms recommended.  Orders:  •  doxycycline hyclate (VIBRA-TABS) 100 mg tablet; Take 1 tablet (100 mg total) by mouth 2 (two) times a day for 7 days  •  XR chest pa and lateral; Future  •  methylPREDNISolone 4 MG tablet therapy pack; Use as directed on package    Acute cough    Orders:  •  POCT Rapid Covid Ag    Chronic gout of multiple sites, unspecified cause    Orders:  •  allopurinol (ZYLOPRIM) 100 mg tablet; Take 2 tablets (200 mg total) by mouth daily    Moderate persistent asthma without complication  Continue inhaler therapies, rescue inhaler as needed       Benign essential hypertension  Blood pressure well-controlled, continue antihypertensive medication             Falls Plan of Care: balance, strength, and gait training instructions were provided.       History of Present Illness   Upper/lower respiratory tract infection symptoms      Review of Systems   Constitutional:  Negative for chills and fever.   HENT:  Positive for congestion. Negative for ear pain and sore throat.    Eyes:  Negative for pain and visual disturbance.   Respiratory:  Positive for cough. Negative for shortness of breath and wheezing.    Cardiovascular:  Negative for chest pain, palpitations and leg swelling.   Gastrointestinal:  Negative for abdominal pain, constipation, diarrhea, nausea and vomiting.   Genitourinary:  Negative for dysuria and hematuria.   Musculoskeletal:  Negative for arthralgias and back pain.   Skin:  Negative for color change and rash.  "  Neurological:  Negative for seizures and syncope.   Psychiatric/Behavioral:  Negative for agitation, behavioral problems and confusion.    All other systems reviewed and are negative.      Objective   /62 (BP Location: Left arm, Patient Position: Sitting, Cuff Size: Standard)   Temp 97.7 °F (36.5 °C) (Temporal)   Resp 16   Ht 4' 10\" (1.473 m)   Wt 56.7 kg (125 lb)   SpO2 94%   BMI 26.13 kg/m²      Physical Exam  Vitals and nursing note reviewed.   Constitutional:       General: She is not in acute distress.     Appearance: She is well-developed.   HENT:      Head: Normocephalic and atraumatic.   Eyes:      General: No scleral icterus.     Conjunctiva/sclera: Conjunctivae normal.   Cardiovascular:      Rate and Rhythm: Normal rate and regular rhythm.      Heart sounds: No murmur heard.  Pulmonary:      Effort: Pulmonary effort is normal. No respiratory distress.      Breath sounds: Rhonchi present. No wheezing or rales.   Abdominal:      General: Bowel sounds are normal. There is no distension.      Palpations: Abdomen is soft.      Tenderness: There is no abdominal tenderness.   Musculoskeletal:         General: No swelling. Normal range of motion.      Cervical back: Neck supple.   Skin:     General: Skin is warm and dry.      Capillary Refill: Capillary refill takes less than 2 seconds.   Neurological:      General: No focal deficit present.      Mental Status: She is alert and oriented to person, place, and time. Mental status is at baseline.   Psychiatric:         Mood and Affect: Mood normal.         Behavior: Behavior normal.         "

## 2025-01-24 ENCOUNTER — RESULTS FOLLOW-UP (OUTPATIENT)
Dept: FAMILY MEDICINE CLINIC | Facility: CLINIC | Age: OVER 89
End: 2025-01-24

## 2025-01-24 DIAGNOSIS — J22 LOWER RESPIRATORY TRACT INFECTION: Primary | ICD-10-CM

## 2025-01-24 RX ORDER — CEFDINIR 300 MG/1
300 CAPSULE ORAL EVERY 12 HOURS SCHEDULED
Qty: 14 CAPSULE | Refills: 0 | Status: SHIPPED | OUTPATIENT
Start: 2025-01-24 | End: 2025-01-31

## 2025-01-28 NOTE — TELEPHONE ENCOUNTER
Pt called in after receiving our call. She states she is feeling better. She is scheduled to come in on Friday 1/31/25 at 1:30 PM. She wanted to make the doctor aware she did not take the cefdinir medication. Stating she is allergic to penicillin and didn't want to break out and have to go to the ER.     Please advise if another medication is called in. Nicki is asking we reach out to her on her home number [phone]    Thank you please advise

## 2025-01-31 ENCOUNTER — OFFICE VISIT (OUTPATIENT)
Dept: FAMILY MEDICINE CLINIC | Facility: CLINIC | Age: OVER 89
End: 2025-01-31
Payer: MEDICARE

## 2025-01-31 VITALS
DIASTOLIC BLOOD PRESSURE: 60 MMHG | TEMPERATURE: 98.6 F | BODY MASS INDEX: 25.82 KG/M2 | RESPIRATION RATE: 16 BRPM | SYSTOLIC BLOOD PRESSURE: 130 MMHG | HEART RATE: 68 BPM | HEIGHT: 58 IN | WEIGHT: 123 LBS | OXYGEN SATURATION: 98 %

## 2025-01-31 DIAGNOSIS — E03.8 OTHER SPECIFIED HYPOTHYROIDISM: ICD-10-CM

## 2025-01-31 DIAGNOSIS — J22 LOWER RESPIRATORY TRACT INFECTION: Primary | ICD-10-CM

## 2025-01-31 DIAGNOSIS — J45.40 MODERATE PERSISTENT ASTHMA WITHOUT COMPLICATION: ICD-10-CM

## 2025-01-31 DIAGNOSIS — I10 BENIGN ESSENTIAL HYPERTENSION: ICD-10-CM

## 2025-01-31 PROCEDURE — 99214 OFFICE O/P EST MOD 30 MIN: CPT | Performed by: STUDENT IN AN ORGANIZED HEALTH CARE EDUCATION/TRAINING PROGRAM

## 2025-01-31 PROCEDURE — G2211 COMPLEX E/M VISIT ADD ON: HCPCS | Performed by: STUDENT IN AN ORGANIZED HEALTH CARE EDUCATION/TRAINING PROGRAM

## 2025-01-31 NOTE — PROGRESS NOTES
Name: Nicki Escobedo      : 1931      MRN: [id number]  Encounter Provider: Dimitris Sanchez DO  Encounter Date: 2025   Encounter department: [facility]  :  Assessment & Plan  Lower respiratory tract infection  Improvement of lower respiratory tract infection, patient has finished doxycycline/steroid, did not take cefdinir due to risk for allergy, patient lungs clear to auscultation bilaterally, vital stable, patient doing well, hold off on additional antibiotic or symptomatic support.  Patient will call office on Monday to make sure that she has fully recovered, chest x-ray imaging in 3-4 weeks recommended.  Continue appropriate hydration/nutrition       Moderate persistent asthma without complication  Continue albuterol inhaler as needed       Benign essential hypertension  Blood pressure well-controlled in office today, continue antihypertensive medication       Other specified hypothyroidism  Continue levothyroxine supplementation              History of Present Illness   Follow-up lower respiratory tract infection.      Review of Systems   Constitutional:  Negative for chills and fever.   HENT:  Negative for ear pain and sore throat.    Eyes:  Negative for pain and visual disturbance.   Respiratory:  Negative for cough and shortness of breath.    Cardiovascular:  Negative for chest pain and palpitations.   Gastrointestinal:  Negative for abdominal pain, constipation, diarrhea, nausea and vomiting.   Genitourinary:  Negative for dysuria and hematuria.   Musculoskeletal:  Negative for arthralgias and back pain.   Skin:  Negative for color change and rash.   Neurological:  Negative for seizures and syncope.   Psychiatric/Behavioral:  Negative for agitation, behavioral problems and confusion.    All other systems reviewed and are negative.      Objective   /60 (BP Location: Left arm, Patient Position: Sitting, Cuff Size: Standard)   Pulse 68   Temp 98.6 °F (37 °C)  "(Temporal)   Resp 16   Ht 4' 10\" (1.473 m)   Wt 55.8 kg (123 lb)   SpO2 98%   BMI 25.71 kg/m²      Physical Exam  Vitals and nursing note reviewed.   Constitutional:       General: She is not in acute distress.     Appearance: She is well-developed.   HENT:      Head: Normocephalic and atraumatic.   Eyes:      Conjunctiva/sclera: Conjunctivae normal.   Cardiovascular:      Rate and Rhythm: Normal rate and regular rhythm.      Pulses: Normal pulses.      Heart sounds: No murmur heard.  Pulmonary:      Effort: Pulmonary effort is normal. No respiratory distress.      Breath sounds: Normal breath sounds.   Abdominal:      General: Bowel sounds are normal.      Palpations: Abdomen is soft.      Tenderness: There is no abdominal tenderness.   Musculoskeletal:         General: No swelling. Normal range of motion.      Cervical back: Neck supple.   Skin:     General: Skin is warm and dry.      Capillary Refill: Capillary refill takes less than 2 seconds.   Neurological:      General: No focal deficit present.      Mental Status: She is alert and oriented to person, place, and time. Mental status is at baseline.   Psychiatric:         Mood and Affect: Mood normal.         Behavior: Behavior normal.         "

## 2025-02-03 ENCOUNTER — TELEPHONE (OUTPATIENT)
Age: OVER 89
End: 2025-02-03

## 2025-02-03 NOTE — TELEPHONE ENCOUNTER
The patient called to report that she feels much better, that her cough is almost gone and that her appetite is back.

## 2025-02-05 DIAGNOSIS — E03.8 SUBCLINICAL HYPOTHYROIDISM: ICD-10-CM

## 2025-02-06 RX ORDER — LEVOTHYROXINE SODIUM 50 UG/1
50 TABLET ORAL DAILY
Qty: 90 TABLET | Refills: 1 | Status: SHIPPED | OUTPATIENT
Start: 2025-02-06

## 2025-02-06 NOTE — TELEPHONE ENCOUNTER
Patient called to request a refill for their levothyroxine advised a refill was requested on 2/5/25 and is pending approval. Patient verbalized understanding and is in agreement.     Patient took last pill today

## 2025-02-14 ENCOUNTER — OFFICE VISIT (OUTPATIENT)
Dept: FAMILY MEDICINE CLINIC | Facility: CLINIC | Age: OVER 89
End: 2025-02-14
Payer: MEDICARE

## 2025-02-14 VITALS
DIASTOLIC BLOOD PRESSURE: 80 MMHG | SYSTOLIC BLOOD PRESSURE: 142 MMHG | BODY MASS INDEX: 25.9 KG/M2 | HEART RATE: 74 BPM | WEIGHT: 123.4 LBS | OXYGEN SATURATION: 95 % | HEIGHT: 58 IN | TEMPERATURE: 97 F | RESPIRATION RATE: 18 BRPM

## 2025-02-14 DIAGNOSIS — R19.7 VOMITING AND DIARRHEA: Primary | ICD-10-CM

## 2025-02-14 DIAGNOSIS — R05.1 ACUTE COUGH: ICD-10-CM

## 2025-02-14 DIAGNOSIS — R11.10 VOMITING AND DIARRHEA: Primary | ICD-10-CM

## 2025-02-14 PROCEDURE — G2211 COMPLEX E/M VISIT ADD ON: HCPCS | Performed by: FAMILY MEDICINE

## 2025-02-14 PROCEDURE — 99213 OFFICE O/P EST LOW 20 MIN: CPT | Performed by: FAMILY MEDICINE

## 2025-02-14 RX ORDER — ONDANSETRON 4 MG/1
4 TABLET, FILM COATED ORAL EVERY 8 HOURS PRN
Qty: 20 TABLET | Refills: 0 | Status: SHIPPED | OUTPATIENT
Start: 2025-02-14

## 2025-02-14 NOTE — PROGRESS NOTES
Name: Nicki Escobedo      : 1931      MRN: [id number]  Encounter Provider: Cesilia Bergeron MD  Encounter Date: 2025   Encounter department: [facility]    Assessment & Plan  Vomiting and diarrhea  Brat diet  Increase fluids  Advance diet as tolerated   Orders:  •  ondansetron (ZOFRAN) 4 mg tablet; Take 1 tablet (4 mg total) by mouth every 8 (eight) hours as needed for nausea or vomiting    Acute cough  Resolving             History of Present Illness     Pt is seeing for several episodes of vomiting and diarrhea after meals started 2 days ago -  tolerating fluids Ok or light meal like oatmeal  -  no fever, has been Dx with early pneumonia 2 wks ago and Tx with Doxy -  overall congestion improved -  some cough os still present     Review of Systems   Constitutional:  Positive for appetite change and fatigue. Negative for activity change and fever.   HENT: Negative.     Respiratory:  Positive for cough. Negative for chest tightness, shortness of breath and wheezing.    Cardiovascular: Negative.    Gastrointestinal:  Positive for diarrhea and vomiting. Negative for abdominal pain and constipation.     Past Medical History:   Diagnosis Date   • Anesthesia     groggy, difficult to awaken   • Asthma    • Chronic kidney disease     stage 2 mild   • Chronic pain disorder     lumbar herniated discs   • Dry eyes    • Hyperlipidemia    • Hypertension      Past Surgical History:   Procedure Laterality Date   • APPENDECTOMY     • CATARACT EXTRACTION Bilateral    • COLONOSCOPY N/A 10/25/2017    Procedure: COLONOSCOPY;  Surgeon: Austin Finley MD;  Location: [facility] GI LAB;  Service: Gastroenterology   • EPIDURAL BLOCK INJECTION Right 2022    Procedure: L4 L5 S1  TRANSFORAMINAL epidural steroid injection ([id number]);  Surgeon: Edvin Larry DO;  Location: [facility] MAIN OR;  Service: Pain Management    • EPIDURAL BLOCK INJECTION Right 7/10/2024    Procedure: RIGHT L4-L5 L5-S1  TRANSFORAMINAL EPIDURAL STEROID INJECTION;  Surgeon: Edvin Larry, DO;  Location: [facility] MAIN OR;  Service: Pain Management    • JOINT REPLACEMENT Bilateral     partial knee   • WV EXC B9 LESION MRGN XCP SK TG T/A/L >4.0 CM Left 6/11/2021    Procedure: EXCISION  BIOPSY LESION/MASS BACK, LEFT BACK, LEFT CHEST WALL;  Surgeon: Bernie Pardo MD;  Location: [facility] MAIN OR;  Service: General     Family History   Problem Relation Age of Onset   • Heart disease Father    • Aneurysm Sister    • Heart disease Brother    • Cancer Daughter         kidney    • Heart disease Brother    • Heart disease Brother    • Heart disease Sister         MI   • Kidney disease Sister    • Heart disease Daughter    • No Known Problems Maternal Aunt    • No Known Problems Maternal Aunt    • No Known Problems Maternal Aunt    • No Known Problems Paternal Aunt    • No Known Problems Paternal Aunt    • Breast cancer Sister 80     Social History     Tobacco Use   • Smoking status: Never     Passive exposure: Never   • Smokeless tobacco: Never   Vaping Use   • Vaping status: Never Used   Substance and Sexual Activity   • Alcohol use: Not Currently   • Drug use: No   • Sexual activity: Not on file     Current Outpatient Medications on File Prior to Visit   Medication Sig   • albuterol (ProAir HFA) 90 mcg/act inhaler Inhale 2 puffs every 6 (six) hours as needed for wheezing   • alendronate (Fosamax) 70 mg tablet Take 1 tablet (70 mg total) by mouth every 7 days   • allopurinol (ZYLOPRIM) 100 mg tablet Take 2 tablets (200 mg total) by mouth daily   • amLODIPine (NORVASC) 10 mg tablet Take 1 tablet (10 mg total) by mouth daily   • cycloSPORINE (RESTASIS) 0.05 % ophthalmic emulsion Administer 1 drop to both eyes 2 (two) times a day   • fluticasone (FLONASE) 50 mcg/act nasal spray    • levothyroxine 50 mcg tablet Take 1 tablet (50 mcg total) by mouth daily   • metoprolol succinate (TOPROL-XL) 25 mg 24 hr tablet TAKE 1 TABLET BY MOUTH ONCE DAILY AT  "BEDTIME   • mometasone (Asmanex, 30 Metered Doses,) 220 mcg/actuation inhaler Inhale 1 puff every evening Rinse mouth after use.   • rosuvastatin (CRESTOR) 5 mg tablet Take 1 tablet by mouth once daily   • telmisartan (MICARDIS) 80 MG tablet Take 1 tablet (80 mg total) by mouth daily     Allergies   Allergen Reactions   • Atorvastatin      Other reaction(s): Leg Cramps  Reaction Date: 15Nov2010;    • Azithromycin      Other reaction(s): Unknown Allergic Reaction  Reaction Date: 07Jan2004; Annotation - [id number]: jjw   • Codeine Nausea Only     Reaction Date: 07Jan2004; Annotation - [id number]: jjw   • Moxifloxacin      Other reaction(s): Diarrhea   • Paxlovid [Nirmatrelvir-Ritonavir] Diarrhea     Pt stated that she had no appetite when taking medication    • Penicillins Rash     Reaction Date: 07Jan2004; Annotation - [id number]: jjw     Immunization History   Administered Date(s) Administered   • COVID-19 PFIZER VACCINE 0.3 ML IM 01/23/2021, 02/13/2021, 10/08/2021   • Influenza Split High Dose Preservative Free IM 09/23/2014, 01/04/2016, 10/07/2024   • Influenza, high dose seasonal 0.7 mL 10/26/2020, 11/17/2022, 12/19/2023   • Influenza, injectable, quadrivalent, preservative free 0.5 mL 02/11/2019   • Influenza, recombinant, quadrivalent,injectable, preservative free 10/18/2019, 11/09/2021   • Influenza, seasonal, injectable 12/13/2007, 09/25/2008, 11/13/2012   • Pneumococcal Conjugate 13-Valent 06/05/2017   • Pneumococcal Polysaccharide PPV23 12/13/2007   • Tdap 10/21/2023, 10/21/2023   • influenza, injectable, quadrivalent 10/18/2019, 10/26/2020     Objective   /80 (BP Location: Left arm, Patient Position: Sitting, Cuff Size: Standard)   Pulse 74   Temp (!) 97 °F (36.1 °C) (Temporal)   Resp 18   Ht 4' 10\" (1.473 m)   Wt 56 kg (123 lb 6.4 oz)   SpO2 95%   BMI 25.79 kg/m²     Physical Exam  Constitutional:       General: She is not in acute distress.     Appearance: She is not ill-appearing. "   Cardiovascular:      Rate and Rhythm: Normal rate and regular rhythm.   Pulmonary:      Effort: Pulmonary effort is normal. No respiratory distress.      Breath sounds: No wheezing, rhonchi or rales.   Abdominal:      Palpations: Abdomen is soft.      Tenderness: There is no abdominal tenderness.   Neurological:      Mental Status: She is alert.

## 2025-02-17 DIAGNOSIS — E78.2 MIXED HYPERLIPIDEMIA: ICD-10-CM

## 2025-02-17 RX ORDER — ROSUVASTATIN CALCIUM 5 MG/1
5 TABLET, COATED ORAL DAILY
Qty: 90 TABLET | Refills: 1 | Status: SHIPPED | OUTPATIENT
Start: 2025-02-17

## 2025-02-17 NOTE — TELEPHONE ENCOUNTER
Reason for call:   [x] Refill   [] Prior Auth  [] Other:     Office:   [x] PCP/Provider -  Cesilia Bergeron MD   [] Specialty/Provider -     Medication:     rosuvastatin (CRESTOR) 5 mg tablet       Dose/Frequency:  Take 1 tablet by mouth once daily     Quantity: 90    Pharmacy: [facility] [id number] [facility] [address]     Does the patient have enough for 3 days?   [] Yes   [x] No - Send as HP to POD

## 2025-03-13 ENCOUNTER — OFFICE VISIT (OUTPATIENT)
Age: OVER 89
End: 2025-03-13
Payer: MEDICARE

## 2025-03-13 VITALS — WEIGHT: 123 LBS | HEART RATE: 74 BPM | RESPIRATION RATE: 18 BRPM | HEIGHT: 58 IN | BODY MASS INDEX: 25.82 KG/M2

## 2025-03-13 DIAGNOSIS — I70.209 PERIPHERAL ARTERIOSCLEROSIS (HCC): Primary | ICD-10-CM

## 2025-03-13 DIAGNOSIS — B35.1 ONYCHOMYCOSIS: ICD-10-CM

## 2025-03-13 DIAGNOSIS — M79.672 PAIN IN BOTH FEET: ICD-10-CM

## 2025-03-13 DIAGNOSIS — M79.671 PAIN IN BOTH FEET: ICD-10-CM

## 2025-03-13 PROCEDURE — RECHECK: Performed by: PODIATRIST

## 2025-03-13 PROCEDURE — 11721 DEBRIDE NAIL 6 OR MORE: CPT | Performed by: PODIATRIST

## 2025-03-13 NOTE — PROGRESS NOTES
Assessment/Plan:  Metatarsalgia secondary to radiculopathy, right greater than left.  Pain upon ambulation.  Mycosis of nail.  Arthralgia.  Peripheral artery disease.      Plan.  Chart reviewed.  Patient advised on condition.   She will follow-up with pain management.  Today all nails debrided without pain or complication.  Nails debrided mechanically with nail nipper.  Return for follow-up as needed for injection.  Aftercare instruction given.            Diagnoses and all orders for this visit:     Metatarsalgia of both feet     Radiculopathy of lumbar region     Hallux valgus of right and left foot     Arthralgia of right and left foot, first MPJ     Onychomycosis     Peripheral artery disease.     Pain upon ambulation            Subjective:  Patient has pain in her feet.  She has pain with ambulation shoe wear.  Right greater than left.  She is aware she has back problems causing foot problems.  She is following up with pain management.  She has stopped taking Cymbalta.  She also gets pain in her toes ambulation and shoe wear.  No history of trauma.  She has been following with pain management               Allergies   Allergen Reactions    Atorvastatin         Other reaction(s): Leg Cramps  Reaction Date: 15Nov2010;     Azithromycin         Other reaction(s): Unknown Allergic Reaction  Reaction Date: 07Jan2004; Annotation - [id number]: jjw    Codeine Nausea Only       Reaction Date: 07Jan2004; Annotation - [id number]: jjw    Moxifloxacin         Other reaction(s): Diarrhea    Penicillins Rash       Reaction Date: 07Jan2004; Annotation - [id number]: jjw            Current Outpatient Medications:     albuterol (PROAIR HFA) 90 mcg/act inhaler, Inhale 2 puffs every 6 (six) hours as needed for wheezing, Disp: 1 Inhaler, Rfl: 2    amLODIPine (NORVASC) 5 mg tablet, Take 1 tablet by mouth twice daily (Patient taking differently: 5 mg daily), Disp: 60 tablet, Rfl: 6    beclomethasone (Qvar RediHaler) 80 MCG/ACT inhaler,  Inhale 2 puffs  in the morning and 2 puffs in the evening. Rinse mouth after use.., Disp: 10.6 g, Rfl: 6    cycloSPORINE (RESTASIS) 0.05 % ophthalmic emulsion, Administer 1 drop to both eyes 2 (two) times a day, Disp: , Rfl:     DULoxetine (CYMBALTA) 30 mg delayed release capsule, Take 1 capsule (30 mg total) by mouth daily, Disp: 30 capsule, Rfl: 0    famotidine (PEPCID) 40 MG tablet, Take 1 tablet by mouth once daily, Disp: 30 tablet, Rfl: 0    levothyroxine 50 mcg tablet, Take 1 tablet by mouth once daily, Disp: 90 tablet, Rfl: 0    metoprolol succinate (TOPROL-XL) 50 mg 24 hr tablet, TAKE 1 TABLET BY MOUTH ONCE DAILY AT BEDTIME, Disp: 90 tablet, Rfl: 1    rosuvastatin (CRESTOR) 5 mg tablet, Take 1 tablet by mouth once daily, Disp: 30 tablet, Rfl: 6    telmisartan-hydrochlorothiazide (MICARDIS HCT) 80-12.5 MG per tablet, Take 1 tablet by mouth once daily, Disp: 90 tablet, Rfl: 0           Patient Active Problem List   Diagnosis    Asthma    Benign essential hypertension    Chronic kidney disease, stage II (mild)    Mixed hyperlipidemia    Osteoporosis    Other specified hypothyroidism    Abnormal CT of the abdomen    Epigastric pain    Abdominal bloating    Delayed emergence from anesthesia    Back pain    Lipoma of back    Heart murmur             Patient ID: Nicki Escobedo is a 89 y.o. female.     HPI     The following portions of the patient's history were reviewed and updated as appropriate:      family history includes Aneurysm in her sister; Breast cancer (age of onset: 80) in her sister; Cancer in her daughter; Heart disease in her brother, brother, brother, daughter, father, and sister; Kidney disease in her sister; No Known Problems in her maternal aunt, maternal aunt, maternal aunt, paternal aunt, and paternal aunt.       reports that she has never smoked. She has never used smokeless tobacco. She reports previous alcohol use. She reports that she does not use drugs.        Objective:  Patient's  shoes and socks removed.   Foot ExamPhysical Exam          General  General Appearance: appears stated age and healthy   Orientation: alert and oriented to person, place, and time   Affect: appropriate   Gait: antalgic         Right Foot/Ankle      Inspection and Palpation  Ecchymosis: none  Tenderness: bony tenderness .  Pain patient has inflamed bunion.  No evidence of a infection or gout  Swelling: dorsum   Arch: pes cavus  Claw Toes: second toe  Hallux valgus: no  Hallux limitus: yes  Skin Exam: callus and dry skin;      Neurovascular  Dorsalis pedis: 3+  Posterior tibial: 3+  Saphenous nerve sensation: normal  Tibial nerve sensation: normal  Superficial peroneal nerve sensation: normal  Deep peroneal nerve sensation: normal  Sural nerve sensation: normal        Left Foot/Ankle       Inspection and Palpation  Ecchymosis: none  Swelling: dorsum   Arch: pes planus  Hallux valgus: no  Hallux limitus: yes  Skin Exam: callus and dry skin;      Neurovascular  Dorsalis pedis: 3+  Posterior tibial: 3+  Saphenous nerve sensation: normal  Tibial nerve sensation: normal  Superficial peroneal nerve sensation: normal  Deep peroneal nerve sensation: normal  Sural nerve sensation: normal           Physical Exam  Vitals signs and nursing note reviewed.   Constitutional:       Appearance: Normal appearance.   Cardiovascular:      Pulses:           Dorsalis pedis pulses are 3+ on the right side and 3+ on the left side.        Posterior tibial pulses are 3+ on the right side and 3+ on the left side.   Musculoskeletal:      Right foot: Bony tenderness present. No bunion.      Left foot: No bunion.      Comments: X-ray of right foot demonstrates no evidence of fracture osteomyelitis.  However middle phalanx of 2nd right toe demonstrates cystic change consistent with interosseous tophaceous change.   Feet:      Right foot:      Skin integrity: Callus and dry skin present.      Left foot:      Skin integrity: Callus and dry skin  present.   Skin:     General: Skin is warm.      Patient has 4/5 L5 testing.

## 2025-04-14 ENCOUNTER — OFFICE VISIT (OUTPATIENT)
Dept: OBGYN CLINIC | Facility: CLINIC | Age: OVER 89
End: 2025-04-14
Payer: MEDICARE

## 2025-04-14 ENCOUNTER — APPOINTMENT (OUTPATIENT)
Dept: RADIOLOGY | Facility: CLINIC | Age: OVER 89
End: 2025-04-14
Attending: ORTHOPAEDIC SURGERY
Payer: MEDICARE

## 2025-04-14 VITALS — BODY MASS INDEX: 25.61 KG/M2 | HEIGHT: 58 IN | WEIGHT: 122 LBS

## 2025-04-14 DIAGNOSIS — M25.511 RIGHT SHOULDER PAIN, UNSPECIFIED CHRONICITY: Primary | ICD-10-CM

## 2025-04-14 DIAGNOSIS — M25.511 RIGHT SHOULDER PAIN, UNSPECIFIED CHRONICITY: ICD-10-CM

## 2025-04-14 DIAGNOSIS — M19.011 PRIMARY OSTEOARTHRITIS OF RIGHT SHOULDER: ICD-10-CM

## 2025-04-14 PROCEDURE — 20610 DRAIN/INJ JOINT/BURSA W/O US: CPT | Performed by: ORTHOPAEDIC SURGERY

## 2025-04-14 PROCEDURE — 99203 OFFICE O/P NEW LOW 30 MIN: CPT | Performed by: ORTHOPAEDIC SURGERY

## 2025-04-14 PROCEDURE — 73030 X-RAY EXAM OF SHOULDER: CPT

## 2025-04-14 RX ADMIN — ROPIVACAINE HYDROCHLORIDE 4 ML: 2 INJECTION, SOLUTION EPIDURAL; INFILTRATION; PERINEURAL at 14:15

## 2025-04-14 RX ADMIN — TRIAMCINOLONE ACETONIDE 40 MG: 40 INJECTION, SUSPENSION INTRA-ARTICULAR; INTRAMUSCULAR at 14:15

## 2025-04-14 NOTE — PROGRESS NOTES
Patient Name: Nicki Escobedo      : 1931       MRN: [id number]   Encounter Provider: Bhaskar Wang MD   Encounter Date: 25  Encounter department: [facility]         Assessment & Plan      Nicki is a pleasant 89-year-old female presents today for initial evaluation of her right shoulder pain with associated osteoarthritis.  Upon examination and discussion she has had repeat cortisone shot injections with Dr. Haynes.  I am amenable to giving her a repeat cortisone shot injection of her right shoulder today as well.  I did provide the injection today which was well-tolerated with no complications, post injection instructions were explained.  I did provide her with home exercise program as well today.  She will follow-up on an as-needed basis for repeat cortisone shot injection.  _____________________________________________________  CHIEF COMPLAINT:  Chief Complaint   Patient presents with   • Right Shoulder - Pain         SUBJECTIVE:  Nicki Escobedo is a 89 y.o. female who presents for initial evaluation of her right shoulder pain.  Patient states she has previously seen Dr. Haynes for repeat cortisone shot injections.  Most recent cortisone shot injection was on 3/14/2024 which provided her approximately a year of relief.  She does also participate in home exercise which will provide mild relief.  She denies any recent injury or trauma.  She denies any distal paresthesias.      Right Shoulder Surgical History:  CSI 3/15/2024 Dr. Haynes    PAST MEDICAL HISTORY:  Past Medical History:   Diagnosis Date   • Anesthesia     groggy, difficult to awaken   • Asthma    • Chronic kidney disease     stage 2 mild   • Chronic pain disorder     lumbar herniated discs   • Dry eyes    • Hyperlipidemia    • Hypertension        PAST SURGICAL HISTORY:  Past Surgical History:   Procedure Laterality Date   • APPENDECTOMY     • CATARACT EXTRACTION Bilateral    •  COLONOSCOPY N/A 10/25/2017    Procedure: COLONOSCOPY;  Surgeon: Austin Finley MD;  Location: [facility] GI LAB;  Service: Gastroenterology   • EPIDURAL BLOCK INJECTION Right 12/14/2022    Procedure: L4 L5 S1  TRANSFORAMINAL epidural steroid injection ([id number]);  Surgeon: Edvin Larry DO;  Location: [facility] MAIN OR;  Service: Pain Management    • EPIDURAL BLOCK INJECTION Right 7/10/2024    Procedure: RIGHT L4-L5 L5-S1 TRANSFORAMINAL EPIDURAL STEROID INJECTION;  Surgeon: Edvin Larry DO;  Location: [facility] MAIN OR;  Service: Pain Management    • JOINT REPLACEMENT Bilateral     partial knee   • CA EXC B9 LESION MRGN XCP SK TG T/A/L >4.0 CM Left 6/11/2021    Procedure: EXCISION  BIOPSY LESION/MASS BACK, LEFT BACK, LEFT CHEST WALL;  Surgeon: Bernie Pardo MD;  Location: [facility] MAIN OR;  Service: General       FAMILY HISTORY:  Family History   Problem Relation Age of Onset   • Heart disease Father    • Aneurysm Sister    • Heart disease Brother    • Cancer Daughter         kidney    • Heart disease Brother    • Heart disease Brother    • Heart disease Sister         MI   • Kidney disease Sister    • Heart disease Daughter    • No Known Problems Maternal Aunt    • No Known Problems Maternal Aunt    • No Known Problems Maternal Aunt    • No Known Problems Paternal Aunt    • No Known Problems Paternal Aunt    • Breast cancer Sister 80       SOCIAL HISTORY:  Social History     Tobacco Use   • Smoking status: Never     Passive exposure: Never   • Smokeless tobacco: Never   Vaping Use   • Vaping status: Never Used   Substance Use Topics   • Alcohol use: Not Currently   • Drug use: No       MEDICATIONS:    Current Outpatient Medications:   •  albuterol (ProAir HFA) 90 mcg/act inhaler, Inhale 2 puffs every 6 (six) hours as needed for wheezing, Disp: 18 g, Rfl: 1  •  alendronate (Fosamax) 70 mg tablet, Take 1 tablet (70 mg total) by mouth every 7 days, Disp: 12 tablet, Rfl: 3  •  allopurinol (ZYLOPRIM) 100 mg tablet, Take 2  tablets (200 mg total) by mouth daily, Disp: 200 tablet, Rfl: 1  •  amLODIPine (NORVASC) 10 mg tablet, Take 1 tablet (10 mg total) by mouth daily, Disp: 100 tablet, Rfl: 1  •  cycloSPORINE (RESTASIS) 0.05 % ophthalmic emulsion, Administer 1 drop to both eyes 2 (two) times a day, Disp: , Rfl:   •  fluticasone (FLONASE) 50 mcg/act nasal spray, , Disp: , Rfl:   •  levothyroxine 50 mcg tablet, Take 1 tablet (50 mcg total) by mouth daily, Disp: 90 tablet, Rfl: 1  •  metoprolol succinate (TOPROL-XL) 25 mg 24 hr tablet, TAKE 1 TABLET BY MOUTH ONCE DAILY AT BEDTIME, Disp: 90 tablet, Rfl: 1  •  mometasone (Asmanex, 30 Metered Doses,) 220 mcg/actuation inhaler, Inhale 1 puff every evening Rinse mouth after use., Disp: 1 each, Rfl: 11  •  ondansetron (ZOFRAN) 4 mg tablet, Take 1 tablet (4 mg total) by mouth every 8 (eight) hours as needed for nausea or vomiting, Disp: 20 tablet, Rfl: 0  •  rosuvastatin (CRESTOR) 5 mg tablet, Take 1 tablet (5 mg total) by mouth daily, Disp: 90 tablet, Rfl: 1  •  telmisartan (MICARDIS) 80 MG tablet, Take 1 tablet (80 mg total) by mouth daily, Disp: 100 tablet, Rfl: 1    ALLERGIES:  Allergies   Allergen Reactions   • Atorvastatin      Other reaction(s): Leg Cramps  Reaction Date: 15Nov2010;    • Azithromycin      Other reaction(s): Unknown Allergic Reaction  Reaction Date: 07Jan2004; Annotation - [id number]: jjw   • Codeine Nausea Only     Reaction Date: 07Jan2004; Annotation - [id number]: jjw   • Moxifloxacin      Other reaction(s): Diarrhea   • Paxlovid [Nirmatrelvir-Ritonavir] Diarrhea     Pt stated that she had no appetite when taking medication    • Penicillins Rash     Reaction Date: 07Jan2004; Annotation - [id number]: jjw       LABS:  HgA1c:   Lab Results   Component Value Date    HGBA1C 6.2 (H) 12/30/2024     BMP:   Lab Results   Component Value Date    GLUCOSE 101 12/10/2015    CALCIUM 9.2 12/30/2024     12/10/2015    K 4.1 12/30/2024    CO2 23 12/30/2024     (H) 12/30/2024     "BUN 21 12/30/2024    CREATININE 1.05 12/30/2024     CBC: No components found for: \"CBC\"    _____________________________________________________  Review of systems: ROS is negative other than that noted in the HPI.  Constitutional: Negative for fatigue and fever.   HENT: Negative for sore throat.    Respiratory: Negative for shortness of breath.    Cardiovascular: Negative for chest pain.   Gastrointestinal: Negative for abdominal pain.   Endocrine: Negative for cold intolerance and heat intolerance.   Genitourinary: Negative for flank pain.   Musculoskeletal: Negative for back pain.   Skin: Negative for rash.   Allergic/Immunologic: Negative for immunocompromised state.   Neurological: Negative for dizziness.   Psychiatric/Behavioral: Negative for agitation.     Shoulder Exam: Right Shoulder    Inspection: No ecchymosis, edema, or deformity. No visualized wounds or skin lesions   Palpation: No TTP  Active Motion:       FF: 160       ER: 90        IR: 40  Strength: 5/5 empty can, 5/5 ER,  5/5 IR   Sensory - SILT in the Radial / Ulnar / Median / Axillary nerve distributions  Motor - AIN / PIN / Radial / Ulnar / Median / Axillary motor nerves in tact  Palpable Radial pulse  Cap refill <2secs in all digits        Physical exam:  General/Constitutional: NAD, well developed, well nourished  HENT: Normocephalic, atraumatic  CV: Intact distal pulses, regular rate  Resp: No respiratory distress or labored breathing  Abdomen: soft, nondistended   Lymphatic: No lymphadenopathy palpated  Neuro: Alert and Oriented x 3, no focal deficits  Psych: Normal mood, normal affect  Skin: Warm, dry, no rashes, no erythema  _____________________________________________________  STUDIES REVIEWED:  X-rays obtained on 4/14/2025 demonstrate moderate osteoarthritis of the glenohumeral joint.  No lytic or blastic lesions, no osseous abnormalities.    PROCEDURES PERFORMED:  - Large joint arthrocentesis: R glenohumeral  [place] " "Protocol:  procedure performed by consultantConsent: Verbal consent obtained.  Risks and benefits: risks, benefits and alternatives were discussed  Consent given by: patient  Time out: Immediately prior to procedure a \"time out\" was called to verify the correct patient, procedure, equipment, support staff and site/side marked as required.  Timeout called at: 4/14/2025 3:00 PM.  Patient understanding: patient states understanding of the procedure being performed  Patient consent: the patient's understanding of the procedure matches consent given  Site marked: the operative site was marked  Patient identity confirmed: verbally with patient  Supporting Documentation  Indications: pain   Procedure Details  Location: shoulder - R glenohumeral  Preparation: Patient was prepped and draped in the usual sterile fashion  Needle size: 22 G  Ultrasound guidance: no  Approach: posterolateral  Medications administered: 4 mL ropivacaine 0.2 %; 40 mg triamcinolone acetonide 40 mg/mL    Patient tolerance: patient tolerated the procedure well with no immediate complications  Dressing:  Sterile dressing applied           Scribe Attestation    I,:  Med Beaver am acting as a scribe while in the presence of the attending physician.:       I,:  Bhaskar Wang MD personally performed the services described in this documentation    as scribed in my presence.:           "

## 2025-04-15 RX ORDER — TRIAMCINOLONE ACETONIDE 40 MG/ML
40 INJECTION, SUSPENSION INTRA-ARTICULAR; INTRAMUSCULAR
Status: COMPLETED | OUTPATIENT
Start: 2025-04-14 | End: 2025-04-14

## 2025-04-15 RX ORDER — ROPIVACAINE HYDROCHLORIDE 2 MG/ML
4 INJECTION, SOLUTION EPIDURAL; INFILTRATION; PERINEURAL
Status: COMPLETED | OUTPATIENT
Start: 2025-04-14 | End: 2025-04-14

## 2025-05-22 DIAGNOSIS — M81.0 AGE RELATED OSTEOPOROSIS, UNSPECIFIED PATHOLOGICAL FRACTURE PRESENCE: ICD-10-CM

## 2025-05-23 RX ORDER — ALENDRONATE SODIUM 70 MG/1
70 TABLET ORAL WEEKLY
Qty: 4 TABLET | Refills: 0 | Status: SHIPPED | OUTPATIENT
Start: 2025-05-23

## 2025-06-12 ENCOUNTER — PROCEDURE VISIT (OUTPATIENT)
Age: OVER 89
End: 2025-06-12
Payer: MEDICARE

## 2025-06-12 VITALS — BODY MASS INDEX: 25.61 KG/M2 | WEIGHT: 122 LBS | RESPIRATION RATE: 16 BRPM | HEIGHT: 58 IN

## 2025-06-12 DIAGNOSIS — I70.209 PERIPHERAL ARTERIOSCLEROSIS (HCC): ICD-10-CM

## 2025-06-12 DIAGNOSIS — M10.9 ACUTE GOUTY ARTHRITIS: Primary | ICD-10-CM

## 2025-06-12 DIAGNOSIS — M79.672 LEFT FOOT PAIN: ICD-10-CM

## 2025-06-12 DIAGNOSIS — M54.16 RADICULOPATHY OF LUMBAR REGION: ICD-10-CM

## 2025-06-12 PROCEDURE — 99213 OFFICE O/P EST LOW 20 MIN: CPT | Performed by: PODIATRIST

## 2025-06-12 RX ORDER — COLCHICINE 0.6 MG/1
0.6 TABLET ORAL DAILY
Qty: 4 TABLET | Refills: 0 | Status: SHIPPED | OUTPATIENT
Start: 2025-06-12 | End: 2025-06-16

## 2025-06-12 NOTE — PROGRESS NOTES
Assessment/Plan:  Metatarsalgia secondary to radiculopathy, right greater than left.  Pain upon ambulation.  Mycosis of nail.  Arthralgia.  Peripheral artery disease.      Plan.  Chart reviewed.  Patient advised on condition.   She will follow-up with pain management.  Patient advised on causality of gout.  In addition we will take a pill in pocket approach and prescribe colchicine            Diagnoses and all orders for this visit:     Metatarsalgia of both feet     Radiculopathy of lumbar region     Hallux valgus of right and left foot     Arthralgia of right and left foot, first MPJ     Onychomycosis     Peripheral artery disease.     Pain upon ambulation            Subjective:  Patient has pain in her feet.  She has pain with ambulation shoe wear.  Right greater than left.  She is aware she has back problems causing foot problems.  She is following up with pain management.  She has stopped taking Cymbalta.  She also gets pain in her toes ambulation and shoe wear.  No history of trauma.  She has been following with pain management.  Patient recently had a gout attack in her left foot.  She presents for evaluation.               Allergies   Allergen Reactions    Atorvastatin         Other reaction(s): Leg Cramps  Reaction Date: 15Nov2010;     Azithromycin         Other reaction(s): Unknown Allergic Reaction  Reaction Date: 07Jan2004; Annotation - [id number]: jjw    Codeine Nausea Only       Reaction Date: 07Jan2004; Annotation - [id number]: jjw    Moxifloxacin         Other reaction(s): Diarrhea    Penicillins Rash       Reaction Date: 07Jan2004; Annotation - [id number]: jjw            Current Outpatient Medications:     albuterol (PROAIR HFA) 90 mcg/act inhaler, Inhale 2 puffs every 6 (six) hours as needed for wheezing, Disp: 1 Inhaler, Rfl: 2    amLODIPine (NORVASC) 5 mg tablet, Take 1 tablet by mouth twice daily (Patient taking differently: 5 mg daily), Disp: 60 tablet, Rfl: 6    beclomethasone (Qvar RediHaler)  80 MCG/ACT inhaler, Inhale 2 puffs  in the morning and 2 puffs in the evening. Rinse mouth after use.., Disp: 10.6 g, Rfl: 6    cycloSPORINE (RESTASIS) 0.05 % ophthalmic emulsion, Administer 1 drop to both eyes 2 (two) times a day, Disp: , Rfl:     DULoxetine (CYMBALTA) 30 mg delayed release capsule, Take 1 capsule (30 mg total) by mouth daily, Disp: 30 capsule, Rfl: 0    famotidine (PEPCID) 40 MG tablet, Take 1 tablet by mouth once daily, Disp: 30 tablet, Rfl: 0    levothyroxine 50 mcg tablet, Take 1 tablet by mouth once daily, Disp: 90 tablet, Rfl: 0    metoprolol succinate (TOPROL-XL) 50 mg 24 hr tablet, TAKE 1 TABLET BY MOUTH ONCE DAILY AT BEDTIME, Disp: 90 tablet, Rfl: 1    rosuvastatin (CRESTOR) 5 mg tablet, Take 1 tablet by mouth once daily, Disp: 30 tablet, Rfl: 6    telmisartan-hydrochlorothiazide (MICARDIS HCT) 80-12.5 MG per tablet, Take 1 tablet by mouth once daily, Disp: 90 tablet, Rfl: 0           Patient Active Problem List   Diagnosis    Asthma    Benign essential hypertension    Chronic kidney disease, stage II (mild)    Mixed hyperlipidemia    Osteoporosis    Other specified hypothyroidism    Abnormal CT of the abdomen    Epigastric pain    Abdominal bloating    Delayed emergence from anesthesia    Back pain    Lipoma of back    Heart murmur             Patient ID: Nicki Escobedo is a 89 y.o. female.     HPI     The following portions of the patient's history were reviewed and updated as appropriate:      family history includes Aneurysm in her sister; Breast cancer (age of onset: 80) in her sister; Cancer in her daughter; Heart disease in her brother, brother, brother, daughter, father, and sister; Kidney disease in her sister; No Known Problems in her maternal aunt, maternal aunt, maternal aunt, paternal aunt, and paternal aunt.       reports that she has never smoked. She has never used smokeless tobacco. She reports previous alcohol use. She reports that she does not use drugs.         Objective:  Patient's shoes and socks removed.   Foot ExamPhysical Exam          General  General Appearance: appears stated age and healthy   Orientation: alert and oriented to person, place, and time   Affect: appropriate   Gait: antalgic         Right Foot/Ankle      Inspection and Palpation  Ecchymosis: none  Tenderness: bony tenderness .  Pain patient has inflamed bunion.  No evidence of a infection or gout  Swelling: dorsum   Arch: pes cavus  Claw Toes: second toe  Hallux valgus: no  Hallux limitus: yes  Skin Exam: callus and dry skin;      Neurovascular  Dorsalis pedis: 3+  Posterior tibial: 3+  Saphenous nerve sensation: normal  Tibial nerve sensation: normal  Superficial peroneal nerve sensation: normal  Deep peroneal nerve sensation: normal  Sural nerve sensation: normal        Left Foot/Ankle       Inspection and Palpation  Ecchymosis: none  Swelling: dorsum   Arch: pes planus  Hallux valgus: no  Hallux limitus: yes  Skin Exam: callus and dry skin;      Neurovascular  Dorsalis pedis: 3+  Posterior tibial: 3+  Saphenous nerve sensation: normal  Tibial nerve sensation: normal  Superficial peroneal nerve sensation: normal  Deep peroneal nerve sensation: normal  Sural nerve sensation: normal           Physical Exam  Vitals signs and nursing note reviewed.   Constitutional:       Appearance: Normal appearance.   Cardiovascular:      Pulses:           Dorsalis pedis pulses are 3+ on the right side and 3+ on the left side.        Posterior tibial pulses are 3+ on the right side and 3+ on the left side.   Musculoskeletal:      Right foot: Bony tenderness present. No bunion.      Left foot: No bunion.      Comments: X-ray of right foot demonstrates no evidence of fracture osteomyelitis.  However middle phalanx of 2nd right toe demonstrates cystic change consistent with interosseous tophaceous change.   Feet:      Right foot:      Skin integrity: Callus and dry skin present.      Left foot:      Skin integrity:  Callus and dry skin present.   Skin:     General: Skin is warm.      Patient has 4/5 L5 testing.

## 2025-06-28 DIAGNOSIS — M81.0 AGE RELATED OSTEOPOROSIS, UNSPECIFIED PATHOLOGICAL FRACTURE PRESENCE: ICD-10-CM

## 2025-06-30 RX ORDER — ALENDRONATE SODIUM 70 MG/1
70 TABLET ORAL WEEKLY
Qty: 12 TABLET | Refills: 3 | Status: SHIPPED | OUTPATIENT
Start: 2025-06-30

## 2025-07-02 NOTE — TELEPHONE ENCOUNTER
Patient following up on refill request. Made aware refill was sent to pharmacy on 6/30. Patient to follow up with pharmacy.

## 2025-07-04 DIAGNOSIS — I10 BENIGN ESSENTIAL HYPERTENSION: ICD-10-CM

## 2025-07-04 DIAGNOSIS — I10 ESSENTIAL HYPERTENSION: ICD-10-CM

## 2025-07-04 RX ORDER — METOPROLOL SUCCINATE 25 MG/1
25 TABLET, EXTENDED RELEASE ORAL
Qty: 90 TABLET | Refills: 0 | Status: SHIPPED | OUTPATIENT
Start: 2025-07-04

## 2025-07-07 RX ORDER — AMLODIPINE BESYLATE 10 MG/1
10 TABLET ORAL DAILY
Qty: 100 TABLET | Refills: 1 | Status: SHIPPED | OUTPATIENT
Start: 2025-07-07

## 2025-07-07 RX ORDER — TELMISARTAN 80 MG/1
80 TABLET ORAL DAILY
Qty: 100 TABLET | Refills: 1 | Status: SHIPPED | OUTPATIENT
Start: 2025-07-07

## 2025-07-08 ENCOUNTER — RA CDI HCC (OUTPATIENT)
Dept: OTHER | Facility: HOSPITAL | Age: OVER 89
End: 2025-07-08

## 2025-07-08 NOTE — TELEPHONE ENCOUNTER
Patient called requesting refill for  amLODIPine (NORVASC) 10 mg tablet. Patient made aware medication was refilled on 07-07-25 for 100 with 1 refills to [facility]  pharmacy. Patient instructed to contact the pharmacy and speak with someone directly to obtain refills of medication. Patient advised to call back for refill if their pharmacy is unable to assist them. Patient verbalized understanding.

## 2025-07-23 ENCOUNTER — OFFICE VISIT (OUTPATIENT)
Dept: FAMILY MEDICINE CLINIC | Facility: CLINIC | Age: OVER 89
End: 2025-07-23
Payer: MEDICARE

## 2025-07-23 VITALS
DIASTOLIC BLOOD PRESSURE: 62 MMHG | RESPIRATION RATE: 16 BRPM | SYSTOLIC BLOOD PRESSURE: 120 MMHG | HEIGHT: 58 IN | HEART RATE: 62 BPM | WEIGHT: 126 LBS | OXYGEN SATURATION: 94 % | TEMPERATURE: 98.7 F | BODY MASS INDEX: 26.45 KG/M2

## 2025-07-23 DIAGNOSIS — I10 BENIGN ESSENTIAL HYPERTENSION: Primary | ICD-10-CM

## 2025-07-23 DIAGNOSIS — R73.01 IMPAIRED FASTING GLUCOSE: ICD-10-CM

## 2025-07-23 DIAGNOSIS — E03.8 OTHER SPECIFIED HYPOTHYROIDISM: ICD-10-CM

## 2025-07-23 DIAGNOSIS — Z12.31 ENCOUNTER FOR SCREENING MAMMOGRAM FOR BREAST CANCER: ICD-10-CM

## 2025-07-23 DIAGNOSIS — R26.89 BALANCE PROBLEM: ICD-10-CM

## 2025-07-23 DIAGNOSIS — J45.40 MODERATE PERSISTENT ASTHMA WITHOUT COMPLICATION: ICD-10-CM

## 2025-07-23 DIAGNOSIS — R06.02 SOB (SHORTNESS OF BREATH) ON EXERTION: ICD-10-CM

## 2025-07-23 DIAGNOSIS — M1A.9XX0 CHRONIC GOUT WITHOUT TOPHUS, UNSPECIFIED CAUSE, UNSPECIFIED SITE: ICD-10-CM

## 2025-07-23 PROCEDURE — G2211 COMPLEX E/M VISIT ADD ON: HCPCS | Performed by: FAMILY MEDICINE

## 2025-07-23 PROCEDURE — 99214 OFFICE O/P EST MOD 30 MIN: CPT | Performed by: FAMILY MEDICINE

## 2025-07-23 RX ORDER — MOMETASONE FUROATE 220 UG/1
1 INHALANT RESPIRATORY (INHALATION) EVERY EVENING
Qty: 1 EACH | Refills: 11 | Status: SHIPPED | OUTPATIENT
Start: 2025-07-23

## 2025-07-23 NOTE — PROGRESS NOTES
Name: Nicki Escobedo      : 1931      MRN: [id number]  Encounter Provider: Cesilia Bergeron MD  Encounter Date: 2025   Encounter department: [facility]    Assessment & Plan  Impaired fasting glucose  stable  Orders:  •  Hemoglobin A1C; Future    Other specified hypothyroidism  stable  Orders:  •  TSH, 3rd generation; Future    Benign essential hypertension  stable  Orders:  •  Comprehensive metabolic panel; Future  •  Lipid panel; Future    Chronic gout without tophus, unspecified cause, unspecified site  stable  Orders:  •  Uric acid; Future    Encounter for screening mammogram for breast cancer         Moderate persistent asthma without complication  stable  Orders:  •  mometasone (Asmanex, 30 Metered Doses,) 220 mcg/actuation inhaler; Inhale 1 puff every evening Rinse mouth after use.    Balance problem    Orders:  •  Ambulatory Referral to Physical Therapy; Future    SOB (shortness of breath) on exertion    Orders:  •  Echo complete w/ contrast if indicated; Future         History of Present Illness     Pt is seeing for f/u Asthma, HTN, Gout, Hypothyroidism, IFG -  all stable -  using rescue inhaler 1- times per month       Review of Systems   Constitutional: Negative.    Respiratory:  Positive for shortness of breath (with exertion). Negative for chest tightness and wheezing.    Cardiovascular: Negative.    Gastrointestinal: Negative.    Genitourinary: Negative.    Musculoskeletal: Negative.    Skin: Negative.    Neurological:  Positive for light-headedness (occasional). Negative for dizziness, syncope, facial asymmetry, speech difficulty, weakness and headaches.        Feeling off balance more often over last few months    Psychiatric/Behavioral: Negative.       Past Medical History[1]  Past Surgical History[1]  Family History[1]  Social History[1]  Medications[1]  Allergies   Allergen Reactions   • Atorvastatin      Other reaction(s): Leg Cramps  Reaction Date:  "15Nov2010;    • Azithromycin      Other reaction(s): Unknown Allergic Reaction  Reaction Date: 07Jan2004; Annotation - [id number]: jjw   • Codeine Nausea Only     Reaction Date: 07Jan2004; Annotation - [id number]: jjw   • Moxifloxacin      Other reaction(s): Diarrhea   • Paxlovid [Nirmatrelvir-Ritonavir] Diarrhea     Pt stated that she had no appetite when taking medication    • Penicillins Rash     Reaction Date: 07Jan2004; Annotation - [id number]: jjw     Immunization History   Administered Date(s) Administered   • COVID-19 PFIZER VACCINE 0.3 ML IM 01/23/2021, 02/13/2021, 10/08/2021   • Influenza Split High Dose Preservative Free IM 09/23/2014, 01/04/2016, 10/07/2024   • Influenza, high dose seasonal 0.7 mL 10/26/2020, 11/17/2022, 12/19/2023   • Influenza, injectable, quadrivalent, preservative free 0.5 mL 02/11/2019   • Influenza, recombinant, quadrivalent,injectable, preservative free 10/18/2019, 11/09/2021   • Influenza, seasonal, injectable 12/13/2007, 09/25/2008, 11/13/2012   • Pneumococcal Conjugate 13-Valent 06/05/2017   • Pneumococcal Polysaccharide PPV23 12/13/2007   • Tdap 10/21/2023, 10/21/2023   • influenza, injectable, quadrivalent 10/18/2019, 10/26/2020     Objective   /62 (BP Location: Left arm, Patient Position: Sitting, Cuff Size: Standard)   Pulse 62   Temp 98.7 °F (37.1 °C) (Temporal)   Resp 16   Ht 4' 10\" (1.473 m)   Wt 57.2 kg (126 lb)   SpO2 94%   BMI 26.33 kg/m²     Physical Exam  Constitutional:       General: She is not in acute distress.     Appearance: She is not ill-appearing.     Cardiovascular:      Rate and Rhythm: Normal rate and regular rhythm.      Heart sounds: Murmur heard.      No gallop.   Pulmonary:      Effort: Pulmonary effort is normal. No respiratory distress.      Breath sounds: No wheezing, rhonchi or rales.     Musculoskeletal:      Right lower leg: No edema.      Left lower leg: No edema.     Neurological:      Mental Status: She is alert and oriented to " person, place, and time.      Cranial Nerves: No cranial nerve deficit.      Motor: No weakness.      Gait: Gait normal.     Psychiatric:         Mood and Affect: Mood normal.         Thought Content: Thought content normal.         Judgment: Judgment normal.                [1]  Past Medical History:  Diagnosis Date   • Anesthesia     groggy, difficult to awaken   • Asthma    • Chronic kidney disease     stage 2 mild   • Chronic pain disorder     lumbar herniated discs   • Dry eyes    • Hyperlipidemia    • Hypertension    [1]  Past Surgical History:  Procedure Laterality Date   • APPENDECTOMY     • CATARACT EXTRACTION Bilateral    • COLONOSCOPY N/A 10/25/2017    Procedure: COLONOSCOPY;  Surgeon: Austin Finley MD;  Location: [facility] GI LAB;  Service: Gastroenterology   • EPIDURAL BLOCK INJECTION Right 12/14/2022    Procedure: L4 L5 S1  TRANSFORAMINAL epidural steroid injection ([id number]);  Surgeon: Edvin Larry DO;  Location: [facility] MAIN OR;  Service: Pain Management    • EPIDURAL BLOCK INJECTION Right 7/10/2024    Procedure: RIGHT L4-L5 L5-S1 TRANSFORAMINAL EPIDURAL STEROID INJECTION;  Surgeon: Edvin Larry DO;  Location: [facility] MAIN OR;  Service: Pain Management    • JOINT REPLACEMENT Bilateral     partial knee   • SD EXC B9 LESION MRGN XCP SK TG T/A/L >4.0 CM Left 6/11/2021    Procedure: EXCISION  BIOPSY LESION/MASS BACK, LEFT BACK, LEFT CHEST WALL;  Surgeon: Bernie Pardo MD;  Location: [facility]IN OR;  Service: General   [1]  Family History  Problem Relation Name Age of Onset   • Heart disease Father     • Aneurysm Sister     • Heart disease Brother     • Cancer Daughter          kidney    • Heart disease Brother     • Heart disease Brother     • Heart disease Sister          MI   • Kidney disease Sister     • Heart disease Daughter     • No Known Problems Maternal Aunt     • No Known Problems Maternal Aunt     • No Known Problems Maternal Aunt     • No Known Problems Paternal Aunt     • No Known  Problems Paternal Aunt     • Breast cancer Sister  80   [1]  Social History  Tobacco Use   • Smoking status: Never     Passive exposure: Never   • Smokeless tobacco: Never   Vaping Use   • Vaping status: Never Used   Substance and Sexual Activity   • Alcohol use: Not Currently   • Drug use: No   [1]  Current Outpatient Medications on File Prior to Visit   Medication Sig   • albuterol (ProAir HFA) 90 mcg/act inhaler Inhale 2 puffs every 6 (six) hours as needed for wheezing   • alendronate (FOSAMAX) 70 mg tablet Take 1 tablet by mouth once a week   • allopurinol (ZYLOPRIM) 100 mg tablet Take 2 tablets (200 mg total) by mouth daily   • amLODIPine (NORVASC) 10 mg tablet Take 1 tablet (10 mg total) by mouth daily   • cycloSPORINE (RESTASIS) 0.05 % ophthalmic emulsion Administer 1 drop to both eyes in the morning and 1 drop in the evening.   • fluticasone (FLONASE) 50 mcg/act nasal spray    • levothyroxine 50 mcg tablet Take 1 tablet (50 mcg total) by mouth daily   • metoprolol succinate (TOPROL-XL) 25 mg 24 hr tablet TAKE 1 TABLET BY MOUTH ONCE DAILY AT BEDTIME   • rosuvastatin (CRESTOR) 5 mg tablet Take 1 tablet (5 mg total) by mouth daily   • telmisartan (MICARDIS) 80 MG tablet Take 1 tablet (80 mg total) by mouth daily   • [DISCONTINUED] mometasone (Asmanex, 30 Metered Doses,) 220 mcg/actuation inhaler Inhale 1 puff every evening Rinse mouth after use.   • colchicine (COLCRYS) 0.6 mg tablet Take 1 tablet (0.6 mg total) by mouth daily for 4 days   • ondansetron (ZOFRAN) 4 mg tablet Take 1 tablet (4 mg total) by mouth every 8 (eight) hours as needed for nausea or vomiting (Patient not taking: Reported on 7/23/2025)

## 2025-07-23 NOTE — ASSESSMENT & PLAN NOTE
stable  Orders:  •  mometasone (Asmanex, 30 Metered Doses,) 220 mcg/actuation inhaler; Inhale 1 puff every evening Rinse mouth after use.

## 2025-07-30 DIAGNOSIS — M1A.09X0 CHRONIC GOUT OF MULTIPLE SITES, UNSPECIFIED CAUSE: ICD-10-CM

## 2025-07-30 RX ORDER — ALLOPURINOL 100 MG/1
200 TABLET ORAL DAILY
Qty: 200 TABLET | Refills: 1 | Status: SHIPPED | OUTPATIENT
Start: 2025-07-30

## 2025-07-31 ENCOUNTER — EVALUATION (OUTPATIENT)
Facility: CLINIC | Age: OVER 89
End: 2025-07-31
Payer: MEDICARE

## 2025-07-31 DIAGNOSIS — R26.89 BALANCE PROBLEM: Primary | ICD-10-CM

## 2025-07-31 DIAGNOSIS — M79.2 NEUROPATHIC PAIN OF FOOT, RIGHT: ICD-10-CM

## 2025-07-31 PROCEDURE — 97162 PT EVAL MOD COMPLEX 30 MIN: CPT

## 2025-07-31 RX ORDER — ALLOPURINOL 100 MG/1
200 TABLET ORAL DAILY
Qty: 200 TABLET | Refills: 0 | OUTPATIENT
Start: 2025-07-31

## 2025-08-04 ENCOUNTER — OFFICE VISIT (OUTPATIENT)
Facility: CLINIC | Age: OVER 89
End: 2025-08-04
Payer: MEDICARE

## 2025-08-04 DIAGNOSIS — R26.89 BALANCE PROBLEM: Primary | ICD-10-CM

## 2025-08-04 DIAGNOSIS — M79.2 NEUROPATHIC PAIN OF FOOT, RIGHT: ICD-10-CM

## 2025-08-04 PROCEDURE — 97112 NEUROMUSCULAR REEDUCATION: CPT

## 2025-08-04 PROCEDURE — 97110 THERAPEUTIC EXERCISES: CPT

## 2025-08-07 ENCOUNTER — OFFICE VISIT (OUTPATIENT)
Facility: CLINIC | Age: OVER 89
End: 2025-08-07
Payer: MEDICARE

## 2025-08-07 DIAGNOSIS — R26.89 BALANCE PROBLEM: Primary | ICD-10-CM

## 2025-08-07 DIAGNOSIS — M79.2 NEUROPATHIC PAIN OF FOOT, RIGHT: ICD-10-CM

## 2025-08-07 PROCEDURE — 97112 NEUROMUSCULAR REEDUCATION: CPT

## 2025-08-08 ENCOUNTER — HOSPITAL ENCOUNTER (OUTPATIENT)
Dept: NON INVASIVE DIAGNOSTICS | Facility: HOSPITAL | Age: OVER 89
Discharge: HOME | End: 2025-08-08
Payer: MEDICARE

## 2025-08-08 VITALS
HEIGHT: 58 IN | BODY MASS INDEX: 26.45 KG/M2 | SYSTOLIC BLOOD PRESSURE: 120 MMHG | WEIGHT: 126 LBS | DIASTOLIC BLOOD PRESSURE: 62 MMHG

## 2025-08-08 DIAGNOSIS — R06.02 SOB (SHORTNESS OF BREATH) ON EXERTION: ICD-10-CM

## 2025-08-08 LAB
AORTIC ROOT: 2.7 CM
AORTIC VALVE MEAN VELOCITY: 12.2 M/S
ASCENDING AORTA: 3 CM
AV AREA BY CONTINUOUS VTI: 1.4 CM2
AV AREA PEAK VELOCITY: 1.5 CM2
AV LVOT MEAN GRADIENT: 2 MMHG
AV LVOT PEAK GRADIENT: 3 MMHG
AV MEAN PRESS GRAD SYS DOP V1V2: 7 MMHG
AV ORIFICE AREA US: 1.44 CM2
AV PEAK GRADIENT: 12 MMHG
AV VELOCITY RATIO: 0.51
AV VMAX SYS DOP: 1.71 M/S
BSA FOR ECHO PROCEDURE: 1.5 M2
DOP CALC AO VTI: 46.55 CM
DOP CALC LVOT AREA: 2.83 CM2
DOP CALC LVOT CARDIAC INDEX: 2.22 L/MIN/M2
DOP CALC LVOT CARDIAC OUTPUT: 3.33 L/MIN
DOP CALC LVOT DIAMETER: 1.9 CM
DOP CALC LVOT PEAK VEL VTI: 23.69 CM
DOP CALC LVOT PEAK VEL: 0.91 M/S
DOP CALC LVOT STROKE INDEX: 43.3 ML/M2
DOP CALC LVOT STROKE VOLUME: 67.13
E WAVE DECELERATION TIME: 200 MS
E/A RATIO: 0.79
FRACTIONAL SHORTENING: 33 (ref 28–44)
INTERVENTRICULAR SEPTUM IN DIASTOLE (PARASTERNAL SHORT AXIS VIEW): 1.1 CM
INTERVENTRICULAR SEPTUM: 1.1 CM (ref 0.6–1.1)
LAAS-AP2: 23.4 CM2
LAAS-AP4: 21.4 CM2
LEFT ATRIUM SIZE: 4.3 CM
LEFT ATRIUM VOLUME (MOD BIPLANE): 66 ML
LEFT ATRIUM VOLUME INDEX (MOD BIPLANE): 44 ML/M2
LEFT INTERNAL DIMENSION IN SYSTOLE: 2.6 CM (ref 2.1–4)
LEFT VENTRICULAR INTERNAL DIMENSION IN DIASTOLE: 3.9 CM (ref 3.5–6)
LEFT VENTRICULAR POSTERIOR WALL IN END DIASTOLE: 1.1 CM
LEFT VENTRICULAR STROKE VOLUME: 40 ML
LV EF US.2D.A4C+ESTIMATED: 61 %
LVSV (TEICH): 40 ML
MV E'TISSUE VEL-LAT: 12 CM/S
MV E'TISSUE VEL-SEP: 7 CM/S
MV PEAK A VEL: 0.85 M/S
MV PEAK E VEL: 67 CM/S
MV STENOSIS PRESSURE HALF TIME: 58 MS
MV VALVE AREA P 1/2 METHOD: 3.79
RIGHT ATRIUM AREA SYSTOLE A4C: 11.6 CM2
RIGHT VENTRICLE ID DIMENSION: 3.5 CM
SL CV LEFT ATRIUM LENGTH A2C: 5.9 CM
SL CV PED ECHO LEFT VENTRICLE DIASTOLIC VOLUME (MOD BIPLANE) 2D: 64 ML
SL CV PED ECHO LEFT VENTRICLE SYSTOLIC VOLUME (MOD BIPLANE) 2D: 24 ML
TR MAX PG: 35 MMHG
TR PEAK VELOCITY: 3 M/S
TRICUSPID ANNULAR PLANE SYSTOLIC EXCURSION: 2.4 CM
TRICUSPID VALVE PEAK REGURGITATION VELOCITY: 2.98 M/S

## 2025-08-08 PROCEDURE — 93306 TTE W/DOPPLER COMPLETE: CPT

## 2025-08-08 PROCEDURE — 93306 TTE W/DOPPLER COMPLETE: CPT | Performed by: INTERNAL MEDICINE

## 2025-08-11 ENCOUNTER — OFFICE VISIT (OUTPATIENT)
Facility: CLINIC | Age: OVER 89
End: 2025-08-11
Payer: MEDICARE

## 2025-08-14 ENCOUNTER — OFFICE VISIT (OUTPATIENT)
Facility: CLINIC | Age: OVER 89
End: 2025-08-14
Payer: MEDICARE

## 2025-08-18 ENCOUNTER — OFFICE VISIT (OUTPATIENT)
Facility: CLINIC | Age: OVER 89
End: 2025-08-18
Payer: MEDICARE

## 2025-08-18 DIAGNOSIS — M79.2 NEUROPATHIC PAIN OF FOOT, RIGHT: ICD-10-CM

## 2025-08-18 DIAGNOSIS — R26.89 BALANCE PROBLEM: Primary | ICD-10-CM

## 2025-08-18 PROCEDURE — 97112 NEUROMUSCULAR REEDUCATION: CPT

## 2025-08-18 PROCEDURE — 97110 THERAPEUTIC EXERCISES: CPT

## 2025-08-20 DIAGNOSIS — E78.2 MIXED HYPERLIPIDEMIA: ICD-10-CM

## 2025-08-21 ENCOUNTER — OFFICE VISIT (OUTPATIENT)
Facility: CLINIC | Age: OVER 89
End: 2025-08-21
Payer: MEDICARE

## 2025-08-21 DIAGNOSIS — R26.89 BALANCE PROBLEM: Primary | ICD-10-CM

## 2025-08-21 DIAGNOSIS — M79.2 NEUROPATHIC PAIN OF FOOT, RIGHT: ICD-10-CM

## 2025-08-21 PROCEDURE — 97112 NEUROMUSCULAR REEDUCATION: CPT

## 2025-08-21 PROCEDURE — 97110 THERAPEUTIC EXERCISES: CPT

## 2025-08-21 RX ORDER — ROSUVASTATIN CALCIUM 5 MG/1
5 TABLET, COATED ORAL DAILY
Qty: 90 TABLET | Refills: 1 | Status: SHIPPED | OUTPATIENT
Start: 2025-08-21

## (undated) DEVICE — SKIN MARKER DUAL TIP WITH RULER CAP, FLEXIBLE RULER AND LABELS: Brand: DEVON

## (undated) DEVICE — SYRINGE 5ML LL

## (undated) DEVICE — IV SET EXT SM BORE CARESITE 8IN

## (undated) DEVICE — GLOVE SRG BIOGEL ECLIPSE 6

## (undated) DEVICE — "MH-438 A/W VLVE F/140 EVIS-140": Brand: AIR/WATER VALVE

## (undated) DEVICE — CHLORAPREP APPLICATOR TINTED 10.5ML ONE-STEP

## (undated) DEVICE — SOLIDIFIER FLUID WASTE CONTROL 1500ML

## (undated) DEVICE — "MAJ-901 WATER CONTAINER SET CV-160/140": Brand: WATER CONTAINER

## (undated) DEVICE — RADIOLOGY STERILE LABELS: Brand: CENTURION

## (undated) DEVICE — DRAPE UTILITY

## (undated) DEVICE — PLASTIC ADHESIVE BANDAGE: Brand: CURITY

## (undated) DEVICE — CHLORAPREP HI-LITE 26ML ORANGE

## (undated) DEVICE — PACK GENERAL LF

## (undated) DEVICE — SYRINGE 10ML LL CONTROL TOP

## (undated) DEVICE — GLOVE EXAM NON-STRL NTRL PLUS LRG PF

## (undated) DEVICE — GAUZE SPONGES,16 PLY: Brand: CURITY

## (undated) DEVICE — DISPOSABLE BIOPSY VALVE MAJ-1555: Brand: SINGLE USE BIOPSY VALVE (STERILE)

## (undated) DEVICE — GLOVE SRG BIOGEL 7.5

## (undated) DEVICE — NEEDLE SPINAL 22G X 3.5 IN PLST HUB

## (undated) DEVICE — FABRIC REINFORCED, SURGICAL GOWN, XL: Brand: CONVERTORS

## (undated) DEVICE — SUT VICRYL 3-0 SH 27 IN J416H

## (undated) DEVICE — SYRINGE 10ML LL

## (undated) DEVICE — TUBING AUX CHANNEL

## (undated) DEVICE — PLUMEPEN PRO 10FT

## (undated) DEVICE — DRAPE SHEET THREE QUARTER

## (undated) DEVICE — SUT MONOCRYL 4-0 PS-2 18 IN Y496G

## (undated) DEVICE — WIPES BABY PAMPERS SENSITIVE 36/PK

## (undated) DEVICE — LABEL MEDICATION STERILE 2 YELLOW LIDOCAINE 2 BLUE MARCAINE 2 ORANGE HEPARIN

## (undated) DEVICE — ADHESIVE SKIN HIGH VISCOSITY EXOFIN 1ML

## (undated) DEVICE — AIRLIFE™  ADULT CUSHION NASAL CANNULA WITH 7 FOOT (2.1 M) CRUSH-RESISTANT OXYGEN TUBING, AND U/CONNECT-IT ADAPTER: Brand: AIRLIFE™

## (undated) DEVICE — GLOVE INDICATOR PI UNDERGLOVE SZ 6.5 BLUE

## (undated) DEVICE — TOWEL SET X-RAY

## (undated) DEVICE — SINGLE-USE BIOPSY FORCEPS: Brand: RADIAL JAW 4

## (undated) DEVICE — NEEDLE 25G X 1 1/2

## (undated) DEVICE — TIBURON SPLIT SHEET: Brand: CONVERTORS

## (undated) DEVICE — 1200CC GUARDIAN II: Brand: GUARDIAN

## (undated) DEVICE — "MH-443 SUCTION VALVE F/EVIS140 EVIS160": Brand: SUCTION VALVE

## (undated) DEVICE — LUBRICANT SURGILUBE TUBE 4 OZ  FLIP TOP

## (undated) DEVICE — TUBING BUBBLE CLEAR 5MM X 100 FT NS

## (undated) DEVICE — BASIC DOUBLE BASIN 2-LF: Brand: MEDLINE INDUSTRIES, INC.

## (undated) DEVICE — SYRINGE 3ML LL

## (undated) DEVICE — MEDI-VAC YANKAUER SUCTION HANDLE: Brand: CARDINAL HEALTH

## (undated) DEVICE — BRUSH ENDO CLEANING DBL-HEADER

## (undated) DEVICE — TRAY PAIN SUPPORT